# Patient Record
Sex: MALE | Race: WHITE | NOT HISPANIC OR LATINO | Employment: OTHER | URBAN - METROPOLITAN AREA
[De-identification: names, ages, dates, MRNs, and addresses within clinical notes are randomized per-mention and may not be internally consistent; named-entity substitution may affect disease eponyms.]

---

## 2020-09-01 ENCOUNTER — TELEPHONE (OUTPATIENT)
Dept: NEUROLOGY | Facility: HOSPITAL | Age: 80
End: 2020-09-01

## 2020-09-01 NOTE — TELEPHONE ENCOUNTER
----- Message from Dania Avelar sent at 9/1/2020 10:05 AM CDT -----  Contact: [phone]  GI---Pt  Magedio Padua in regards to scheduling an Colonoscopy.  The haven't seen Dr. Borrero since 2016

## 2020-09-02 ENCOUNTER — TELEPHONE (OUTPATIENT)
Dept: NEUROLOGY | Facility: HOSPITAL | Age: 80
End: 2020-09-02

## 2020-09-02 NOTE — TELEPHONE ENCOUNTER
----- Message from Rand Daly sent at 9/2/2020 10:19 AM CDT -----  Contact: [phone]  GI - Patient is calling to schedule a colonoscopy. Please call

## 2021-07-07 ENCOUNTER — HOSPITAL ENCOUNTER (OUTPATIENT)
Dept: RADIOLOGY | Facility: HOSPITAL | Age: 81
Discharge: HOME | End: 2021-07-07
Attending: INTERNAL MEDICINE
Payer: MEDICARE

## 2021-07-07 DIAGNOSIS — M25.531 RIGHT WRIST PAIN: Primary | ICD-10-CM

## 2021-07-07 DIAGNOSIS — M25.531 RIGHT WRIST PAIN: ICD-10-CM

## 2021-07-07 PROCEDURE — 73100 XR WRIST 2 VIEW RIGHT: ICD-10-PCS | Mod: 26,RT,, | Performed by: RADIOLOGY

## 2021-07-07 PROCEDURE — 73100 X-RAY EXAM OF WRIST: CPT | Mod: TC,FY,RT

## 2021-07-07 PROCEDURE — 73100 X-RAY EXAM OF WRIST: CPT | Mod: 26,RT,, | Performed by: RADIOLOGY

## 2021-07-19 ENCOUNTER — TELEPHONE (OUTPATIENT)
Dept: UROLOGY | Facility: CLINIC | Age: 81
End: 2021-07-19

## 2021-07-26 ENCOUNTER — TELEPHONE (OUTPATIENT)
Dept: UROLOGY | Facility: CLINIC | Age: 81
End: 2021-07-26

## 2021-08-03 ENCOUNTER — OFFICE VISIT (OUTPATIENT)
Dept: UROLOGY | Facility: CLINIC | Age: 81
End: 2021-08-03
Payer: MEDICARE

## 2021-08-03 VITALS
WEIGHT: 158.19 LBS | HEART RATE: 74 BPM | HEIGHT: 70 IN | DIASTOLIC BLOOD PRESSURE: 72 MMHG | SYSTOLIC BLOOD PRESSURE: 128 MMHG | BODY MASS INDEX: 22.65 KG/M2

## 2021-08-03 DIAGNOSIS — N40.1 BPH WITH OBSTRUCTION/LOWER URINARY TRACT SYMPTOMS: ICD-10-CM

## 2021-08-03 DIAGNOSIS — N13.8 BPH WITH OBSTRUCTION/LOWER URINARY TRACT SYMPTOMS: ICD-10-CM

## 2021-08-03 DIAGNOSIS — R33.9 URINARY RETENTION: Primary | ICD-10-CM

## 2021-08-03 PROCEDURE — 1101F PT FALLS ASSESS-DOCD LE1/YR: CPT | Mod: CPTII,S$GLB,, | Performed by: NURSE PRACTITIONER

## 2021-08-03 PROCEDURE — 1160F RVW MEDS BY RX/DR IN RCRD: CPT | Mod: CPTII,S$GLB,, | Performed by: NURSE PRACTITIONER

## 2021-08-03 PROCEDURE — 1101F PR PT FALLS ASSESS DOC 0-1 FALLS W/OUT INJ PAST YR: ICD-10-PCS | Mod: CPTII,S$GLB,, | Performed by: NURSE PRACTITIONER

## 2021-08-03 PROCEDURE — 99999 PR PBB SHADOW E&M-EST. PATIENT-LVL III: CPT | Mod: PBBFAC,,, | Performed by: NURSE PRACTITIONER

## 2021-08-03 PROCEDURE — 99999 PR PBB SHADOW E&M-EST. PATIENT-LVL III: ICD-10-PCS | Mod: PBBFAC,,, | Performed by: NURSE PRACTITIONER

## 2021-08-03 PROCEDURE — 1126F AMNT PAIN NOTED NONE PRSNT: CPT | Mod: CPTII,S$GLB,, | Performed by: NURSE PRACTITIONER

## 2021-08-03 PROCEDURE — 99203 OFFICE O/P NEW LOW 30 MIN: CPT | Mod: S$GLB,,, | Performed by: NURSE PRACTITIONER

## 2021-08-03 PROCEDURE — 1126F PR PAIN SEVERITY QUANTIFIED, NO PAIN PRESENT: ICD-10-PCS | Mod: CPTII,S$GLB,, | Performed by: NURSE PRACTITIONER

## 2021-08-03 PROCEDURE — 3074F PR MOST RECENT SYSTOLIC BLOOD PRESSURE < 130 MM HG: ICD-10-PCS | Mod: CPTII,S$GLB,, | Performed by: NURSE PRACTITIONER

## 2021-08-03 PROCEDURE — 99203 PR OFFICE/OUTPT VISIT, NEW, LEVL III, 30-44 MIN: ICD-10-PCS | Mod: S$GLB,,, | Performed by: NURSE PRACTITIONER

## 2021-08-03 PROCEDURE — 1159F PR MEDICATION LIST DOCUMENTED IN MEDICAL RECORD: ICD-10-PCS | Mod: CPTII,S$GLB,, | Performed by: NURSE PRACTITIONER

## 2021-08-03 PROCEDURE — 3078F DIAST BP <80 MM HG: CPT | Mod: CPTII,S$GLB,, | Performed by: NURSE PRACTITIONER

## 2021-08-03 PROCEDURE — 1159F MED LIST DOCD IN RCRD: CPT | Mod: CPTII,S$GLB,, | Performed by: NURSE PRACTITIONER

## 2021-08-03 PROCEDURE — 3288F FALL RISK ASSESSMENT DOCD: CPT | Mod: CPTII,S$GLB,, | Performed by: NURSE PRACTITIONER

## 2021-08-03 PROCEDURE — 3074F SYST BP LT 130 MM HG: CPT | Mod: CPTII,S$GLB,, | Performed by: NURSE PRACTITIONER

## 2021-08-03 PROCEDURE — 3288F PR FALLS RISK ASSESSMENT DOCUMENTED: ICD-10-PCS | Mod: CPTII,S$GLB,, | Performed by: NURSE PRACTITIONER

## 2021-08-03 PROCEDURE — 1160F PR REVIEW ALL MEDS BY PRESCRIBER/CLIN PHARMACIST DOCUMENTED: ICD-10-PCS | Mod: CPTII,S$GLB,, | Performed by: NURSE PRACTITIONER

## 2021-08-03 PROCEDURE — 3078F PR MOST RECENT DIASTOLIC BLOOD PRESSURE < 80 MM HG: ICD-10-PCS | Mod: CPTII,S$GLB,, | Performed by: NURSE PRACTITIONER

## 2021-08-03 RX ORDER — TAMSULOSIN HYDROCHLORIDE 0.4 MG/1
1 CAPSULE ORAL DAILY
COMMUNITY
Start: 2021-07-14

## 2021-08-03 RX ORDER — MUPIROCIN 20 MG/G
OINTMENT TOPICAL
COMMUNITY
Start: 2021-07-29

## 2021-08-03 RX ORDER — BRIMONIDINE TARTRATE, TIMOLOL MALEATE 2; 5 MG/ML; MG/ML
SOLUTION/ DROPS OPHTHALMIC
COMMUNITY
Start: 2021-04-05

## 2021-08-03 RX ORDER — SULFAMETHOXAZOLE AND TRIMETHOPRIM 800; 160 MG/1; MG/1
1 TABLET ORAL 2 TIMES DAILY
COMMUNITY
Start: 2021-07-29 | End: 2021-08-26 | Stop reason: ALTCHOICE

## 2021-08-03 RX ORDER — FINASTERIDE 5 MG/1
1 TABLET, FILM COATED ORAL DAILY
COMMUNITY
Start: 2021-07-14

## 2021-08-26 ENCOUNTER — PROCEDURE VISIT (OUTPATIENT)
Dept: UROLOGY | Facility: CLINIC | Age: 81
End: 2021-08-26
Payer: MEDICARE

## 2021-08-26 DIAGNOSIS — R33.9 URINARY RETENTION: ICD-10-CM

## 2021-08-26 DIAGNOSIS — N40.1 BPH WITH OBSTRUCTION/LOWER URINARY TRACT SYMPTOMS: ICD-10-CM

## 2021-08-26 DIAGNOSIS — N13.8 BPH WITH OBSTRUCTION/LOWER URINARY TRACT SYMPTOMS: ICD-10-CM

## 2021-08-26 PROCEDURE — 52000 CYSTOURETHROSCOPY: CPT | Mod: S$GLB,,, | Performed by: STUDENT IN AN ORGANIZED HEALTH CARE EDUCATION/TRAINING PROGRAM

## 2021-08-26 PROCEDURE — 52000 PR CYSTOURETHROSCOPY: ICD-10-PCS | Mod: S$GLB,,, | Performed by: STUDENT IN AN ORGANIZED HEALTH CARE EDUCATION/TRAINING PROGRAM

## 2021-09-13 ENCOUNTER — TELEPHONE (OUTPATIENT)
Dept: UROLOGY | Facility: CLINIC | Age: 81
End: 2021-09-13

## 2021-09-24 ENCOUNTER — TELEPHONE (OUTPATIENT)
Dept: UROLOGY | Facility: CLINIC | Age: 81
End: 2021-09-24

## 2021-09-24 ENCOUNTER — OFFICE VISIT (OUTPATIENT)
Dept: UROLOGY | Facility: CLINIC | Age: 81
End: 2021-09-24
Payer: MEDICARE

## 2021-09-24 VITALS
BODY MASS INDEX: 22.88 KG/M2 | HEART RATE: 56 BPM | HEIGHT: 70 IN | DIASTOLIC BLOOD PRESSURE: 76 MMHG | SYSTOLIC BLOOD PRESSURE: 177 MMHG | WEIGHT: 159.81 LBS

## 2021-09-24 DIAGNOSIS — N40.1 BPH WITH OBSTRUCTION/LOWER URINARY TRACT SYMPTOMS: ICD-10-CM

## 2021-09-24 DIAGNOSIS — N13.8 BPH WITH OBSTRUCTION/LOWER URINARY TRACT SYMPTOMS: ICD-10-CM

## 2021-09-24 DIAGNOSIS — R33.9 URINARY RETENTION: Primary | ICD-10-CM

## 2021-09-24 PROCEDURE — 1160F PR REVIEW ALL MEDS BY PRESCRIBER/CLIN PHARMACIST DOCUMENTED: ICD-10-PCS | Mod: CPTII,S$GLB,, | Performed by: STUDENT IN AN ORGANIZED HEALTH CARE EDUCATION/TRAINING PROGRAM

## 2021-09-24 PROCEDURE — 99999 PR PBB SHADOW E&M-EST. PATIENT-LVL III: CPT | Mod: PBBFAC,,, | Performed by: STUDENT IN AN ORGANIZED HEALTH CARE EDUCATION/TRAINING PROGRAM

## 2021-09-24 PROCEDURE — 3077F PR MOST RECENT SYSTOLIC BLOOD PRESSURE >= 140 MM HG: ICD-10-PCS | Mod: CPTII,S$GLB,, | Performed by: STUDENT IN AN ORGANIZED HEALTH CARE EDUCATION/TRAINING PROGRAM

## 2021-09-24 PROCEDURE — 99214 PR OFFICE/OUTPT VISIT, EST, LEVL IV, 30-39 MIN: ICD-10-PCS | Mod: 25,S$GLB,, | Performed by: STUDENT IN AN ORGANIZED HEALTH CARE EDUCATION/TRAINING PROGRAM

## 2021-09-24 PROCEDURE — 3077F SYST BP >= 140 MM HG: CPT | Mod: CPTII,S$GLB,, | Performed by: STUDENT IN AN ORGANIZED HEALTH CARE EDUCATION/TRAINING PROGRAM

## 2021-09-24 PROCEDURE — 1160F RVW MEDS BY RX/DR IN RCRD: CPT | Mod: CPTII,S$GLB,, | Performed by: STUDENT IN AN ORGANIZED HEALTH CARE EDUCATION/TRAINING PROGRAM

## 2021-09-24 PROCEDURE — 1159F PR MEDICATION LIST DOCUMENTED IN MEDICAL RECORD: ICD-10-PCS | Mod: CPTII,S$GLB,, | Performed by: STUDENT IN AN ORGANIZED HEALTH CARE EDUCATION/TRAINING PROGRAM

## 2021-09-24 PROCEDURE — 1101F PT FALLS ASSESS-DOCD LE1/YR: CPT | Mod: CPTII,S$GLB,, | Performed by: STUDENT IN AN ORGANIZED HEALTH CARE EDUCATION/TRAINING PROGRAM

## 2021-09-24 PROCEDURE — 3078F PR MOST RECENT DIASTOLIC BLOOD PRESSURE < 80 MM HG: ICD-10-PCS | Mod: CPTII,S$GLB,, | Performed by: STUDENT IN AN ORGANIZED HEALTH CARE EDUCATION/TRAINING PROGRAM

## 2021-09-24 PROCEDURE — 99999 PR PBB SHADOW E&M-EST. PATIENT-LVL III: ICD-10-PCS | Mod: PBBFAC,,, | Performed by: STUDENT IN AN ORGANIZED HEALTH CARE EDUCATION/TRAINING PROGRAM

## 2021-09-24 PROCEDURE — 1159F MED LIST DOCD IN RCRD: CPT | Mod: CPTII,S$GLB,, | Performed by: STUDENT IN AN ORGANIZED HEALTH CARE EDUCATION/TRAINING PROGRAM

## 2021-09-24 PROCEDURE — 51702 PR INSERTION OF TEMPORARY INDWELLING BLADDER CATHETER, SIMPLE: ICD-10-PCS | Mod: S$GLB,,, | Performed by: STUDENT IN AN ORGANIZED HEALTH CARE EDUCATION/TRAINING PROGRAM

## 2021-09-24 PROCEDURE — 99214 OFFICE O/P EST MOD 30 MIN: CPT | Mod: 25,S$GLB,, | Performed by: STUDENT IN AN ORGANIZED HEALTH CARE EDUCATION/TRAINING PROGRAM

## 2021-09-24 PROCEDURE — 3078F DIAST BP <80 MM HG: CPT | Mod: CPTII,S$GLB,, | Performed by: STUDENT IN AN ORGANIZED HEALTH CARE EDUCATION/TRAINING PROGRAM

## 2021-09-24 PROCEDURE — 1126F AMNT PAIN NOTED NONE PRSNT: CPT | Mod: CPTII,S$GLB,, | Performed by: STUDENT IN AN ORGANIZED HEALTH CARE EDUCATION/TRAINING PROGRAM

## 2021-09-24 PROCEDURE — 1101F PR PT FALLS ASSESS DOC 0-1 FALLS W/OUT INJ PAST YR: ICD-10-PCS | Mod: CPTII,S$GLB,, | Performed by: STUDENT IN AN ORGANIZED HEALTH CARE EDUCATION/TRAINING PROGRAM

## 2021-09-24 PROCEDURE — 3288F PR FALLS RISK ASSESSMENT DOCUMENTED: ICD-10-PCS | Mod: CPTII,S$GLB,, | Performed by: STUDENT IN AN ORGANIZED HEALTH CARE EDUCATION/TRAINING PROGRAM

## 2021-09-24 PROCEDURE — 1126F PR PAIN SEVERITY QUANTIFIED, NO PAIN PRESENT: ICD-10-PCS | Mod: CPTII,S$GLB,, | Performed by: STUDENT IN AN ORGANIZED HEALTH CARE EDUCATION/TRAINING PROGRAM

## 2021-09-24 PROCEDURE — 3288F FALL RISK ASSESSMENT DOCD: CPT | Mod: CPTII,S$GLB,, | Performed by: STUDENT IN AN ORGANIZED HEALTH CARE EDUCATION/TRAINING PROGRAM

## 2021-09-24 PROCEDURE — 51702 INSERT TEMP BLADDER CATH: CPT | Mod: S$GLB,,, | Performed by: STUDENT IN AN ORGANIZED HEALTH CARE EDUCATION/TRAINING PROGRAM

## 2021-09-30 ENCOUNTER — TELEPHONE (OUTPATIENT)
Dept: UROLOGY | Facility: CLINIC | Age: 81
End: 2021-09-30

## 2021-09-30 ENCOUNTER — OFFICE VISIT (OUTPATIENT)
Dept: UROLOGY | Facility: CLINIC | Age: 81
End: 2021-09-30
Payer: MEDICARE

## 2021-09-30 VITALS — HEART RATE: 88 BPM | DIASTOLIC BLOOD PRESSURE: 77 MMHG | SYSTOLIC BLOOD PRESSURE: 149 MMHG

## 2021-09-30 DIAGNOSIS — T83.011A MALFUNCTION OF FOLEY CATHETER, INITIAL ENCOUNTER: ICD-10-CM

## 2021-09-30 DIAGNOSIS — R30.0 DYSURIA: Primary | ICD-10-CM

## 2021-09-30 PROCEDURE — 1160F PR REVIEW ALL MEDS BY PRESCRIBER/CLIN PHARMACIST DOCUMENTED: ICD-10-PCS | Mod: CPTII,S$GLB,, | Performed by: STUDENT IN AN ORGANIZED HEALTH CARE EDUCATION/TRAINING PROGRAM

## 2021-09-30 PROCEDURE — 3078F PR MOST RECENT DIASTOLIC BLOOD PRESSURE < 80 MM HG: ICD-10-PCS | Mod: CPTII,S$GLB,, | Performed by: STUDENT IN AN ORGANIZED HEALTH CARE EDUCATION/TRAINING PROGRAM

## 2021-09-30 PROCEDURE — 99214 PR OFFICE/OUTPT VISIT, EST, LEVL IV, 30-39 MIN: ICD-10-PCS | Mod: S$GLB,,, | Performed by: STUDENT IN AN ORGANIZED HEALTH CARE EDUCATION/TRAINING PROGRAM

## 2021-09-30 PROCEDURE — 3077F SYST BP >= 140 MM HG: CPT | Mod: CPTII,S$GLB,, | Performed by: STUDENT IN AN ORGANIZED HEALTH CARE EDUCATION/TRAINING PROGRAM

## 2021-09-30 PROCEDURE — 99999 PR PBB SHADOW E&M-EST. PATIENT-LVL III: CPT | Mod: PBBFAC,,, | Performed by: STUDENT IN AN ORGANIZED HEALTH CARE EDUCATION/TRAINING PROGRAM

## 2021-09-30 PROCEDURE — 1125F PR PAIN SEVERITY QUANTIFIED, PAIN PRESENT: ICD-10-PCS | Mod: CPTII,S$GLB,, | Performed by: STUDENT IN AN ORGANIZED HEALTH CARE EDUCATION/TRAINING PROGRAM

## 2021-09-30 PROCEDURE — 1125F AMNT PAIN NOTED PAIN PRSNT: CPT | Mod: CPTII,S$GLB,, | Performed by: STUDENT IN AN ORGANIZED HEALTH CARE EDUCATION/TRAINING PROGRAM

## 2021-09-30 PROCEDURE — 1159F PR MEDICATION LIST DOCUMENTED IN MEDICAL RECORD: ICD-10-PCS | Mod: CPTII,S$GLB,, | Performed by: STUDENT IN AN ORGANIZED HEALTH CARE EDUCATION/TRAINING PROGRAM

## 2021-09-30 PROCEDURE — 1159F MED LIST DOCD IN RCRD: CPT | Mod: CPTII,S$GLB,, | Performed by: STUDENT IN AN ORGANIZED HEALTH CARE EDUCATION/TRAINING PROGRAM

## 2021-09-30 PROCEDURE — 99214 OFFICE O/P EST MOD 30 MIN: CPT | Mod: S$GLB,,, | Performed by: STUDENT IN AN ORGANIZED HEALTH CARE EDUCATION/TRAINING PROGRAM

## 2021-09-30 PROCEDURE — 3078F DIAST BP <80 MM HG: CPT | Mod: CPTII,S$GLB,, | Performed by: STUDENT IN AN ORGANIZED HEALTH CARE EDUCATION/TRAINING PROGRAM

## 2021-09-30 PROCEDURE — 99999 PR PBB SHADOW E&M-EST. PATIENT-LVL III: ICD-10-PCS | Mod: PBBFAC,,, | Performed by: STUDENT IN AN ORGANIZED HEALTH CARE EDUCATION/TRAINING PROGRAM

## 2021-09-30 PROCEDURE — 1160F RVW MEDS BY RX/DR IN RCRD: CPT | Mod: CPTII,S$GLB,, | Performed by: STUDENT IN AN ORGANIZED HEALTH CARE EDUCATION/TRAINING PROGRAM

## 2021-09-30 PROCEDURE — 3077F PR MOST RECENT SYSTOLIC BLOOD PRESSURE >= 140 MM HG: ICD-10-PCS | Mod: CPTII,S$GLB,, | Performed by: STUDENT IN AN ORGANIZED HEALTH CARE EDUCATION/TRAINING PROGRAM

## 2021-09-30 RX ORDER — SULFAMETHOXAZOLE AND TRIMETHOPRIM 800; 160 MG/1; MG/1
1 TABLET ORAL 2 TIMES DAILY
Qty: 20 TABLET | Refills: 0 | Status: SHIPPED | OUTPATIENT
Start: 2021-09-30 | End: 2021-10-10

## 2021-10-15 ENCOUNTER — TELEPHONE (OUTPATIENT)
Dept: UROLOGY | Facility: CLINIC | Age: 81
End: 2021-10-15

## 2021-10-18 ENCOUNTER — TELEPHONE (OUTPATIENT)
Dept: UROLOGY | Facility: CLINIC | Age: 81
End: 2021-10-18

## 2021-10-19 ENCOUNTER — TELEPHONE (OUTPATIENT)
Dept: UROLOGY | Facility: CLINIC | Age: 81
End: 2021-10-19

## 2021-10-19 DIAGNOSIS — R33.9 URINARY RETENTION: Primary | ICD-10-CM

## 2021-10-20 ENCOUNTER — TELEPHONE (OUTPATIENT)
Dept: UROLOGY | Facility: CLINIC | Age: 81
End: 2021-10-20

## 2021-10-29 ENCOUNTER — TELEPHONE (OUTPATIENT)
Dept: UROLOGY | Facility: CLINIC | Age: 81
End: 2021-10-29
Payer: MEDICARE

## 2021-11-02 ENCOUNTER — TELEPHONE (OUTPATIENT)
Dept: UROLOGY | Facility: CLINIC | Age: 81
End: 2021-11-02
Payer: MEDICARE

## 2021-11-02 ENCOUNTER — CLINICAL SUPPORT (OUTPATIENT)
Dept: UROLOGY | Facility: CLINIC | Age: 81
End: 2021-11-02
Payer: MEDICARE

## 2021-11-02 DIAGNOSIS — R33.9 URINARY RETENTION: Primary | ICD-10-CM

## 2021-11-17 ENCOUNTER — TELEPHONE (OUTPATIENT)
Dept: UROLOGY | Facility: CLINIC | Age: 81
End: 2021-11-17
Payer: MEDICARE

## 2021-11-18 ENCOUNTER — HOSPITAL ENCOUNTER (OUTPATIENT)
Facility: HOSPITAL | Age: 81
Discharge: HOME | End: 2021-11-18
Attending: UROLOGY | Admitting: UROLOGY
Payer: MEDICARE

## 2021-11-18 VITALS
OXYGEN SATURATION: 98 % | HEIGHT: 70 IN | SYSTOLIC BLOOD PRESSURE: 132 MMHG | TEMPERATURE: 98 F | HEART RATE: 92 BPM | DIASTOLIC BLOOD PRESSURE: 76 MMHG | BODY MASS INDEX: 22.9 KG/M2 | RESPIRATION RATE: 16 BRPM | WEIGHT: 160 LBS

## 2021-11-18 DIAGNOSIS — R33.9 URINARY RETENTION: ICD-10-CM

## 2021-11-18 DIAGNOSIS — N32.9 BLADDER DISORDER: ICD-10-CM

## 2021-11-18 PROCEDURE — 51600 PR INJECTION FOR BLADDER X-RAY: ICD-10-PCS | Mod: ,,, | Performed by: UROLOGY

## 2021-11-18 PROCEDURE — 51600 INJECTION FOR BLADDER X-RAY: CPT | Mod: ,,, | Performed by: UROLOGY

## 2021-11-18 PROCEDURE — 51784 ANAL/URINARY MUSCLE STUDY: CPT | Mod: 26,51,, | Performed by: UROLOGY

## 2021-11-18 PROCEDURE — 27200973 HC CYSTO SUPPLY IV (URODYNAMICS): Performed by: UROLOGY

## 2021-11-18 PROCEDURE — 51797 INTRAABDOMINAL PRESSURE TEST: CPT | Mod: 26,,, | Performed by: UROLOGY

## 2021-11-18 PROCEDURE — 51784 PR ANAL/URINARY MUSCLE STUDY: ICD-10-PCS | Mod: 26,51,, | Performed by: UROLOGY

## 2021-11-18 PROCEDURE — 74455 X-RAY URETHRA/BLADDER: CPT | Mod: 26,,, | Performed by: UROLOGY

## 2021-11-18 PROCEDURE — 51741 PR UROFLOWMETRY, COMPLEX: ICD-10-PCS | Mod: 26,51,, | Performed by: UROLOGY

## 2021-11-18 PROCEDURE — 51797 PR VOIDING PRESS STUDY INTRA-ABDOMINAL VOID: ICD-10-PCS | Mod: 26,,, | Performed by: UROLOGY

## 2021-11-18 PROCEDURE — 36000707: Performed by: UROLOGY

## 2021-11-18 PROCEDURE — 51741 ELECTRO-UROFLOWMETRY FIRST: CPT | Mod: 26,51,, | Performed by: UROLOGY

## 2021-11-18 PROCEDURE — 36000706: Performed by: UROLOGY

## 2021-11-18 PROCEDURE — 51728 PR COMPLEX CYSTOMETROGRAM VOIDING PRESSURE STUDIES: ICD-10-PCS | Mod: 26,,, | Performed by: UROLOGY

## 2021-11-18 PROCEDURE — 74455 PR X-RAY URETHROCYSTOGRAM+VOIDING: ICD-10-PCS | Mod: 26,,, | Performed by: UROLOGY

## 2021-11-18 PROCEDURE — 51728 CYSTOMETROGRAM W/VP: CPT | Mod: 26,,, | Performed by: UROLOGY

## 2021-11-29 ENCOUNTER — TELEPHONE (OUTPATIENT)
Dept: UROLOGY | Facility: CLINIC | Age: 81
End: 2021-11-29
Payer: MEDICARE

## 2021-11-30 ENCOUNTER — OFFICE VISIT (OUTPATIENT)
Dept: UROLOGY | Facility: CLINIC | Age: 81
End: 2021-11-30
Payer: MEDICARE

## 2021-11-30 ENCOUNTER — HOSPITAL ENCOUNTER (OUTPATIENT)
Dept: RADIOLOGY | Facility: HOSPITAL | Age: 81
Discharge: HOME | End: 2021-11-30
Attending: STUDENT IN AN ORGANIZED HEALTH CARE EDUCATION/TRAINING PROGRAM
Payer: MEDICARE

## 2021-11-30 ENCOUNTER — CLINICAL SUPPORT (OUTPATIENT)
Dept: LAB | Facility: HOSPITAL | Age: 81
End: 2021-11-30
Attending: STUDENT IN AN ORGANIZED HEALTH CARE EDUCATION/TRAINING PROGRAM
Payer: MEDICARE

## 2021-11-30 VITALS
HEIGHT: 70 IN | BODY MASS INDEX: 23.06 KG/M2 | WEIGHT: 161.06 LBS | HEART RATE: 76 BPM | SYSTOLIC BLOOD PRESSURE: 150 MMHG | DIASTOLIC BLOOD PRESSURE: 69 MMHG

## 2021-11-30 DIAGNOSIS — N13.8 BPH WITH OBSTRUCTION/LOWER URINARY TRACT SYMPTOMS: ICD-10-CM

## 2021-11-30 DIAGNOSIS — R33.9 URINARY RETENTION: Primary | ICD-10-CM

## 2021-11-30 DIAGNOSIS — N40.1 BPH WITH OBSTRUCTION/LOWER URINARY TRACT SYMPTOMS: ICD-10-CM

## 2021-11-30 DIAGNOSIS — R33.9 URINARY RETENTION: ICD-10-CM

## 2021-11-30 DIAGNOSIS — N32.3 BLADDER DIVERTICULUM: ICD-10-CM

## 2021-11-30 PROCEDURE — 99999 PR PBB SHADOW E&M-EST. PATIENT-LVL V: ICD-10-PCS | Mod: PBBFAC,,, | Performed by: STUDENT IN AN ORGANIZED HEALTH CARE EDUCATION/TRAINING PROGRAM

## 2021-11-30 PROCEDURE — 99999 PR PBB SHADOW E&M-EST. PATIENT-LVL V: CPT | Mod: PBBFAC,,, | Performed by: STUDENT IN AN ORGANIZED HEALTH CARE EDUCATION/TRAINING PROGRAM

## 2021-11-30 PROCEDURE — 99214 OFFICE O/P EST MOD 30 MIN: CPT | Mod: S$GLB,,, | Performed by: STUDENT IN AN ORGANIZED HEALTH CARE EDUCATION/TRAINING PROGRAM

## 2021-11-30 PROCEDURE — 99214 PR OFFICE/OUTPT VISIT, EST, LEVL IV, 30-39 MIN: ICD-10-PCS | Mod: S$GLB,,, | Performed by: STUDENT IN AN ORGANIZED HEALTH CARE EDUCATION/TRAINING PROGRAM

## 2021-11-30 PROCEDURE — 71046 X-RAY EXAM CHEST 2 VIEWS: CPT | Mod: TC,FY

## 2021-11-30 PROCEDURE — 71046 X-RAY EXAM CHEST 2 VIEWS: CPT | Mod: 26,,, | Performed by: RADIOLOGY

## 2021-11-30 PROCEDURE — 87086 URINE CULTURE/COLONY COUNT: CPT | Performed by: STUDENT IN AN ORGANIZED HEALTH CARE EDUCATION/TRAINING PROGRAM

## 2021-11-30 PROCEDURE — 71046 XR CHEST PA AND LATERAL PRE-OP: ICD-10-PCS | Mod: 26,,, | Performed by: RADIOLOGY

## 2021-11-30 PROCEDURE — 93005 ELECTROCARDIOGRAM TRACING: CPT

## 2021-11-30 PROCEDURE — 93010 ELECTROCARDIOGRAM REPORT: CPT | Mod: ,,, | Performed by: INTERNAL MEDICINE

## 2021-11-30 PROCEDURE — 93010 EKG 12-LEAD: ICD-10-PCS | Mod: ,,, | Performed by: INTERNAL MEDICINE

## 2021-11-30 RX ORDER — SODIUM CHLORIDE 9 MG/ML
INJECTION, SOLUTION INTRAVENOUS CONTINUOUS
Status: CANCELLED | OUTPATIENT
Start: 2021-11-30

## 2021-12-01 ENCOUNTER — TELEPHONE (OUTPATIENT)
Dept: UROLOGY | Facility: CLINIC | Age: 81
End: 2021-12-01
Payer: MEDICARE

## 2021-12-01 LAB
BACTERIA UR CULT: NORMAL
BACTERIA UR CULT: NORMAL

## 2021-12-06 ENCOUNTER — OFFICE VISIT (OUTPATIENT)
Dept: CARDIOLOGY | Facility: CLINIC | Age: 81
End: 2021-12-06
Payer: MEDICARE

## 2021-12-06 VITALS
BODY MASS INDEX: 23.45 KG/M2 | DIASTOLIC BLOOD PRESSURE: 69 MMHG | SYSTOLIC BLOOD PRESSURE: 143 MMHG | WEIGHT: 163.81 LBS | HEIGHT: 70 IN | HEART RATE: 79 BPM

## 2021-12-06 DIAGNOSIS — R33.9 URINARY RETENTION: ICD-10-CM

## 2021-12-06 DIAGNOSIS — Z01.818 PREOPERATIVE CLEARANCE: Primary | ICD-10-CM

## 2021-12-06 DIAGNOSIS — I35.8 AORTIC VALVE SCLEROSIS: ICD-10-CM

## 2021-12-06 PROCEDURE — 99999 PR PBB SHADOW E&M-EST. PATIENT-LVL IV: ICD-10-PCS | Mod: PBBFAC,,, | Performed by: INTERNAL MEDICINE

## 2021-12-06 PROCEDURE — 99204 PR OFFICE/OUTPT VISIT, NEW, LEVL IV, 45-59 MIN: ICD-10-PCS | Mod: S$GLB,,, | Performed by: INTERNAL MEDICINE

## 2021-12-06 PROCEDURE — 99204 OFFICE O/P NEW MOD 45 MIN: CPT | Mod: S$GLB,,, | Performed by: INTERNAL MEDICINE

## 2021-12-06 PROCEDURE — 99999 PR PBB SHADOW E&M-EST. PATIENT-LVL IV: CPT | Mod: PBBFAC,,, | Performed by: INTERNAL MEDICINE

## 2021-12-07 ENCOUNTER — TELEPHONE (OUTPATIENT)
Dept: UROLOGY | Facility: CLINIC | Age: 81
End: 2021-12-07
Payer: MEDICARE

## 2021-12-08 ENCOUNTER — HOSPITAL ENCOUNTER (OUTPATIENT)
Facility: HOSPITAL | Age: 81
Discharge: HOME | End: 2021-12-09
Attending: STUDENT IN AN ORGANIZED HEALTH CARE EDUCATION/TRAINING PROGRAM | Admitting: STUDENT IN AN ORGANIZED HEALTH CARE EDUCATION/TRAINING PROGRAM
Payer: MEDICARE

## 2021-12-08 ENCOUNTER — ANESTHESIA EVENT (OUTPATIENT)
Dept: SURGERY | Facility: HOSPITAL | Age: 81
End: 2021-12-08
Payer: MEDICARE

## 2021-12-08 ENCOUNTER — ANESTHESIA (OUTPATIENT)
Dept: SURGERY | Facility: HOSPITAL | Age: 81
End: 2021-12-08
Payer: MEDICARE

## 2021-12-08 DIAGNOSIS — N40.1 BPH WITH OBSTRUCTION/LOWER URINARY TRACT SYMPTOMS: ICD-10-CM

## 2021-12-08 DIAGNOSIS — N13.8 BPH WITH OBSTRUCTION/LOWER URINARY TRACT SYMPTOMS: ICD-10-CM

## 2021-12-08 DIAGNOSIS — N32.3 BLADDER DIVERTICULUM: ICD-10-CM

## 2021-12-08 DIAGNOSIS — R33.9 URINARY RETENTION: Primary | ICD-10-CM

## 2021-12-08 PROCEDURE — 88305 TISSUE EXAM BY PATHOLOGIST: CPT | Performed by: STUDENT IN AN ORGANIZED HEALTH CARE EDUCATION/TRAINING PROGRAM

## 2021-12-08 PROCEDURE — C2617 STENT, NON-COR, TEM W/O DEL: HCPCS | Performed by: STUDENT IN AN ORGANIZED HEALTH CARE EDUCATION/TRAINING PROGRAM

## 2021-12-08 PROCEDURE — 52601 PROSTATECTOMY (TURP): CPT | Mod: ,,, | Performed by: STUDENT IN AN ORGANIZED HEALTH CARE EDUCATION/TRAINING PROGRAM

## 2021-12-08 PROCEDURE — 52317 PR REMOVE BLADDER STONE,<2.5 CM: ICD-10-PCS | Mod: 51,,, | Performed by: STUDENT IN AN ORGANIZED HEALTH CARE EDUCATION/TRAINING PROGRAM

## 2021-12-08 PROCEDURE — 37000008 HC ANESTHESIA 1ST 15 MINUTES: Performed by: STUDENT IN AN ORGANIZED HEALTH CARE EDUCATION/TRAINING PROGRAM

## 2021-12-08 PROCEDURE — 63600175 PHARM REV CODE 636 W HCPCS: Performed by: NURSE ANESTHETIST, CERTIFIED REGISTERED

## 2021-12-08 PROCEDURE — 63600175 PHARM REV CODE 636 W HCPCS: Performed by: STUDENT IN AN ORGANIZED HEALTH CARE EDUCATION/TRAINING PROGRAM

## 2021-12-08 PROCEDURE — 88341 IMHCHEM/IMCYTCHM EA ADD ANTB: CPT | Mod: 26,,, | Performed by: STUDENT IN AN ORGANIZED HEALTH CARE EDUCATION/TRAINING PROGRAM

## 2021-12-08 PROCEDURE — 88342 IMHCHEM/IMCYTCHM 1ST ANTB: CPT | Mod: 26,,, | Performed by: STUDENT IN AN ORGANIZED HEALTH CARE EDUCATION/TRAINING PROGRAM

## 2021-12-08 PROCEDURE — G0378 HOSPITAL OBSERVATION PER HR: HCPCS

## 2021-12-08 PROCEDURE — 88342 IMHCHEM/IMCYTCHM 1ST ANTB: CPT | Performed by: STUDENT IN AN ORGANIZED HEALTH CARE EDUCATION/TRAINING PROGRAM

## 2021-12-08 PROCEDURE — 88341 IMHCHEM/IMCYTCHM EA ADD ANTB: CPT | Performed by: STUDENT IN AN ORGANIZED HEALTH CARE EDUCATION/TRAINING PROGRAM

## 2021-12-08 PROCEDURE — 71000039 HC RECOVERY, EACH ADD'L HOUR: Performed by: STUDENT IN AN ORGANIZED HEALTH CARE EDUCATION/TRAINING PROGRAM

## 2021-12-08 PROCEDURE — 71000033 HC RECOVERY, INTIAL HOUR: Performed by: STUDENT IN AN ORGANIZED HEALTH CARE EDUCATION/TRAINING PROGRAM

## 2021-12-08 PROCEDURE — 94799 UNLISTED PULMONARY SVC/PX: CPT

## 2021-12-08 PROCEDURE — 88305 TISSUE EXAM BY PATHOLOGIST: CPT | Mod: 26,,, | Performed by: STUDENT IN AN ORGANIZED HEALTH CARE EDUCATION/TRAINING PROGRAM

## 2021-12-08 PROCEDURE — 25000003 PHARM REV CODE 250: Performed by: STUDENT IN AN ORGANIZED HEALTH CARE EDUCATION/TRAINING PROGRAM

## 2021-12-08 PROCEDURE — 36000706: Performed by: STUDENT IN AN ORGANIZED HEALTH CARE EDUCATION/TRAINING PROGRAM

## 2021-12-08 PROCEDURE — 25000003 PHARM REV CODE 250: Performed by: NURSE ANESTHETIST, CERTIFIED REGISTERED

## 2021-12-08 PROCEDURE — 27201423 OPTIME MED/SURG SUP & DEVICES STERILE SUPPLY: Performed by: STUDENT IN AN ORGANIZED HEALTH CARE EDUCATION/TRAINING PROGRAM

## 2021-12-08 PROCEDURE — 88342 CHG IMMUNOCYTOCHEMISTRY: ICD-10-PCS | Mod: 26,,, | Performed by: STUDENT IN AN ORGANIZED HEALTH CARE EDUCATION/TRAINING PROGRAM

## 2021-12-08 PROCEDURE — 36000707: Performed by: STUDENT IN AN ORGANIZED HEALTH CARE EDUCATION/TRAINING PROGRAM

## 2021-12-08 PROCEDURE — 37000009 HC ANESTHESIA EA ADD 15 MINS: Performed by: STUDENT IN AN ORGANIZED HEALTH CARE EDUCATION/TRAINING PROGRAM

## 2021-12-08 PROCEDURE — 52317 REMOVE BLADDER STONE: CPT | Mod: 51,,, | Performed by: STUDENT IN AN ORGANIZED HEALTH CARE EDUCATION/TRAINING PROGRAM

## 2021-12-08 PROCEDURE — 88341 PR IHC OR ICC EACH ADD'L SINGLE ANTIBODY  STAINPR: ICD-10-PCS | Mod: 26,,, | Performed by: STUDENT IN AN ORGANIZED HEALTH CARE EDUCATION/TRAINING PROGRAM

## 2021-12-08 PROCEDURE — 52601 PR TRANSURETHRAL ELEC-SURG PROSTATECTOM: ICD-10-PCS | Mod: ,,, | Performed by: STUDENT IN AN ORGANIZED HEALTH CARE EDUCATION/TRAINING PROGRAM

## 2021-12-08 PROCEDURE — 88305 TISSUE EXAM BY PATHOLOGIST: ICD-10-PCS | Mod: 26,,, | Performed by: STUDENT IN AN ORGANIZED HEALTH CARE EDUCATION/TRAINING PROGRAM

## 2021-12-08 RX ORDER — LIDOCAINE HYDROCHLORIDE 20 MG/ML
INJECTION INTRAVENOUS
Status: DISCONTINUED | OUTPATIENT
Start: 2021-12-08 | End: 2021-12-08

## 2021-12-08 RX ORDER — DIPHENHYDRAMINE HCL 25 MG
25 CAPSULE ORAL EVERY 12 HOURS PRN
Status: DISCONTINUED | OUTPATIENT
Start: 2021-12-08 | End: 2021-12-09 | Stop reason: HOSPADM

## 2021-12-08 RX ORDER — TAMSULOSIN HYDROCHLORIDE 0.4 MG/1
1 CAPSULE ORAL DAILY
Status: DISCONTINUED | OUTPATIENT
Start: 2021-12-09 | End: 2021-12-08

## 2021-12-08 RX ORDER — PHENYLEPHRINE HYDROCHLORIDE 10 MG/ML
INJECTION INTRAVENOUS
Status: DISCONTINUED | OUTPATIENT
Start: 2021-12-08 | End: 2021-12-08

## 2021-12-08 RX ORDER — HYDRALAZINE HYDROCHLORIDE 20 MG/ML
10 INJECTION INTRAMUSCULAR; INTRAVENOUS EVERY 4 HOURS PRN
Status: DISCONTINUED | OUTPATIENT
Start: 2021-12-08 | End: 2021-12-09 | Stop reason: HOSPADM

## 2021-12-08 RX ORDER — SODIUM CHLORIDE 9 MG/ML
INJECTION, SOLUTION INTRAVENOUS CONTINUOUS
Status: DISCONTINUED | OUTPATIENT
Start: 2021-12-08 | End: 2021-12-09 | Stop reason: HOSPADM

## 2021-12-08 RX ORDER — MORPHINE SULFATE 2 MG/ML
1 INJECTION, SOLUTION INTRAMUSCULAR; INTRAVENOUS EVERY 4 HOURS PRN
Status: DISCONTINUED | OUTPATIENT
Start: 2021-12-08 | End: 2021-12-09 | Stop reason: HOSPADM

## 2021-12-08 RX ORDER — FINASTERIDE 5 MG/1
5 TABLET, FILM COATED ORAL DAILY
Status: DISCONTINUED | OUTPATIENT
Start: 2021-12-08 | End: 2021-12-09 | Stop reason: HOSPADM

## 2021-12-08 RX ORDER — ONDANSETRON 2 MG/ML
8 INJECTION INTRAMUSCULAR; INTRAVENOUS EVERY 6 HOURS PRN
Status: DISCONTINUED | OUTPATIENT
Start: 2021-12-08 | End: 2021-12-09 | Stop reason: HOSPADM

## 2021-12-08 RX ORDER — FENTANYL CITRATE 50 UG/ML
INJECTION, SOLUTION INTRAMUSCULAR; INTRAVENOUS
Status: DISCONTINUED | OUTPATIENT
Start: 2021-12-08 | End: 2021-12-08

## 2021-12-08 RX ORDER — OXYCODONE AND ACETAMINOPHEN 10; 325 MG/1; MG/1
1 TABLET ORAL EVERY 4 HOURS PRN
Status: DISCONTINUED | OUTPATIENT
Start: 2021-12-08 | End: 2021-12-09 | Stop reason: HOSPADM

## 2021-12-08 RX ORDER — ONDANSETRON 2 MG/ML
INJECTION INTRAMUSCULAR; INTRAVENOUS
Status: DISCONTINUED | OUTPATIENT
Start: 2021-12-08 | End: 2021-12-08

## 2021-12-08 RX ORDER — SODIUM CHLORIDE 9 MG/ML
INJECTION, SOLUTION INTRAVENOUS CONTINUOUS
Status: DISCONTINUED | OUTPATIENT
Start: 2021-12-08 | End: 2021-12-08

## 2021-12-08 RX ORDER — VASOPRESSIN 20 [USP'U]/ML
INJECTION, SOLUTION INTRAMUSCULAR; SUBCUTANEOUS
Status: DISCONTINUED | OUTPATIENT
Start: 2021-12-08 | End: 2021-12-08

## 2021-12-08 RX ORDER — SUCCINYLCHOLINE CHLORIDE 20 MG/ML
INJECTION INTRAMUSCULAR; INTRAVENOUS
Status: DISCONTINUED | OUTPATIENT
Start: 2021-12-08 | End: 2021-12-08

## 2021-12-08 RX ORDER — SODIUM CHLORIDE 0.9 % (FLUSH) 0.9 %
10 SYRINGE (ML) INJECTION
Status: DISCONTINUED | OUTPATIENT
Start: 2021-12-08 | End: 2021-12-09 | Stop reason: HOSPADM

## 2021-12-08 RX ORDER — PROPOFOL 10 MG/ML
VIAL (ML) INTRAVENOUS
Status: DISCONTINUED | OUTPATIENT
Start: 2021-12-08 | End: 2021-12-08

## 2021-12-08 RX ORDER — CEFAZOLIN SODIUM 2 G/50ML
2 SOLUTION INTRAVENOUS
Status: COMPLETED | OUTPATIENT
Start: 2021-12-08 | End: 2021-12-08

## 2021-12-08 RX ORDER — DEXAMETHASONE SODIUM PHOSPHATE 4 MG/ML
INJECTION, SOLUTION INTRA-ARTICULAR; INTRALESIONAL; INTRAMUSCULAR; INTRAVENOUS; SOFT TISSUE
Status: DISCONTINUED | OUTPATIENT
Start: 2021-12-08 | End: 2021-12-08

## 2021-12-08 RX ORDER — FINASTERIDE 5 MG/1
5 TABLET, FILM COATED ORAL DAILY
Status: DISCONTINUED | OUTPATIENT
Start: 2021-12-09 | End: 2021-12-08

## 2021-12-08 RX ORDER — OXYCODONE AND ACETAMINOPHEN 5; 325 MG/1; MG/1
1 TABLET ORAL EVERY 4 HOURS PRN
Status: DISCONTINUED | OUTPATIENT
Start: 2021-12-08 | End: 2021-12-09 | Stop reason: HOSPADM

## 2021-12-08 RX ORDER — ACETAMINOPHEN 10 MG/ML
INJECTION, SOLUTION INTRAVENOUS
Status: DISCONTINUED | OUTPATIENT
Start: 2021-12-08 | End: 2021-12-08

## 2021-12-08 RX ORDER — TAMSULOSIN HYDROCHLORIDE 0.4 MG/1
1 CAPSULE ORAL DAILY
Status: DISCONTINUED | OUTPATIENT
Start: 2021-12-08 | End: 2021-12-09 | Stop reason: HOSPADM

## 2021-12-08 RX ADMIN — OXYCODONE AND ACETAMINOPHEN 1 TABLET: 10; 325 TABLET ORAL at 08:12

## 2021-12-08 RX ADMIN — VASOPRESSIN 2 UNITS: 20 INJECTION, SOLUTION INTRAMUSCULAR; SUBCUTANEOUS at 11:12

## 2021-12-08 RX ADMIN — LIDOCAINE HYDROCHLORIDE 80 MG: 20 INJECTION, SOLUTION INTRAVENOUS at 10:12

## 2021-12-08 RX ADMIN — CEFAZOLIN SODIUM 2 G: 2 SOLUTION INTRAVENOUS at 11:12

## 2021-12-08 RX ADMIN — FINASTERIDE 5 MG: 5 TABLET, FILM COATED ORAL at 06:12

## 2021-12-08 RX ADMIN — GLYCOPYRROLATE 0.2 MG: 0.2 INJECTION, SOLUTION INTRAMUSCULAR; INTRAVITREAL at 11:12

## 2021-12-08 RX ADMIN — DEXAMETHASONE SODIUM PHOSPHATE 8 MG: 4 INJECTION, SOLUTION INTRA-ARTICULAR; INTRALESIONAL; INTRAMUSCULAR; INTRAVENOUS; SOFT TISSUE at 11:12

## 2021-12-08 RX ADMIN — VASOPRESSIN 1 UNITS: 20 INJECTION, SOLUTION INTRAMUSCULAR; SUBCUTANEOUS at 11:12

## 2021-12-08 RX ADMIN — ACETAMINOPHEN 1000 MG: 10 INJECTION, SOLUTION INTRAVENOUS at 11:12

## 2021-12-08 RX ADMIN — PROPOFOL 150 MG: 10 INJECTION, EMULSION INTRAVENOUS at 10:12

## 2021-12-08 RX ADMIN — TAMSULOSIN HYDROCHLORIDE 0.4 MG: 0.4 CAPSULE ORAL at 06:12

## 2021-12-08 RX ADMIN — SODIUM CHLORIDE: 0.9 INJECTION, SOLUTION INTRAVENOUS at 04:12

## 2021-12-08 RX ADMIN — SODIUM CHLORIDE: 0.9 INJECTION, SOLUTION INTRAVENOUS at 09:12

## 2021-12-08 RX ADMIN — PHENYLEPHRINE HYDROCHLORIDE 100 MCG: 10 INJECTION INTRAVENOUS at 11:12

## 2021-12-08 RX ADMIN — SUCCINYLCHOLINE CHLORIDE 120 MG: 20 INJECTION, SOLUTION INTRAMUSCULAR; INTRAVENOUS at 10:12

## 2021-12-08 RX ADMIN — ONDANSETRON 8 MG: 2 INJECTION, SOLUTION INTRAMUSCULAR; INTRAVENOUS at 12:12

## 2021-12-08 RX ADMIN — FENTANYL CITRATE 50 MCG: 50 INJECTION, SOLUTION INTRAMUSCULAR; INTRAVENOUS at 11:12

## 2021-12-08 RX ADMIN — FENTANYL CITRATE 25 MCG: 50 INJECTION, SOLUTION INTRAMUSCULAR; INTRAVENOUS at 10:12

## 2021-12-09 VITALS
HEIGHT: 70 IN | BODY MASS INDEX: 23.35 KG/M2 | SYSTOLIC BLOOD PRESSURE: 148 MMHG | TEMPERATURE: 99 F | OXYGEN SATURATION: 97 % | WEIGHT: 163.13 LBS | DIASTOLIC BLOOD PRESSURE: 65 MMHG | HEART RATE: 63 BPM | RESPIRATION RATE: 18 BRPM

## 2021-12-09 LAB
BASOPHILS # BLD AUTO: 0.01 K/UL (ref 0–0.2)
BASOPHILS NFR BLD: 0.1 % (ref 0–1.9)
DIFFERENTIAL METHOD: ABNORMAL
EOSINOPHIL # BLD AUTO: 0 K/UL (ref 0–0.5)
EOSINOPHIL NFR BLD: 0 % (ref 0–8)
ERYTHROCYTE [DISTWIDTH] IN BLOOD BY AUTOMATED COUNT: 12.4 % (ref 11.5–14.5)
HCT VFR BLD AUTO: 36.9 % (ref 40–54)
HGB BLD-MCNC: 12.1 G/DL (ref 14–18)
IMM GRANULOCYTES # BLD AUTO: 0.05 K/UL (ref 0–0.04)
IMM GRANULOCYTES NFR BLD AUTO: 0.5 % (ref 0–0.5)
LYMPHOCYTES # BLD AUTO: 1.1 K/UL (ref 1–4.8)
LYMPHOCYTES NFR BLD: 11.8 % (ref 18–48)
MCH RBC QN AUTO: 30.8 PG (ref 27–31)
MCHC RBC AUTO-ENTMCNC: 32.8 G/DL (ref 32–36)
MCV RBC AUTO: 94 FL (ref 82–98)
MONOCYTES # BLD AUTO: 0.7 K/UL (ref 0.3–1)
MONOCYTES NFR BLD: 7 % (ref 4–15)
NEUTROPHILS # BLD AUTO: 7.7 K/UL (ref 1.8–7.7)
NEUTROPHILS NFR BLD: 80.6 % (ref 38–73)
NRBC BLD-RTO: 0 /100 WBC
PLATELET # BLD AUTO: 140 K/UL (ref 150–450)
PMV BLD AUTO: 10.4 FL (ref 9.2–12.9)
RBC # BLD AUTO: 3.93 M/UL (ref 4.6–6.2)
WBC # BLD AUTO: 9.51 K/UL (ref 3.9–12.7)

## 2021-12-09 PROCEDURE — 85025 COMPLETE CBC W/AUTO DIFF WBC: CPT | Performed by: STUDENT IN AN ORGANIZED HEALTH CARE EDUCATION/TRAINING PROGRAM

## 2021-12-09 PROCEDURE — 25000003 PHARM REV CODE 250: Performed by: STUDENT IN AN ORGANIZED HEALTH CARE EDUCATION/TRAINING PROGRAM

## 2021-12-09 PROCEDURE — 36415 COLL VENOUS BLD VENIPUNCTURE: CPT | Performed by: STUDENT IN AN ORGANIZED HEALTH CARE EDUCATION/TRAINING PROGRAM

## 2021-12-09 RX ORDER — OXYCODONE AND ACETAMINOPHEN 5; 325 MG/1; MG/1
1 TABLET ORAL EVERY 6 HOURS PRN
Qty: 15 TABLET | Refills: 0 | Status: SHIPPED | OUTPATIENT
Start: 2021-12-09

## 2021-12-09 RX ORDER — SULFAMETHOXAZOLE AND TRIMETHOPRIM 800; 160 MG/1; MG/1
1 TABLET ORAL 2 TIMES DAILY
Qty: 14 TABLET | Refills: 0 | Status: SHIPPED | OUTPATIENT
Start: 2021-12-09 | End: 2021-12-16

## 2021-12-09 RX ADMIN — SODIUM CHLORIDE: 0.9 INJECTION, SOLUTION INTRAVENOUS at 09:12

## 2021-12-09 RX ADMIN — TAMSULOSIN HYDROCHLORIDE 0.4 MG: 0.4 CAPSULE ORAL at 09:12

## 2021-12-09 RX ADMIN — FINASTERIDE 5 MG: 5 TABLET, FILM COATED ORAL at 09:12

## 2021-12-09 RX ADMIN — SODIUM CHLORIDE: 0.9 INJECTION, SOLUTION INTRAVENOUS at 02:12

## 2021-12-10 ENCOUNTER — NURSE TRIAGE (OUTPATIENT)
Dept: ADMINISTRATIVE | Facility: CLINIC | Age: 81
End: 2021-12-10
Payer: MEDICARE

## 2021-12-13 ENCOUNTER — TELEPHONE (OUTPATIENT)
Dept: UROLOGY | Facility: CLINIC | Age: 81
End: 2021-12-13
Payer: MEDICARE

## 2021-12-15 ENCOUNTER — CLINICAL SUPPORT (OUTPATIENT)
Dept: UROLOGY | Facility: CLINIC | Age: 81
End: 2021-12-15
Payer: MEDICARE

## 2021-12-15 ENCOUNTER — TELEPHONE (OUTPATIENT)
Dept: UROLOGY | Facility: CLINIC | Age: 81
End: 2021-12-15
Payer: MEDICARE

## 2021-12-15 VITALS — HEART RATE: 103 BPM | SYSTOLIC BLOOD PRESSURE: 117 MMHG | DIASTOLIC BLOOD PRESSURE: 72 MMHG

## 2021-12-15 PROCEDURE — 99999 PR PBB SHADOW E&M-EST. PATIENT-LVL II: ICD-10-PCS | Mod: PBBFAC,,,

## 2021-12-15 PROCEDURE — 99999 PR PBB SHADOW E&M-EST. PATIENT-LVL II: CPT | Mod: PBBFAC,,,

## 2021-12-23 ENCOUNTER — OFFICE VISIT (OUTPATIENT)
Dept: UROLOGY | Facility: CLINIC | Age: 81
End: 2021-12-23
Payer: MEDICARE

## 2021-12-23 VITALS
BODY MASS INDEX: 23.15 KG/M2 | RESPIRATION RATE: 18 BRPM | SYSTOLIC BLOOD PRESSURE: 142 MMHG | DIASTOLIC BLOOD PRESSURE: 75 MMHG | WEIGHT: 161.69 LBS | HEART RATE: 85 BPM | HEIGHT: 70 IN

## 2021-12-23 DIAGNOSIS — R33.9 INCOMPLETE EMPTYING OF BLADDER: ICD-10-CM

## 2021-12-23 DIAGNOSIS — N32.3 DIVERTICULA, BLADDER: ICD-10-CM

## 2021-12-23 DIAGNOSIS — R33.9 URINARY RETENTION: Primary | ICD-10-CM

## 2021-12-23 LAB
COMMENT: NORMAL
FINAL PATHOLOGIC DIAGNOSIS: NORMAL
GROSS: NORMAL
Lab: NORMAL
MICROSCOPIC EXAM: NORMAL
POC RESIDUAL URINE VOLUME: 299 ML (ref 0–100)

## 2021-12-23 PROCEDURE — 99024 POSTOP FOLLOW-UP VISIT: CPT | Mod: S$GLB,,, | Performed by: STUDENT IN AN ORGANIZED HEALTH CARE EDUCATION/TRAINING PROGRAM

## 2021-12-23 PROCEDURE — 1160F RVW MEDS BY RX/DR IN RCRD: CPT | Mod: CPTII,S$GLB,, | Performed by: STUDENT IN AN ORGANIZED HEALTH CARE EDUCATION/TRAINING PROGRAM

## 2021-12-23 PROCEDURE — 1101F PT FALLS ASSESS-DOCD LE1/YR: CPT | Mod: CPTII,S$GLB,, | Performed by: STUDENT IN AN ORGANIZED HEALTH CARE EDUCATION/TRAINING PROGRAM

## 2021-12-23 PROCEDURE — 3288F PR FALLS RISK ASSESSMENT DOCUMENTED: ICD-10-PCS | Mod: CPTII,S$GLB,, | Performed by: STUDENT IN AN ORGANIZED HEALTH CARE EDUCATION/TRAINING PROGRAM

## 2021-12-23 PROCEDURE — 1160F PR REVIEW ALL MEDS BY PRESCRIBER/CLIN PHARMACIST DOCUMENTED: ICD-10-PCS | Mod: CPTII,S$GLB,, | Performed by: STUDENT IN AN ORGANIZED HEALTH CARE EDUCATION/TRAINING PROGRAM

## 2021-12-23 PROCEDURE — 3077F PR MOST RECENT SYSTOLIC BLOOD PRESSURE >= 140 MM HG: ICD-10-PCS | Mod: CPTII,S$GLB,, | Performed by: STUDENT IN AN ORGANIZED HEALTH CARE EDUCATION/TRAINING PROGRAM

## 2021-12-23 PROCEDURE — 3077F SYST BP >= 140 MM HG: CPT | Mod: CPTII,S$GLB,, | Performed by: STUDENT IN AN ORGANIZED HEALTH CARE EDUCATION/TRAINING PROGRAM

## 2021-12-23 PROCEDURE — 1126F AMNT PAIN NOTED NONE PRSNT: CPT | Mod: CPTII,S$GLB,, | Performed by: STUDENT IN AN ORGANIZED HEALTH CARE EDUCATION/TRAINING PROGRAM

## 2021-12-23 PROCEDURE — 1159F PR MEDICATION LIST DOCUMENTED IN MEDICAL RECORD: ICD-10-PCS | Mod: CPTII,S$GLB,, | Performed by: STUDENT IN AN ORGANIZED HEALTH CARE EDUCATION/TRAINING PROGRAM

## 2021-12-23 PROCEDURE — 3078F DIAST BP <80 MM HG: CPT | Mod: CPTII,S$GLB,, | Performed by: STUDENT IN AN ORGANIZED HEALTH CARE EDUCATION/TRAINING PROGRAM

## 2021-12-23 PROCEDURE — 3078F PR MOST RECENT DIASTOLIC BLOOD PRESSURE < 80 MM HG: ICD-10-PCS | Mod: CPTII,S$GLB,, | Performed by: STUDENT IN AN ORGANIZED HEALTH CARE EDUCATION/TRAINING PROGRAM

## 2021-12-23 PROCEDURE — 1126F PR PAIN SEVERITY QUANTIFIED, NO PAIN PRESENT: ICD-10-PCS | Mod: CPTII,S$GLB,, | Performed by: STUDENT IN AN ORGANIZED HEALTH CARE EDUCATION/TRAINING PROGRAM

## 2021-12-23 PROCEDURE — 99999 PR PBB SHADOW E&M-EST. PATIENT-LVL III: ICD-10-PCS | Mod: PBBFAC,,, | Performed by: STUDENT IN AN ORGANIZED HEALTH CARE EDUCATION/TRAINING PROGRAM

## 2021-12-23 PROCEDURE — 3288F FALL RISK ASSESSMENT DOCD: CPT | Mod: CPTII,S$GLB,, | Performed by: STUDENT IN AN ORGANIZED HEALTH CARE EDUCATION/TRAINING PROGRAM

## 2021-12-23 PROCEDURE — 99024 PR POST-OP FOLLOW-UP VISIT: ICD-10-PCS | Mod: S$GLB,,, | Performed by: STUDENT IN AN ORGANIZED HEALTH CARE EDUCATION/TRAINING PROGRAM

## 2021-12-23 PROCEDURE — 1101F PR PT FALLS ASSESS DOC 0-1 FALLS W/OUT INJ PAST YR: ICD-10-PCS | Mod: CPTII,S$GLB,, | Performed by: STUDENT IN AN ORGANIZED HEALTH CARE EDUCATION/TRAINING PROGRAM

## 2021-12-23 PROCEDURE — 99999 PR PBB SHADOW E&M-EST. PATIENT-LVL III: CPT | Mod: PBBFAC,,, | Performed by: STUDENT IN AN ORGANIZED HEALTH CARE EDUCATION/TRAINING PROGRAM

## 2021-12-23 PROCEDURE — 1159F MED LIST DOCD IN RCRD: CPT | Mod: CPTII,S$GLB,, | Performed by: STUDENT IN AN ORGANIZED HEALTH CARE EDUCATION/TRAINING PROGRAM

## 2022-01-03 ENCOUNTER — TELEPHONE (OUTPATIENT)
Dept: UROLOGY | Facility: CLINIC | Age: 82
End: 2022-01-03
Payer: MEDICARE

## 2022-01-03 DIAGNOSIS — C61 PROSTATE CANCER: Primary | ICD-10-CM

## 2022-01-03 NOTE — TELEPHONE ENCOUNTER
----- Message from Jeanna Cardona MD sent at 1/3/2022  9:02 AM CST -----  Please let patient know that the prostate specimens from his prostate scraping procedure did demonstrate a small volume of prostate cancer - in his age group this could be an expected normal finding - I will put in an order for a PSA so we can continue to monitor. Please schedule his PSA for a few days before his upcoming visit with me.  If he wants a visit sooner to discuss that's ok too - schedule a visit this week (no psa check yet if he wants to see me this week)

## 2022-02-17 ENCOUNTER — OFFICE VISIT (OUTPATIENT)
Dept: UROLOGY | Facility: CLINIC | Age: 82
End: 2022-02-17
Payer: MEDICARE

## 2022-02-17 ENCOUNTER — LAB VISIT (OUTPATIENT)
Dept: LAB | Facility: HOSPITAL | Age: 82
End: 2022-02-17
Attending: STUDENT IN AN ORGANIZED HEALTH CARE EDUCATION/TRAINING PROGRAM
Payer: MEDICARE

## 2022-02-17 DIAGNOSIS — Z12.5 ENCOUNTER FOR SCREENING FOR MALIGNANT NEOPLASM OF PROSTATE: ICD-10-CM

## 2022-02-17 DIAGNOSIS — N32.3 BLADDER DIVERTICULUM: ICD-10-CM

## 2022-02-17 DIAGNOSIS — Z87.898 HISTORY OF URINARY RETENTION: ICD-10-CM

## 2022-02-17 DIAGNOSIS — C61 PROSTATE CANCER: ICD-10-CM

## 2022-02-17 DIAGNOSIS — N40.1 BPH WITH OBSTRUCTION/LOWER URINARY TRACT SYMPTOMS: Primary | ICD-10-CM

## 2022-02-17 DIAGNOSIS — N13.8 BPH WITH OBSTRUCTION/LOWER URINARY TRACT SYMPTOMS: Primary | ICD-10-CM

## 2022-02-17 LAB — COMPLEXED PSA SERPL-MCNC: 1.2 NG/ML (ref 0–4)

## 2022-02-17 PROCEDURE — 36415 COLL VENOUS BLD VENIPUNCTURE: CPT | Performed by: STUDENT IN AN ORGANIZED HEALTH CARE EDUCATION/TRAINING PROGRAM

## 2022-02-17 PROCEDURE — 1160F RVW MEDS BY RX/DR IN RCRD: CPT | Mod: CPTII,S$GLB,, | Performed by: STUDENT IN AN ORGANIZED HEALTH CARE EDUCATION/TRAINING PROGRAM

## 2022-02-17 PROCEDURE — 1160F PR REVIEW ALL MEDS BY PRESCRIBER/CLIN PHARMACIST DOCUMENTED: ICD-10-PCS | Mod: CPTII,S$GLB,, | Performed by: STUDENT IN AN ORGANIZED HEALTH CARE EDUCATION/TRAINING PROGRAM

## 2022-02-17 PROCEDURE — 99999 PR PBB SHADOW E&M-EST. PATIENT-LVL II: ICD-10-PCS | Mod: PBBFAC,,, | Performed by: STUDENT IN AN ORGANIZED HEALTH CARE EDUCATION/TRAINING PROGRAM

## 2022-02-17 PROCEDURE — 99999 PR PBB SHADOW E&M-EST. PATIENT-LVL II: CPT | Mod: PBBFAC,,, | Performed by: STUDENT IN AN ORGANIZED HEALTH CARE EDUCATION/TRAINING PROGRAM

## 2022-02-17 PROCEDURE — 84153 ASSAY OF PSA TOTAL: CPT | Performed by: STUDENT IN AN ORGANIZED HEALTH CARE EDUCATION/TRAINING PROGRAM

## 2022-02-17 PROCEDURE — 99024 POSTOP FOLLOW-UP VISIT: CPT | Mod: S$GLB,,, | Performed by: STUDENT IN AN ORGANIZED HEALTH CARE EDUCATION/TRAINING PROGRAM

## 2022-02-17 PROCEDURE — 51798 PR MEAS,POST-VOID RES,US,NON-IMAGING: ICD-10-PCS | Mod: S$GLB,,, | Performed by: STUDENT IN AN ORGANIZED HEALTH CARE EDUCATION/TRAINING PROGRAM

## 2022-02-17 PROCEDURE — 99024 PR POST-OP FOLLOW-UP VISIT: ICD-10-PCS | Mod: S$GLB,,, | Performed by: STUDENT IN AN ORGANIZED HEALTH CARE EDUCATION/TRAINING PROGRAM

## 2022-02-17 PROCEDURE — 1159F PR MEDICATION LIST DOCUMENTED IN MEDICAL RECORD: ICD-10-PCS | Mod: CPTII,S$GLB,, | Performed by: STUDENT IN AN ORGANIZED HEALTH CARE EDUCATION/TRAINING PROGRAM

## 2022-02-17 PROCEDURE — 51798 US URINE CAPACITY MEASURE: CPT | Mod: S$GLB,,, | Performed by: STUDENT IN AN ORGANIZED HEALTH CARE EDUCATION/TRAINING PROGRAM

## 2022-02-17 PROCEDURE — 1159F MED LIST DOCD IN RCRD: CPT | Mod: CPTII,S$GLB,, | Performed by: STUDENT IN AN ORGANIZED HEALTH CARE EDUCATION/TRAINING PROGRAM

## 2022-02-17 NOTE — PROGRESS NOTES
"Subjective:       Patient ID: Magedio Padua is a 82 y.o. male.    Chief Complaint:  F/u pvr check  This is a 82 y.o.  male patient that is an established patient of mine.   He has had the majority of his care for YEARS at [facility]. And decided to switch providers this year. He has a history of noncompliance as best as I can tell as he refused much of Dr. Ca's recommendations and often refused lisa reinsertion.    Chart review - 30 minutes - 60 pages from [facility] including 4 years of workup 2017-7/2021.   Pt has undergone numerous voiding trials, refused catheters in the past and refused TURP according to Dr. Wilber ca's notes. Pt was started on flomax and finasteride also. There are years of documentation that pts has failed multiple voiding trials and refused surgery. Pt also refused CIC and indwelling urethral lisa at times. Sometimes showing up for lisa exchanges and refusing lisa reinsertion at visit.   CT cystogram 10/6/20 report only - bilateral pelvic sidewall large fluid collections which fill with contrast - bilateral bladder diverticulum most likely.   TRUS vol performed by Dr. Ca 57g 3/16/21  Renal US 8/30/17 report only- normal kidneys, mild prostate enlargement, lisa present  Cr 1.1-1.2 2020.   Cysto note 3/9/21   Large prostate - enlarged lateral lobes  Does not seem like he ever underwent UDS as Dr. ca recommended.    My cystoscopy findings in summary 8/26/21:  Lateral lobe hypertrophy of the prostate - "kissing lobes."   Diffuse trabeculations  Cloudy urine  Large left bladder diverticulum noted, did not find entrance to right diverticulum likely due to poor visualization     Has been undergoing 18 Fr coude exchanges while awaiting UDS. He finally underwent UDS on 11/18/21 with Dr. Mendoza, findings:    Cystometrogram:     Position: Sitting             Filling Rate: 50 mL/sec   Catheter: 7F        Fluid:Conray         Cath PVR prior: N/A (catheter) Voiding Diary Capacity: Not " Applicable   First Sensation: 186 mL First Desire: 186 mL   Strong Desire: 186 mL Cystometric Capacity: 186 mL         Pdet at alf: 0 cm H2O Compliance: Normal         Detrusor Overactivity (DO): Absent  Volume first DO: No DO   Max DO Pressure: No DO Urgency Incontinence: Absent          Leak Point Pressure Testing:           Volume Tested: 150 mL       Abdominal Leak Point Pressure: No Leak   Stress Induced DO: Absent             Voiding Study:           Voided Volume: 28 mL PVR: 158 mL   Maximum Flow: 4 mL/sec Average Flow 2 mL/sec   Max Pdet: 92 cmH2O             Pdet at Max Flow: 92 cmH2O   EMG Storage: Normal Recruitment             EMG Voiding: Normal quiescence         Fluroscopic Imaging:           Bladder Contour: Diverticula Vesicoureteral Reflux:No VUR   Bladder Neck at Rest: closed Bladder Neck Voiding:Narrowed - Fixed         Impression:           Sensation:Delayed     Capacity: Small     Compliance:Normal     Detrusor Overactivity:Absent     Continence: No Incontinence     Contractility: Bladder Outlet Obstruction     Emptying:Unsatisfactory - Bladder Outlet Obstruction     Coordination:Coordinated Voiding              Summary:  This study was performed in accordance with the current AUA/SUFU Guideline on Adult Urodynamics. On filling phase he demonstrates delayed first and strong desire with a small bladder capacity. There is no detrusor overactivity (DO) demonstrated on this exam (though absence of DO on urodynamic evaluation does not exclude it as causative agent of urgency symptoms). Bladder compliance at capacity is reduced. On fluoroscopy, the bladder contour demonstrates two large bladder diverticula. There is no VUR demonstrated on this exam.      On stress testing, with adequate cough and valsalva effort, there is no DERRICK demonstrated and patient reports no clinical history of DERRICK.     On voiding phase, he haley a voluntary detrusor contraction that results in poor flow. This appears to be  the result of bladder outlet obstruction. His urethral catheter was displaced from the bladder mid-way through voiding but voiding pressures are at least 92 cm/H2O. At a maximum flow rate of 4 ml/sec, BOOI is at least 84, well above the limit associate with BAINS. On VCUG, the level of obstruction appears to be at the bladder neck where a large prostate protrudes into the bladder base. The diverticula empty poorly.      The catheter was replaced. Patient has good contractility but poor emptying secondary to BAINS. Would address the outlet to see how well diverticula empty as they may empty well once outlet resistance is reduced.     12/23/21  He is s/p TURP with me on 12/8/21. He underwent a successful voiding trial on 12/15/21. He is here today for a f/u. His initial bladder scan demonstrated 450cc, but after he voided it was closer to 300cc. Pt reports not feeling distended. He is urinating frequently. Denies dysuria. Some intermittent hematuria which is improving.     2/17/22  PVR around 300cc, stable. Pt notes he does urinate frequently q30 min but has not gone into retention again postop. Satisfied without catheter.   Gl 3+4 prostate cancer in 7% of chips.       Lab Results   Component Value Date    CREATININE 1.0 11/30/2021       ---  Past Medical History:   Diagnosis Date    Abdominal pain     High cholesterol        Past Surgical History:   Procedure Laterality Date    COLONOSCOPY N/A 8/4/2016    Procedure: History of melena/Colonoscopy;  Surgeon: Alejandro Borrero MD;  Location[facility];  Service: Endoscopy;  Laterality: N/A;    CYSTOSCOPY WITH URODYNAMIC TESTING N/A 11/18/2021    Procedure: CYSTOSCOPY, WITH URODYNAMIC TESTING FLOUROSCOPIC;  Surgeon: Palmer Mendoza MD;  [address];  Service: Urology;  Laterality: N/A;  1HR    TRANSURETHRAL RESECTION OF PROSTATE N/A 12/8/2021    Procedure: TURP (TRANSURETHRAL RESECTION OF PROSTATE);  Surgeon: Jeanna Cardona MD;  Location: [facility];   Service: Urology;  Laterality: N/A;       No family history on file.    Social History     Tobacco Use    Smoking status: Never Smoker    Smokeless tobacco: Never Used   Substance Use Topics    Alcohol use: Yes     Comment: occasional beer    Drug use: Never       Current Outpatient Medications on File Prior to Visit   Medication Sig Dispense Refill    COMBIGAN 0.2-0.5 % Drop       finasteride (PROSCAR) 5 mg tablet Take 1 tablet by mouth once daily.      mupirocin (BACTROBAN) 2 % ointment       oxyCODONE-acetaminophen (PERCOCET) 5-325 mg per tablet Take 1 tablet by mouth every 6 (six) hours as needed for Pain. 15 tablet 0    tamsulosin (FLOMAX) 0.4 mg Cap Take 1 capsule by mouth once daily.       No current facility-administered medications on file prior to visit.       Review of patient's allergies indicates:  No Known Allergies    Review of Systems   Constitutional: Negative for chills.   HENT: Negative for congestion.    Eyes: Negative for visual disturbance.   Respiratory: Negative for shortness of breath.    Cardiovascular: Negative for chest pain.   Gastrointestinal: Negative for abdominal distention.   Musculoskeletal: Negative for gait problem.   Skin: Negative for color change.   Neurological: Negative for dizziness.   Psychiatric/Behavioral: Negative for agitation.       Objective:      Physical Exam  Constitutional:       Appearance: He is well-developed and well-nourished.   HENT:      Head: Normocephalic.   Eyes:      Pupils: Pupils are equal, round, and reactive to light.   Cardiovascular:      Pulses: Intact distal pulses.   Pulmonary:      Effort: Pulmonary effort is normal.   Abdominal:      Palpations: Abdomen is soft.   Musculoskeletal:         General: Normal range of motion.      Cervical back: Normal range of motion.   Skin:     General: Skin is warm and dry.   Neurological:      Mental Status: He is alert.   Psychiatric:         Mood and Affect: Mood and affect normal.          Assessment:       1. BPH with obstruction/lower urinary tract symptoms    2. History of urinary retention    3. Bladder diverticulum    4. Prostate cancer    5. Encounter for screening for malignant neoplasm of prostate         Plan:         Plan:  1. Matt 3+4 prostate cancer in 7% of chips cT1b.  Prostate adenocarcinoma, [place] grade 3 + 4 = 7 (5% pattern 4), involving 7% total of 19/194 chips.   2. PSA today. Based off of that, most likely would offer watchful waiting based off of his age group.   3. Recall for 6 months. If PSA tomorrow not concerning, then will check next PSA in 6 months.         BPH with obstruction/lower urinary tract symptoms  -     POCT Bladder Scan    History of urinary retention    Bladder diverticulum    Prostate cancer  -     PSA, Screening; Future; Expected date: 02/17/2022    Encounter for screening for malignant neoplasm of prostate   -     PSA, Screening; Future; Expected date: 02/17/2022

## 2022-02-18 ENCOUNTER — TELEPHONE (OUTPATIENT)
Dept: UROLOGY | Facility: CLINIC | Age: 82
End: 2022-02-18
Payer: MEDICARE

## 2022-02-18 DIAGNOSIS — C61 PROSTATE CANCER: Primary | ICD-10-CM

## 2022-02-18 NOTE — TELEPHONE ENCOUNTER
----- Message from Jeanna Cardona MD sent at 2/18/2022  3:23 PM CST -----  Please let patient know his PSA resulted in a good level, we will check another one in 6 months after he has come back from his overseas travel. Please schedule PSA and f/u with me in office after.

## 2022-02-21 ENCOUNTER — HOSPITAL ENCOUNTER (OUTPATIENT)
Dept: RADIOLOGY | Facility: HOSPITAL | Age: 82
Discharge: HOME | End: 2022-02-21
Attending: FAMILY MEDICINE
Payer: MEDICARE

## 2022-02-21 ENCOUNTER — TELEPHONE (OUTPATIENT)
Dept: UROLOGY | Facility: CLINIC | Age: 82
End: 2022-02-21
Payer: MEDICARE

## 2022-02-21 DIAGNOSIS — M54.50 LUMBAR PAIN: Primary | ICD-10-CM

## 2022-02-21 DIAGNOSIS — M54.50 LUMBAR PAIN: ICD-10-CM

## 2022-02-21 DIAGNOSIS — M54.2 CERVICAL PAIN: ICD-10-CM

## 2022-02-21 DIAGNOSIS — M54.2 CERVICAL PAIN: Primary | ICD-10-CM

## 2022-02-21 PROCEDURE — 72100 XR LUMBAR SPINE 2 OR 3 VIEWS: ICD-10-PCS | Mod: 26,,, | Performed by: STUDENT IN AN ORGANIZED HEALTH CARE EDUCATION/TRAINING PROGRAM

## 2022-02-21 PROCEDURE — 72040 X-RAY EXAM NECK SPINE 2-3 VW: CPT | Mod: 26,,, | Performed by: STUDENT IN AN ORGANIZED HEALTH CARE EDUCATION/TRAINING PROGRAM

## 2022-02-21 PROCEDURE — 72040 XR CERVICAL SPINE 2 OR 3 VIEWS: ICD-10-PCS | Mod: 26,,, | Performed by: STUDENT IN AN ORGANIZED HEALTH CARE EDUCATION/TRAINING PROGRAM

## 2022-02-21 PROCEDURE — 72100 X-RAY EXAM L-S SPINE 2/3 VWS: CPT | Mod: 26,,, | Performed by: STUDENT IN AN ORGANIZED HEALTH CARE EDUCATION/TRAINING PROGRAM

## 2022-02-21 PROCEDURE — 72100 X-RAY EXAM L-S SPINE 2/3 VWS: CPT | Mod: TC,FY

## 2022-02-21 PROCEDURE — 72040 X-RAY EXAM NECK SPINE 2-3 VW: CPT | Mod: TC,FY

## 2022-02-21 NOTE — TELEPHONE ENCOUNTER
Spoke with patient gave test results and inform patient about his recall for 6 months for an PSA and follow up with the doctor. Patient voice his understanding

## 2022-06-30 ENCOUNTER — OFFICE VISIT (OUTPATIENT)
Dept: UROLOGY | Facility: CLINIC | Age: 82
End: 2022-06-30
Payer: MEDICARE

## 2022-06-30 VITALS — HEART RATE: 76 BPM | DIASTOLIC BLOOD PRESSURE: 79 MMHG | SYSTOLIC BLOOD PRESSURE: 158 MMHG

## 2022-06-30 DIAGNOSIS — R33.9 INCOMPLETE EMPTYING OF BLADDER: ICD-10-CM

## 2022-06-30 DIAGNOSIS — C61 PROSTATE CANCER: Primary | ICD-10-CM

## 2022-06-30 DIAGNOSIS — K40.90 UNILATERAL INGUINAL HERNIA WITHOUT OBSTRUCTION OR GANGRENE, RECURRENCE NOT SPECIFIED: ICD-10-CM

## 2022-06-30 DIAGNOSIS — Z87.898 HISTORY OF URINARY RETENTION: ICD-10-CM

## 2022-06-30 PROCEDURE — 99999 PR PBB SHADOW E&M-EST. PATIENT-LVL III: CPT | Mod: PBBFAC,,, | Performed by: STUDENT IN AN ORGANIZED HEALTH CARE EDUCATION/TRAINING PROGRAM

## 2022-06-30 PROCEDURE — 99999 PR PBB SHADOW E&M-EST. PATIENT-LVL III: ICD-10-PCS | Mod: PBBFAC,,, | Performed by: STUDENT IN AN ORGANIZED HEALTH CARE EDUCATION/TRAINING PROGRAM

## 2022-06-30 PROCEDURE — 1160F RVW MEDS BY RX/DR IN RCRD: CPT | Mod: CPTII,S$GLB,, | Performed by: STUDENT IN AN ORGANIZED HEALTH CARE EDUCATION/TRAINING PROGRAM

## 2022-06-30 PROCEDURE — 1126F AMNT PAIN NOTED NONE PRSNT: CPT | Mod: CPTII,S$GLB,, | Performed by: STUDENT IN AN ORGANIZED HEALTH CARE EDUCATION/TRAINING PROGRAM

## 2022-06-30 PROCEDURE — 3078F DIAST BP <80 MM HG: CPT | Mod: CPTII,S$GLB,, | Performed by: STUDENT IN AN ORGANIZED HEALTH CARE EDUCATION/TRAINING PROGRAM

## 2022-06-30 PROCEDURE — 99214 PR OFFICE/OUTPT VISIT, EST, LEVL IV, 30-39 MIN: ICD-10-PCS | Mod: S$GLB,,, | Performed by: STUDENT IN AN ORGANIZED HEALTH CARE EDUCATION/TRAINING PROGRAM

## 2022-06-30 PROCEDURE — 1159F PR MEDICATION LIST DOCUMENTED IN MEDICAL RECORD: ICD-10-PCS | Mod: CPTII,S$GLB,, | Performed by: STUDENT IN AN ORGANIZED HEALTH CARE EDUCATION/TRAINING PROGRAM

## 2022-06-30 PROCEDURE — 3077F SYST BP >= 140 MM HG: CPT | Mod: CPTII,S$GLB,, | Performed by: STUDENT IN AN ORGANIZED HEALTH CARE EDUCATION/TRAINING PROGRAM

## 2022-06-30 PROCEDURE — 3077F PR MOST RECENT SYSTOLIC BLOOD PRESSURE >= 140 MM HG: ICD-10-PCS | Mod: CPTII,S$GLB,, | Performed by: STUDENT IN AN ORGANIZED HEALTH CARE EDUCATION/TRAINING PROGRAM

## 2022-06-30 PROCEDURE — 99214 OFFICE O/P EST MOD 30 MIN: CPT | Mod: S$GLB,,, | Performed by: STUDENT IN AN ORGANIZED HEALTH CARE EDUCATION/TRAINING PROGRAM

## 2022-06-30 PROCEDURE — 3078F PR MOST RECENT DIASTOLIC BLOOD PRESSURE < 80 MM HG: ICD-10-PCS | Mod: CPTII,S$GLB,, | Performed by: STUDENT IN AN ORGANIZED HEALTH CARE EDUCATION/TRAINING PROGRAM

## 2022-06-30 PROCEDURE — 1160F PR REVIEW ALL MEDS BY PRESCRIBER/CLIN PHARMACIST DOCUMENTED: ICD-10-PCS | Mod: CPTII,S$GLB,, | Performed by: STUDENT IN AN ORGANIZED HEALTH CARE EDUCATION/TRAINING PROGRAM

## 2022-06-30 PROCEDURE — 1126F PR PAIN SEVERITY QUANTIFIED, NO PAIN PRESENT: ICD-10-PCS | Mod: CPTII,S$GLB,, | Performed by: STUDENT IN AN ORGANIZED HEALTH CARE EDUCATION/TRAINING PROGRAM

## 2022-06-30 PROCEDURE — 1159F MED LIST DOCD IN RCRD: CPT | Mod: CPTII,S$GLB,, | Performed by: STUDENT IN AN ORGANIZED HEALTH CARE EDUCATION/TRAINING PROGRAM

## 2022-06-30 NOTE — PROGRESS NOTES
"Subjective:       Patient ID: Magedio Padua is a 82 y.o. male.    Chief Complaint:  f/u  This is a 82 y.o.  male patient that is an established patient of mine.  He has had the majority of his care for years at [facility]. And decided to switch providers in 2021. He has a history of noncompliance as best as I can tell as he refused much of Dr. Ca's recommendations and often refused lisa reinsertion.    Chart review - 30 minutes - 60 pages from [facility] including 4 years of workup 2017-7/2021.   Pt has undergone numerous voiding trials, refused catheters in the past and refused TURP according to Dr. Wilber ca's notes. Pt was started on flomax and finasteride also. There are years of documentation that pts has failed multiple voiding trials and refused surgery. Pt also refused CIC and indwelling urethral lisa at times. Sometimes showing up for lisa exchanges and refusing lisa reinsertion at visit.   CT cystogram 10/6/20 report only - bilateral pelvic sidewall large fluid collections which fill with contrast - bilateral bladder diverticulum most likely.   TRUS vol performed by Dr. Ca 57g 3/16/21  Renal US 8/30/17 report only- normal kidneys, mild prostate enlargement, lisa present  Cr 1.1-1.2 2020.   Cysto note 3/9/21   Large prostate - enlarged lateral lobes  Does not seem like he ever underwent UDS as Dr. ca recommended.    My cystoscopy findings in summary 8/26/21:  Lateral lobe hypertrophy of the prostate - "kissing lobes."   Diffuse trabeculations  Cloudy urine  Large left bladder diverticulum noted, did not find entrance to right diverticulum likely due to poor visualization     Has been undergoing 18 Fr coude exchanges while awaiting UDS. He finally underwent UDS on 11/18/21 with Dr. Mendoza, findings:    Cystometrogram:     Position: Sitting             Filling Rate: 50 mL/sec   Catheter: 7F        Fluid:Conray         Cath PVR prior: N/A (catheter) Voiding Diary Capacity: Not Applicable   First " Sensation: 186 mL First Desire: 186 mL   Strong Desire: 186 mL Cystometric Capacity: 186 mL         Pdet at skilled nursing: 0 cm H2O Compliance: Normal         Detrusor Overactivity (DO): Absent  Volume first DO: No DO   Max DO Pressure: No DO Urgency Incontinence: Absent          Leak Point Pressure Testing:           Volume Tested: 150 mL       Abdominal Leak Point Pressure: No Leak   Stress Induced DO: Absent             Voiding Study:           Voided Volume: 28 mL PVR: 158 mL   Maximum Flow: 4 mL/sec Average Flow 2 mL/sec   Max Pdet: 92 cmH2O             Pdet at Max Flow: 92 cmH2O   EMG Storage: Normal Recruitment             EMG Voiding: Normal quiescence         Fluroscopic Imaging:           Bladder Contour: Diverticula Vesicoureteral Reflux:No VUR   Bladder Neck at Rest: closed Bladder Neck Voiding:Narrowed - Fixed         Impression:           Sensation:Delayed     Capacity: Small     Compliance:Normal     Detrusor Overactivity:Absent     Continence: No Incontinence     Contractility: Bladder Outlet Obstruction     Emptying:Unsatisfactory - Bladder Outlet Obstruction     Coordination:Coordinated Voiding              Summary:  This study was performed in accordance with the current AUA/SUFU Guideline on Adult Urodynamics. On filling phase he demonstrates delayed first and strong desire with a small bladder capacity. There is no detrusor overactivity (DO) demonstrated on this exam (though absence of DO on urodynamic evaluation does not exclude it as causative agent of urgency symptoms). Bladder compliance at capacity is reduced. On fluoroscopy, the bladder contour demonstrates two large bladder diverticula. There is no VUR demonstrated on this exam.      On stress testing, with adequate cough and valsalva effort, there is no DERRICK demonstrated and patient reports no clinical history of DERRICK.     On voiding phase, he haley a voluntary detrusor contraction that results in poor flow. This appears to be the result of bladder  outlet obstruction. His urethral catheter was displaced from the bladder mid-way through voiding but voiding pressures are at least 92 cm/H2O. At a maximum flow rate of 4 ml/sec, BOOI is at least 84, well above the limit associate with BAINS. On VCUG, the level of obstruction appears to be at the bladder neck where a large prostate protrudes into the bladder base. The diverticula empty poorly.      The catheter was replaced. Patient has good contractility but poor emptying secondary to BAINS. Would address the outlet to see how well diverticula empty as they may empty well once outlet resistance is reduced.     He is s/p TURP with me on 12/8/21. He underwent a successful voiding trial on 12/15/21. PVRs have been around 300cc postop. Gl 3+4 prostate cancer in 7% of chips.     6/30/22  Pt returns back as he was in the [facility] and had images done for right groin pain.   pvr on US from [facility] 209.87cc  Scrotal US performed for right scrotal pain - noted right hernia   He notes swelling has greatly improved, not bothersome.       LAST PSA  Lab Results   Component Value Date    PSA 1.2 02/17/2022       Lab Results   Component Value Date    CREATININE 1.0 11/30/2021       ---  Past Medical History:   Diagnosis Date    Abdominal pain     High cholesterol        Past Surgical History:   Procedure Laterality Date    COLONOSCOPY N/A 8/4/2016    Procedure: History of melena/Colonoscopy;  Surgeon: Alejandro Borrero MD;  Location: [facility];  Service: Endoscopy;  Laterality: N/A;    CYSTOSCOPY WITH URODYNAMIC TESTING N/A 11/18/2021    Procedure: CYSTOSCOPY, WITH URODYNAMIC TESTING FLOUROSCOPIC;  Surgeon: Palmer Mendoza MD;  [address];  Service: Urology;  Laterality: N/A;  1HR    TRANSURETHRAL RESECTION OF PROSTATE N/A 12/8/2021    Procedure: TURP (TRANSURETHRAL RESECTION OF PROSTATE);  Surgeon: Jeanna Cardona MD;  Location: [facility];  Service: Urology;  Laterality: N/A;       History reviewed. No  pertinent family history.    Social History     Tobacco Use    Smoking status: Never Smoker    Smokeless tobacco: Never Used   Substance Use Topics    Alcohol use: Yes     Comment: occasional beer    Drug use: Never       Current Outpatient Medications on File Prior to Visit   Medication Sig Dispense Refill    COMBIGAN 0.2-0.5 % Drop       finasteride (PROSCAR) 5 mg tablet Take 1 tablet by mouth once daily.      tamsulosin (FLOMAX) 0.4 mg Cap Take 1 capsule by mouth once daily.      mupirocin (BACTROBAN) 2 % ointment       oxyCODONE-acetaminophen (PERCOCET) 5-325 mg per tablet Take 1 tablet by mouth every 6 (six) hours as needed for Pain. 15 tablet 0     No current facility-administered medications on file prior to visit.       Review of patient's allergies indicates:  No Known Allergies    Review of Systems   Constitutional: Negative for chills.   HENT: Negative for congestion.    Eyes: Negative for visual disturbance.   Respiratory: Negative for shortness of breath.    Cardiovascular: Negative for chest pain.   Gastrointestinal: Negative for abdominal distention.   Musculoskeletal: Negative for gait problem.   Skin: Negative for color change.   Neurological: Negative for dizziness.   Psychiatric/Behavioral: Negative for agitation.       Objective:      Physical Exam  Constitutional:       Appearance: He is well-developed.   HENT:      Head: Normocephalic.   Eyes:      Pupils: Pupils are equal, round, and reactive to light.   Pulmonary:      Effort: Pulmonary effort is normal.   Abdominal:      Palpations: Abdomen is soft.   Musculoskeletal:         General: Normal range of motion.      Cervical back: Normal range of motion.   Skin:     General: Skin is warm and dry.   Neurological:      Mental Status: He is alert.         Assessment:       1. Prostate cancer    2. Incomplete emptying of bladder    3. History of urinary retention    4. Unilateral inguinal hernia without obstruction or gangrene, recurrence  not specified        Plan:         Plan:  1. Matt 3+4 prostate cancer in 7% of chips cT1b.  Prostate adenocarcinoma, [place] grade 3 + 4 = 7 (5% pattern 4), involving 7% total of 19/194 chips.   2. Will continue to check PSAs q6 months. Continue watchful waiting based off of his age group. Next PSA in 2 months 8/2022.  3. US reassuring from PVR check. Right hernia, since not bothersome currently I did not rec general surgery consult. If it does become bothersome he will let me know.          Prostate cancer  -     Prostate Specific Antigen, Diagnostic; Future; Expected date: 06/30/2022    Incomplete emptying of bladder    History of urinary retention    Unilateral inguinal hernia without obstruction or gangrene, recurrence not specified

## 2022-08-26 ENCOUNTER — LAB VISIT (OUTPATIENT)
Dept: LAB | Facility: HOSPITAL | Age: 82
End: 2022-08-26
Attending: STUDENT IN AN ORGANIZED HEALTH CARE EDUCATION/TRAINING PROGRAM
Payer: MEDICARE

## 2022-08-26 DIAGNOSIS — C61 PROSTATE CANCER: ICD-10-CM

## 2022-08-26 LAB — COMPLEXED PSA SERPL-MCNC: 0.93 NG/ML (ref 0–4)

## 2022-08-26 PROCEDURE — 84153 ASSAY OF PSA TOTAL: CPT | Performed by: STUDENT IN AN ORGANIZED HEALTH CARE EDUCATION/TRAINING PROGRAM

## 2022-08-26 PROCEDURE — 36415 COLL VENOUS BLD VENIPUNCTURE: CPT | Performed by: STUDENT IN AN ORGANIZED HEALTH CARE EDUCATION/TRAINING PROGRAM

## 2022-08-30 ENCOUNTER — OFFICE VISIT (OUTPATIENT)
Dept: UROLOGY | Facility: CLINIC | Age: 82
End: 2022-08-30
Payer: MEDICARE

## 2022-08-30 VITALS
DIASTOLIC BLOOD PRESSURE: 68 MMHG | WEIGHT: 167.44 LBS | HEART RATE: 84 BPM | SYSTOLIC BLOOD PRESSURE: 121 MMHG | HEIGHT: 70 IN | BODY MASS INDEX: 23.97 KG/M2

## 2022-08-30 DIAGNOSIS — N52.9 ERECTILE DYSFUNCTION, UNSPECIFIED ERECTILE DYSFUNCTION TYPE: ICD-10-CM

## 2022-08-30 DIAGNOSIS — K40.90 NON-RECURRENT UNILATERAL INGUINAL HERNIA WITHOUT OBSTRUCTION OR GANGRENE: ICD-10-CM

## 2022-08-30 DIAGNOSIS — Z87.898 HISTORY OF URINARY RETENTION: ICD-10-CM

## 2022-08-30 DIAGNOSIS — N40.1 BPH WITH OBSTRUCTION/LOWER URINARY TRACT SYMPTOMS: ICD-10-CM

## 2022-08-30 DIAGNOSIS — N13.8 BPH WITH OBSTRUCTION/LOWER URINARY TRACT SYMPTOMS: ICD-10-CM

## 2022-08-30 DIAGNOSIS — C61 PROSTATE CANCER: Primary | ICD-10-CM

## 2022-08-30 LAB — POC RESIDUAL URINE VOLUME: 285 ML (ref 0–100)

## 2022-08-30 PROCEDURE — 99214 OFFICE O/P EST MOD 30 MIN: CPT | Mod: S$GLB,,, | Performed by: STUDENT IN AN ORGANIZED HEALTH CARE EDUCATION/TRAINING PROGRAM

## 2022-08-30 PROCEDURE — 99214 PR OFFICE/OUTPT VISIT, EST, LEVL IV, 30-39 MIN: ICD-10-PCS | Mod: S$GLB,,, | Performed by: STUDENT IN AN ORGANIZED HEALTH CARE EDUCATION/TRAINING PROGRAM

## 2022-08-30 PROCEDURE — 3078F DIAST BP <80 MM HG: CPT | Mod: CPTII,S$GLB,, | Performed by: STUDENT IN AN ORGANIZED HEALTH CARE EDUCATION/TRAINING PROGRAM

## 2022-08-30 PROCEDURE — 1159F PR MEDICATION LIST DOCUMENTED IN MEDICAL RECORD: ICD-10-PCS | Mod: CPTII,S$GLB,, | Performed by: STUDENT IN AN ORGANIZED HEALTH CARE EDUCATION/TRAINING PROGRAM

## 2022-08-30 PROCEDURE — 51798 US URINE CAPACITY MEASURE: CPT | Mod: S$GLB,,, | Performed by: STUDENT IN AN ORGANIZED HEALTH CARE EDUCATION/TRAINING PROGRAM

## 2022-08-30 PROCEDURE — 99999 PR PBB SHADOW E&M-EST. PATIENT-LVL III: CPT | Mod: PBBFAC,,, | Performed by: STUDENT IN AN ORGANIZED HEALTH CARE EDUCATION/TRAINING PROGRAM

## 2022-08-30 PROCEDURE — 3074F SYST BP LT 130 MM HG: CPT | Mod: CPTII,S$GLB,, | Performed by: STUDENT IN AN ORGANIZED HEALTH CARE EDUCATION/TRAINING PROGRAM

## 2022-08-30 PROCEDURE — 1159F MED LIST DOCD IN RCRD: CPT | Mod: CPTII,S$GLB,, | Performed by: STUDENT IN AN ORGANIZED HEALTH CARE EDUCATION/TRAINING PROGRAM

## 2022-08-30 PROCEDURE — 3074F PR MOST RECENT SYSTOLIC BLOOD PRESSURE < 130 MM HG: ICD-10-PCS | Mod: CPTII,S$GLB,, | Performed by: STUDENT IN AN ORGANIZED HEALTH CARE EDUCATION/TRAINING PROGRAM

## 2022-08-30 PROCEDURE — 1160F RVW MEDS BY RX/DR IN RCRD: CPT | Mod: CPTII,S$GLB,, | Performed by: STUDENT IN AN ORGANIZED HEALTH CARE EDUCATION/TRAINING PROGRAM

## 2022-08-30 PROCEDURE — 51798 PR MEAS,POST-VOID RES,US,NON-IMAGING: ICD-10-PCS | Mod: S$GLB,,, | Performed by: STUDENT IN AN ORGANIZED HEALTH CARE EDUCATION/TRAINING PROGRAM

## 2022-08-30 PROCEDURE — 3078F PR MOST RECENT DIASTOLIC BLOOD PRESSURE < 80 MM HG: ICD-10-PCS | Mod: CPTII,S$GLB,, | Performed by: STUDENT IN AN ORGANIZED HEALTH CARE EDUCATION/TRAINING PROGRAM

## 2022-08-30 PROCEDURE — 1160F PR REVIEW ALL MEDS BY PRESCRIBER/CLIN PHARMACIST DOCUMENTED: ICD-10-PCS | Mod: CPTII,S$GLB,, | Performed by: STUDENT IN AN ORGANIZED HEALTH CARE EDUCATION/TRAINING PROGRAM

## 2022-08-30 PROCEDURE — 99999 PR PBB SHADOW E&M-EST. PATIENT-LVL III: ICD-10-PCS | Mod: PBBFAC,,, | Performed by: STUDENT IN AN ORGANIZED HEALTH CARE EDUCATION/TRAINING PROGRAM

## 2022-08-30 RX ORDER — VARDENAFIL HYDROCHLORIDE 20 MG/1
20 TABLET ORAL
Qty: 6 TABLET | Refills: 11 | Status: SHIPPED | OUTPATIENT
Start: 2022-08-30 | End: 2023-08-30

## 2022-08-30 RX ORDER — LISINOPRIL 10 MG/1
10 TABLET ORAL DAILY
COMMUNITY
Start: 2022-03-26

## 2022-08-30 NOTE — PROGRESS NOTES
"Subjective:       Patient ID: Magedio Padua is a 82 y.o. male.    Chief Complaint:  PSA and PVR check  This is a 82 y.o.  male patient that is an established patient of mine.     He has had the majority of his care for years at [facility]. And decided to switch providers in 2021. He has a history of noncompliance as best as I can tell as he refused much of Dr. Ca's recommendations and often refused lisa reinsertion.    Chart review - 30 minutes - 60 pages from [facility] including 4 years of workup 2017-7/2021.   Pt has undergone numerous voiding trials, refused catheters in the past and refused TURP according to Dr. Wilber ca's notes. Pt was started on flomax and finasteride also. There are years of documentation that pts has failed multiple voiding trials and refused surgery. Pt also refused CIC and indwelling urethral lisa at times. Sometimes showing up for lisa exchanges and refusing lisa reinsertion at visit.   CT cystogram 10/6/20 report only - bilateral pelvic sidewall large fluid collections which fill with contrast - bilateral bladder diverticulum most likely.   TRUS vol performed by Dr. Ca 57g 3/16/21  Renal US 8/30/17 report only- normal kidneys, mild prostate enlargement, lisa present  Cr 1.1-1.2 2020.   Cysto note 3/9/21   Large prostate - enlarged lateral lobes  Does not seem like he ever underwent UDS as Dr. ca recommended.    My cystoscopy findings in summary 8/26/21:  Lateral lobe hypertrophy of the prostate - "kissing lobes."   Diffuse trabeculations  Cloudy urine  Large left bladder diverticulum noted, did not find entrance to right diverticulum likely due to poor visualization     Has been undergoing 18 Fr coude exchanges while awaiting UDS. He finally underwent UDS on 11/18/21 with Dr. Mendoza, findings:    Cystometrogram:      Position: Sitting             Filling Rate: 50 mL/sec   Catheter: 7F        Fluid:Conray         Cath PVR prior: N/A (catheter) Voiding Diary Capacity: Not " Applicable   First Sensation: 186 mL First Desire: 186 mL   Strong Desire: 186 mL Cystometric Capacity: 186 mL         Pdet at FDC: 0 cm H2O Compliance: Normal         Detrusor Overactivity (DO): Absent  Volume first DO: No DO   Max DO Pressure: No DO Urgency Incontinence: Absent          Leak Point Pressure Testing:           Volume Tested: 150 mL       Abdominal Leak Point Pressure: No Leak   Stress Induced DO: Absent             Voiding Study:           Voided Volume: 28 mL PVR: 158 mL   Maximum Flow: 4 mL/sec Average Flow 2 mL/sec   Max Pdet: 92 cmH2O             Pdet at Max Flow: 92 cmH2O   EMG Storage: Normal Recruitment             EMG Voiding: Normal quiescence         Fluroscopic Imaging:           Bladder Contour: Diverticula Vesicoureteral Reflux:No VUR   Bladder Neck at Rest: closed Bladder Neck Voiding:Narrowed - Fixed         Impression:           Sensation:Delayed     Capacity: Small     Compliance:Normal     Detrusor Overactivity:Absent     Continence: No Incontinence     Contractility: Bladder Outlet Obstruction     Emptying:Unsatisfactory - Bladder Outlet Obstruction     Coordination:Coordinated Voiding              Summary:  This study was performed in accordance with the current AUA/SUFU Guideline on Adult Urodynamics. On filling phase he demonstrates delayed first and strong desire with a small bladder capacity. There is no detrusor overactivity (DO) demonstrated on this exam (though absence of DO on urodynamic evaluation does not exclude it as causative agent of urgency symptoms). Bladder compliance at capacity is reduced. On fluoroscopy, the bladder contour demonstrates two large bladder diverticula. There is no VUR demonstrated on this exam.      On stress testing, with adequate cough and valsalva effort, there is no DERRICK demonstrated and patient reports no clinical history of DERRICK.     On voiding phase, he haley a voluntary detrusor contraction that results in poor flow. This appears to be  the result of bladder outlet obstruction. His urethral catheter was displaced from the bladder mid-way through voiding but voiding pressures are at least 92 cm/H2O. At a maximum flow rate of 4 ml/sec, BOOI is at least 84, well above the limit associate with BAINS. On VCUG, the level of obstruction appears to be at the bladder neck where a large prostate protrudes into the bladder base. The diverticula empty poorly.      The catheter was replaced. Patient has good contractility but poor emptying secondary to BAINS. Would address the outlet to see how well diverticula empty as they may empty well once outlet resistance is reduced.     He is s/p TURP with me on 12/8/21. He underwent a successful voiding trial on 12/15/21. PVRs have been around 300cc postop. Gl 3+4 prostate cancer in 7% of chips.     6/30/22  Pt returns back as he was in the [facility] and had images done for right groin pain.   pvr on US from [facility] 209.87cc  Scrotal US performed for right scrotal pain - noted right hernia   He notes swelling has greatly improved, not bothersome.     8/30/22  F/u PSA obtained 8/26/22 for monitoring of cT1b prostate cancer noted on TURP specimens. PSA stable 0.93. no issues with urination. Now interested in general surgery referral as hernia is bothersome when he strains or lifts.   PVR - 285cc        LAST PSA  Lab Results   Component Value Date    PSA 1.2 02/17/2022    PSADIAG 0.93 08/26/2022       Lab Results   Component Value Date    CREATININE 1.0 11/30/2021       ---  Past Medical History:   Diagnosis Date    Abdominal pain     High cholesterol        Past Surgical History:   Procedure Laterality Date    COLONOSCOPY N/A 8/4/2016    Procedure: History of melena/Colonoscopy;  Surgeon: Alejandro Borrero MD;  Location: [facility];  Service: Endoscopy;  Laterality: N/A;    CYSTOSCOPY WITH URODYNAMIC TESTING N/A 11/18/2021    Procedure: CYSTOSCOPY, WITH URODYNAMIC TESTING FLOUROSCOPIC;  Surgeon: Palmer Mendoza  MD;  [facility];  Service: Urology;  Laterality: N/A;  1HR    TRANSURETHRAL RESECTION OF PROSTATE N/A 12/8/2021    Procedure: TURP (TRANSURETHRAL RESECTION OF PROSTATE);  Surgeon: Jeanna Cardona MD;  Location: [facility] OR;  Service: Urology;  Laterality: N/A;       History reviewed. No pertinent family history.    Social History     Tobacco Use    Smoking status: Never    Smokeless tobacco: Never   Substance Use Topics    Alcohol use: Yes     Comment: occasional beer    Drug use: Never       Current Outpatient Medications on File Prior to Visit   Medication Sig Dispense Refill    COMBIGAN 0.2-0.5 % Drop       finasteride (PROSCAR) 5 mg tablet Take 1 tablet by mouth once daily.      lisinopriL 10 MG tablet Take 10 mg by mouth once daily.      mupirocin (BACTROBAN) 2 % ointment       oxyCODONE-acetaminophen (PERCOCET) 5-325 mg per tablet Take 1 tablet by mouth every 6 (six) hours as needed for Pain. 15 tablet 0    tamsulosin (FLOMAX) 0.4 mg Cap Take 1 capsule by mouth once daily.       No current facility-administered medications on file prior to visit.       Review of patient's allergies indicates:  No Known Allergies    Review of Systems   Constitutional:  Negative for chills.   HENT:  Negative for congestion.    Eyes:  Negative for visual disturbance.   Respiratory:  Negative for shortness of breath.    Cardiovascular:  Negative for chest pain.   Gastrointestinal:  Negative for abdominal distention.   Musculoskeletal:  Negative for gait problem.   Skin:  Negative for color change.   Neurological:  Negative for dizziness.   Psychiatric/Behavioral:  Negative for agitation.      Objective:      Physical Exam  Constitutional:       Appearance: He is well-developed.   HENT:      Head: Normocephalic.   Eyes:      Pupils: Pupils are equal, round, and reactive to light.   Pulmonary:      Effort: Pulmonary effort is normal.   Abdominal:      Palpations: Abdomen is soft.   Musculoskeletal:         General: Normal  range of motion.      Cervical back: Normal range of motion.   Skin:     General: Skin is warm and dry.   Neurological:      Mental Status: He is alert.       Assessment:       1. Prostate cancer    2. Non-recurrent unilateral inguinal hernia without obstruction or gangrene    3. Erectile dysfunction, unspecified erectile dysfunction type    4. BPH with obstruction/lower urinary tract symptoms    5. History of urinary retention        Plan:           Plan:  1. [place] 3+4 prostate cancer in 7% of chips cT1b.  Prostate adenocarcinoma, [place] grade 3 + 4 = 7 (5% pattern 4), involving 7% total of 19/194 chips. Will continue to check PSAs q6 months. Continue watchful waiting based off of his age group. Next PSA in 6 months 2/2023.   2. PVR stable.   3. Pt now would like referral to general surgery for right hernia that was discovered in the [facility] with a scrotal US.  4. Requesting levitra for ED.          Prostate cancer  -     Prostate Specific Antigen, Diagnostic; Future; Expected date: 02/28/2023  -     POCT Bladder Scan    Non-recurrent unilateral inguinal hernia without obstruction or gangrene  -     Ambulatory referral/consult to General Surgery; Future; Expected date: 09/06/2022    Erectile dysfunction, unspecified erectile dysfunction type  -     vardenafiL (LEVITRA) 20 MG tablet; Take 1 tablet (20 mg total) by mouth as needed for Erectile Dysfunction.  Dispense: 6 tablet; Refill: 11    BPH with obstruction/lower urinary tract symptoms    History of urinary retention

## 2022-09-27 ENCOUNTER — TELEPHONE (OUTPATIENT)
Dept: ADMINISTRATIVE | Facility: OTHER | Age: 82
End: 2022-09-27
Payer: MEDICARE

## 2022-10-10 ENCOUNTER — TELEPHONE (OUTPATIENT)
Dept: UROLOGY | Facility: CLINIC | Age: 82
End: 2022-10-10
Payer: MEDICARE

## 2022-10-10 NOTE — TELEPHONE ENCOUNTER
Spoke with patient,he just wanted to inform office that he has an appointment with Dr. Ramachandran.

## 2022-10-11 ENCOUNTER — TELEPHONE (OUTPATIENT)
Dept: SURGERY | Facility: CLINIC | Age: 82
End: 2022-10-11
Payer: MEDICARE

## 2022-10-11 NOTE — TELEPHONE ENCOUNTER
10/11/2022                      0830  Attempted to contact patient, via the language line,  regarding his appointment today. No answer. Left voicemail.

## 2022-10-14 ENCOUNTER — OFFICE VISIT (OUTPATIENT)
Dept: SURGERY | Facility: CLINIC | Age: 82
End: 2022-10-14
Payer: MEDICARE

## 2022-10-14 VITALS
HEIGHT: 70 IN | BODY MASS INDEX: 24.2 KG/M2 | DIASTOLIC BLOOD PRESSURE: 69 MMHG | SYSTOLIC BLOOD PRESSURE: 127 MMHG | WEIGHT: 169 LBS | HEART RATE: 62 BPM

## 2022-10-14 DIAGNOSIS — R22.41 LOCALIZED SWELLING, MASS AND LUMP, RIGHT LOWER LIMB: ICD-10-CM

## 2022-10-14 DIAGNOSIS — R10.31 RIGHT GROIN PAIN: Primary | ICD-10-CM

## 2022-10-14 PROCEDURE — 1159F MED LIST DOCD IN RCRD: CPT | Mod: CPTII,S$GLB,, | Performed by: SURGERY

## 2022-10-14 PROCEDURE — 1101F PT FALLS ASSESS-DOCD LE1/YR: CPT | Mod: CPTII,S$GLB,, | Performed by: SURGERY

## 2022-10-14 PROCEDURE — 1126F PR PAIN SEVERITY QUANTIFIED, NO PAIN PRESENT: ICD-10-PCS | Mod: CPTII,S$GLB,, | Performed by: SURGERY

## 2022-10-14 PROCEDURE — 3078F PR MOST RECENT DIASTOLIC BLOOD PRESSURE < 80 MM HG: ICD-10-PCS | Mod: CPTII,S$GLB,, | Performed by: SURGERY

## 2022-10-14 PROCEDURE — 99204 PR OFFICE/OUTPT VISIT, NEW, LEVL IV, 45-59 MIN: ICD-10-PCS | Mod: S$GLB,,, | Performed by: SURGERY

## 2022-10-14 PROCEDURE — 1126F AMNT PAIN NOTED NONE PRSNT: CPT | Mod: CPTII,S$GLB,, | Performed by: SURGERY

## 2022-10-14 PROCEDURE — 99204 OFFICE O/P NEW MOD 45 MIN: CPT | Mod: S$GLB,,, | Performed by: SURGERY

## 2022-10-14 PROCEDURE — 3288F FALL RISK ASSESSMENT DOCD: CPT | Mod: CPTII,S$GLB,, | Performed by: SURGERY

## 2022-10-14 PROCEDURE — 3078F DIAST BP <80 MM HG: CPT | Mod: CPTII,S$GLB,, | Performed by: SURGERY

## 2022-10-14 PROCEDURE — 3288F PR FALLS RISK ASSESSMENT DOCUMENTED: ICD-10-PCS | Mod: CPTII,S$GLB,, | Performed by: SURGERY

## 2022-10-14 PROCEDURE — 1160F PR REVIEW ALL MEDS BY PRESCRIBER/CLIN PHARMACIST DOCUMENTED: ICD-10-PCS | Mod: CPTII,S$GLB,, | Performed by: SURGERY

## 2022-10-14 PROCEDURE — 1101F PR PT FALLS ASSESS DOC 0-1 FALLS W/OUT INJ PAST YR: ICD-10-PCS | Mod: CPTII,S$GLB,, | Performed by: SURGERY

## 2022-10-14 PROCEDURE — 1159F PR MEDICATION LIST DOCUMENTED IN MEDICAL RECORD: ICD-10-PCS | Mod: CPTII,S$GLB,, | Performed by: SURGERY

## 2022-10-14 PROCEDURE — 3074F SYST BP LT 130 MM HG: CPT | Mod: CPTII,S$GLB,, | Performed by: SURGERY

## 2022-10-14 PROCEDURE — 99999 PR PBB SHADOW E&M-EST. PATIENT-LVL III: CPT | Mod: PBBFAC,,, | Performed by: SURGERY

## 2022-10-14 PROCEDURE — 1160F RVW MEDS BY RX/DR IN RCRD: CPT | Mod: CPTII,S$GLB,, | Performed by: SURGERY

## 2022-10-14 PROCEDURE — 99999 PR PBB SHADOW E&M-EST. PATIENT-LVL III: ICD-10-PCS | Mod: PBBFAC,,, | Performed by: SURGERY

## 2022-10-14 PROCEDURE — 3074F PR MOST RECENT SYSTOLIC BLOOD PRESSURE < 130 MM HG: ICD-10-PCS | Mod: CPTII,S$GLB,, | Performed by: SURGERY

## 2022-10-14 NOTE — PROGRESS NOTES
[facility]  OUTPATIENT H&P    REASON FOR VISIT/CC:  Possible right inguinal hernia    HPI: Magedio Padua is a 82 y.o. male with some vague right groin discomfort.  He notes occasionally his right scrotum enlarged.  He underwent an ultrasound the [facility] earlier this year that showed a possible right inguinal hernia as well as hydroceles.  He is otherwise at his baseline state of health.  He denies changes in bowel habits, fevers/chills, or nausea/vomiting.    I have reviewed the patient's chart including prior progress notes, procedures and testing.     ROS:   Review of Systems   All other systems reviewed and are negative.    PROBLEM LIST:  Patient Active Problem List   Diagnosis    Dark stools    Melena    Gastritis    Screening for colon cancer         HISTORY  Past Medical History:   Diagnosis Date    Abdominal pain     High cholesterol        Past Surgical History:   Procedure Laterality Date    COLONOSCOPY N/A 8/4/2016    Procedure: History of melena/Colonoscopy;  Surgeon: Alejandro Borrero MD;  Location: [facility];  Service: Endoscopy;  Laterality: N/A;    CYSTOSCOPY WITH URODYNAMIC TESTING N/A 11/18/2021    Procedure: CYSTOSCOPY, WITH URODYNAMIC TESTING FLOUROSCOPIC;  Surgeon: Palmer Mendoza MD;  Location: [address];  Service: Urology;  Laterality: N/A;  1HR    TRANSURETHRAL RESECTION OF PROSTATE N/A 12/8/2021    Procedure: TURP (TRANSURETHRAL RESECTION OF PROSTATE);  Surgeon: Jeanna Cardona MD;  Location: [facility];  Service: Urology;  Laterality: N/A;       Social History     Tobacco Use    Smoking status: Never    Smokeless tobacco: Never   Substance Use Topics    Alcohol use: Yes     Comment: occasional beer    Drug use: Never       No family history on file.      MEDS:  Current Outpatient Medications on File Prior to Visit   Medication Sig Dispense Refill    COMBIGAN 0.2-0.5 % Drop       finasteride (PROSCAR) 5 mg tablet Take 1 tablet by mouth once daily.      tamsulosin  (FLOMAX) 0.4 mg Cap Take 1 capsule by mouth once daily.      lisinopriL 10 MG tablet Take 10 mg by mouth once daily.      mupirocin (BACTROBAN) 2 % ointment       oxyCODONE-acetaminophen (PERCOCET) 5-325 mg per tablet Take 1 tablet by mouth every 6 (six) hours as needed for Pain. (Patient not taking: Reported on 10/14/2022) 15 tablet 0    vardenafiL (LEVITRA) 20 MG tablet Take 1 tablet (20 mg total) by mouth as needed for Erectile Dysfunction. (Patient not taking: Reported on 10/14/2022) 6 tablet 11     No current facility-administered medications on file prior to visit.       ALLERGIES:  Review of patient's allergies indicates:  No Known Allergies      VITALS:  Vitals:    10/14/22 0951   BP: 127/69   Pulse: 62         PHYSICAL EXAM:  Physical Exam  Constitutional:       Appearance: Normal appearance.   HENT:      Head: Normocephalic and atraumatic.   Pulmonary:      Effort: Pulmonary effort is normal.   Abdominal:      Palpations: Abdomen is soft.   Genitourinary:     Comments: No palpable hernia on either side  Neurological:      Mental Status: Mental status is at baseline.         LABS:  Lab Results   Component Value Date    WBC 9.51 12/09/2021    RBC 3.93 (L) 12/09/2021    HGB 12.1 (L) 12/09/2021    HCT 36.9 (L) 12/09/2021     (L) 12/09/2021     Lab Results   Component Value Date     11/30/2021     11/30/2021    K 3.9 11/30/2021     11/30/2021    CO2 27 11/30/2021    BUN 12 11/30/2021    CREATININE 1.0 11/30/2021    CALCIUM 8.6 (L) 11/30/2021     No results found for: ALT, AST, GGT, ALKPHOS, BILITOT  No results found for: MG, PHOS    STUDIES:  U/S images and reports were personally reviewed.        ASSESSMENT & PLAN:  82 y.o. male , will need U/S here, no hernia on exam.      Nile Ramachandran M.D., F.A.C.S.  [facility] General Surgery  [facility]

## 2022-10-18 ENCOUNTER — HOSPITAL ENCOUNTER (OUTPATIENT)
Dept: RADIOLOGY | Facility: HOSPITAL | Age: 82
Discharge: HOME | End: 2022-10-18
Attending: SURGERY
Payer: MEDICARE

## 2022-10-18 DIAGNOSIS — R22.41 LOCALIZED SWELLING, MASS AND LUMP, RIGHT LOWER LIMB: ICD-10-CM

## 2022-10-18 DIAGNOSIS — R10.31 RIGHT GROIN PAIN: ICD-10-CM

## 2022-10-18 PROCEDURE — 76705 ECHO EXAM OF ABDOMEN: CPT | Mod: 26,,, | Performed by: RADIOLOGY

## 2022-10-18 PROCEDURE — 76705 US ABDOMEN LIMITED: ICD-10-PCS | Mod: 26,,, | Performed by: RADIOLOGY

## 2022-10-18 PROCEDURE — 76705 ECHO EXAM OF ABDOMEN: CPT | Mod: TC

## 2022-10-25 ENCOUNTER — OFFICE VISIT (OUTPATIENT)
Dept: SURGERY | Facility: CLINIC | Age: 82
End: 2022-10-25
Payer: MEDICARE

## 2022-10-25 ENCOUNTER — TELEPHONE (OUTPATIENT)
Dept: SURGERY | Facility: CLINIC | Age: 82
End: 2022-10-25
Payer: MEDICARE

## 2022-10-25 DIAGNOSIS — R10.31 RIGHT GROIN PAIN: Primary | ICD-10-CM

## 2022-10-25 PROCEDURE — 1160F PR REVIEW ALL MEDS BY PRESCRIBER/CLIN PHARMACIST DOCUMENTED: ICD-10-PCS | Mod: CPTII,95,, | Performed by: SURGERY

## 2022-10-25 PROCEDURE — 1159F MED LIST DOCD IN RCRD: CPT | Mod: CPTII,95,, | Performed by: SURGERY

## 2022-10-25 PROCEDURE — 1159F PR MEDICATION LIST DOCUMENTED IN MEDICAL RECORD: ICD-10-PCS | Mod: CPTII,95,, | Performed by: SURGERY

## 2022-10-25 PROCEDURE — 99441 PR PHYSICIAN TELEPHONE EVALUATION 5-10 MIN: ICD-10-PCS | Mod: 95,,, | Performed by: SURGERY

## 2022-10-25 PROCEDURE — 1160F RVW MEDS BY RX/DR IN RCRD: CPT | Mod: CPTII,95,, | Performed by: SURGERY

## 2022-10-25 PROCEDURE — 99441 PR PHYSICIAN TELEPHONE EVALUATION 5-10 MIN: CPT | Mod: 95,,, | Performed by: SURGERY

## 2022-10-25 NOTE — PROGRESS NOTES
Established Patient - Audio Only Telehealth Visit     The patient location is: Home  The chief complaint leading to consultation is: U/S report  Visit type: Virtual visit with audio only (telephone)  Total time spent with patient: 10 minutes including US review       The reason for the audio only service rather than synchronous audio and video virtual visit was related to technical difficulties or patient preference/necessity.     Each patient to whom I provide medical services by telemedicine is:  (1) informed of the relationship between the physician and patient and the respective role of any other health care provider with respect to management of the patient; and (2) notified that they may decline to receive medical services by telemedicine and may withdraw from such care at any time. Patient verbally consented to receive this service via voice-only telephone call.       HPI: Spoke to pt's niece at his request.  U/S is negative for hernia.  She reports he is feeling well.     Assessment and plan:  F/U as needed                        This service was not originating from a related E/M service provided within the previous 7 days nor will  to an E/M service or procedure within the next 24 hours or my soonest available appointment.  Prevailing standard of care was able to be met in this audio-only visit.

## 2022-10-25 NOTE — TELEPHONE ENCOUNTER
----- Message from Shahnaz Tejeda sent at 10/25/2022 10:39 AM CDT -----  Type:  Patient Returning Call     Who Called: Irene (niece)  Who Left Message for Patient:office  Does the patient know what this is regarding?:yes   Would the patient rather a call back or a response via [facility]? Call   Best Call Back Number: [phone]  Additional Information:    Calling back to get results from ultra sound.

## 2022-10-25 NOTE — TELEPHONE ENCOUNTER
----- Message from Yamila Dolan sent at 10/25/2022 10:08 AM CDT -----  Type:  Patient Returning Call    Who Called:kit bhat   Who Left Message for Patient:  Does the patient know what this is regarding?:yes   Would the patient rather a call back or a response via [facility]? Call   Best Call Back Number: [phone]  Additional Information:    Calling back to get results from ultra sound.

## 2024-08-15 ENCOUNTER — OFFICE VISIT (OUTPATIENT)
Dept: UROLOGY | Facility: CLINIC | Age: 84
End: 2024-08-15
Payer: MEDICARE

## 2024-08-15 VITALS
WEIGHT: 155.88 LBS | BODY MASS INDEX: 22.32 KG/M2 | HEART RATE: 80 BPM | DIASTOLIC BLOOD PRESSURE: 88 MMHG | HEIGHT: 70 IN | SYSTOLIC BLOOD PRESSURE: 153 MMHG

## 2024-08-15 DIAGNOSIS — R10.2 SUPRAPUBIC PAIN, ACUTE: ICD-10-CM

## 2024-08-15 DIAGNOSIS — R30.0 DYSURIA: Primary | ICD-10-CM

## 2024-08-15 LAB
BILIRUB SERPL-MCNC: ABNORMAL MG/DL
BLOOD URINE, POC: ABNORMAL
CLARITY, POC UA: CLEAR
COLOR, POC UA: YELLOW
GLUCOSE UR QL STRIP: ABNORMAL
KETONES UR QL STRIP: ABNORMAL
LEUKOCYTE ESTERASE URINE, POC: ABNORMAL
NITRITE, POC UA: ABNORMAL
PH, POC UA: 5
POC RESIDUAL URINE VOLUME: 16 ML (ref 0–100)
PROTEIN, POC: ABNORMAL
SPECIFIC GRAVITY, POC UA: 1.01
UROBILINOGEN, POC UA: ABNORMAL

## 2024-08-15 PROCEDURE — 87186 SC STD MICRODIL/AGAR DIL: CPT

## 2024-08-15 PROCEDURE — 99999 PR PBB SHADOW E&M-EST. PATIENT-LVL III: CPT | Mod: PBBFAC,,,

## 2024-08-15 PROCEDURE — 87086 URINE CULTURE/COLONY COUNT: CPT

## 2024-08-15 PROCEDURE — 99213 OFFICE O/P EST LOW 20 MIN: CPT | Mod: S$GLB,,,

## 2024-08-15 PROCEDURE — 51798 US URINE CAPACITY MEASURE: CPT | Mod: S$GLB,,,

## 2024-08-15 PROCEDURE — 87088 URINE BACTERIA CULTURE: CPT

## 2024-08-15 PROCEDURE — 1159F MED LIST DOCD IN RCRD: CPT | Mod: CPTII,S$GLB,,

## 2024-08-15 PROCEDURE — 81002 URINALYSIS NONAUTO W/O SCOPE: CPT | Mod: S$GLB,,,

## 2024-08-15 PROCEDURE — 3079F DIAST BP 80-89 MM HG: CPT | Mod: CPTII,S$GLB,,

## 2024-08-15 PROCEDURE — 1126F AMNT PAIN NOTED NONE PRSNT: CPT | Mod: CPTII,S$GLB,,

## 2024-08-15 PROCEDURE — 3077F SYST BP >= 140 MM HG: CPT | Mod: CPTII,S$GLB,,

## 2024-08-15 RX ORDER — SULFAMETHOXAZOLE AND TRIMETHOPRIM 800; 160 MG/1; MG/1
1 TABLET ORAL 2 TIMES DAILY
Qty: 14 TABLET | Refills: 0 | Status: SHIPPED | OUTPATIENT
Start: 2024-08-15 | End: 2024-08-22

## 2024-08-15 RX ORDER — LOSARTAN POTASSIUM 100 MG/1
100 TABLET ORAL
COMMUNITY
Start: 2024-03-16

## 2024-08-15 RX ORDER — CARBAMAZEPINE 200 MG/1
200 TABLET ORAL 3 TIMES DAILY
COMMUNITY
Start: 2024-03-25

## 2024-08-15 RX ORDER — ATORVASTATIN CALCIUM 40 MG/1
40 TABLET, FILM COATED ORAL
COMMUNITY
Start: 2024-03-25

## 2024-08-15 RX ORDER — AMLODIPINE BESYLATE 10 MG/1
10 TABLET ORAL
COMMUNITY
Start: 2024-03-25

## 2024-08-15 RX ORDER — LISINOPRIL AND HYDROCHLOROTHIAZIDE 12.5; 2 MG/1; MG/1
1 TABLET ORAL EVERY MORNING
COMMUNITY
Start: 2024-07-11

## 2024-08-15 NOTE — PROGRESS NOTES
Subjective:       Patient ID: Magedio Padua is a 84 y.o. male.    Chief Complaint: painful urination     This is a 84 y.o.  male patient that is new to me.  This is a past patient of Dr. Cardona who was last seen in 2021.  Urologic history consists of:  2021- TURP with Dr. Cardona due to bladder outlet obstruction after cysto and UDS.  Pathology from TURP showed-- Gl 3+4 prostate cancer in 7% of chips.   PSAs have been stable  PVRs with Dr. Cardona have been anywhere from 200s-300s.  Today patient reports pain with urination that started about two weeks ago. Reports that he has suprapubic pain that is described as aching and only occurs when he is urinating. Denies dysuria, hematuria, fevers, chills, flank pain, urinary frequency or urgency, weak stream.   Reports that he is taking flomax and proscar daily in the morning.  Reports that he drinks plenty of water-- 7-8 16oz bottles of water and coffee every morning.   PVR 16ml immediately after urinating in clinic  Urine dipstick- positive for Leukocytes, negative for RBC, and Nitrites.     LAST PSA  Lab Results   Component Value Date    PSA 1.2 02/17/2022    PSADIAG 0.93 08/26/2022       Lab Results   Component Value Date    CREATININE 1.0 11/30/2021       ---  PMH/PSH/Medications/Allergies/Social history reviewed and as in chart.    Review of Systems   Constitutional:  Negative for activity change, chills and fever.   Respiratory:  Negative for shortness of breath.    Cardiovascular:  Negative for chest pain and palpitations.   Gastrointestinal:  Positive for abdominal pain (suprapubic pain while urinating). Negative for constipation.   Genitourinary:  Negative for difficulty urinating, dysuria, flank pain, frequency, hematuria and urgency.   Neurological:  Negative for dizziness and light-headedness.       Objective:      Physical Exam  HENT:      Head: Normocephalic.   Pulmonary:      Effort: Pulmonary effort is normal.   Abdominal:      General: Abdomen is flat.       Palpations: Abdomen is soft.   Genitourinary:     Prostate: Not enlarged, not tender and no nodules present.      Comments: Prostate- smooth, firm without lesions or nodules palpated.  Musculoskeletal:         General: Normal range of motion.      Cervical back: Normal range of motion.   Skin:     General: Skin is warm and dry.   Neurological:      Mental Status: He is alert and oriented to person, place, and time.         Assessment:     Problem Noted   Suprapubic Pain, Acute 8/15/2024       Plan:     Urine sent for urine culture, rx sent for bactrim BID x7 days, side effects reviewed with the patient and he voiced understanding.  Continue staying adequately hydrated.  Avoid bladder irritants including but not limited to caffeine, alcohol, smoking, spicy foods, acidic foods, tomato-based products, citrus, artificial sweeteners, chocolate, coffee or tea.    Continue taking flomax and finasteride daily.  Follow-up 2 weeks.    STEPHON Vickers    I spent a total of 25 minutes on the day of the visit.This includes face to face time and non-face to face time preparing to see the patient (eg, review of tests), obtaining and/or reviewing separately obtained history, documenting clinical information in the electronic or other health record, independently interpreting results and communicating results to the patient/family/caregiver, or care coordinator.

## 2024-08-15 NOTE — PATIENT INSTRUCTIONS
Continue to take flomax and finasteride daily.  Start taking bactrim two times daily for 7 days  Avoid bladder irritants including but not limited to caffeine, alcohol, smoking, spicy foods, acidic foods, tomato-based products, citrus, artificial sweeteners, chocolate, coffee or tea.     Pt presents to the ED with left upper dental pain and is here for evaluation. Pt stated pain not relieved with OTC tylenol. Denies any fever, chills, or night sweats.

## 2024-08-16 LAB — BACTERIA UR CULT: ABNORMAL

## 2024-08-20 ENCOUNTER — TELEPHONE (OUTPATIENT)
Dept: UROLOGY | Facility: CLINIC | Age: 84
End: 2024-08-20
Payer: MEDICARE

## 2024-08-20 NOTE — TELEPHONE ENCOUNTER
Attempted to call Mr. Padua to discuss urine culture results but phone number in the chart is not a working number. Attempted to call his friend Mr. Garner with no answer, left a voicemail to have Mr. Padua call the clinic back and left the clinic number. Will attempt to call again later.  -Ashwini Johnson NP

## 2024-08-21 ENCOUNTER — TELEPHONE (OUTPATIENT)
Dept: UROLOGY | Facility: CLINIC | Age: 84
End: 2024-08-21
Payer: MEDICARE

## 2024-08-21 NOTE — TELEPHONE ENCOUNTER
Attempted to call patient again but phone number available is not in service and patient is not active on the portal.  -Ashwini Johnson NP

## 2024-09-05 ENCOUNTER — LAB VISIT (OUTPATIENT)
Dept: LAB | Facility: HOSPITAL | Age: 84
End: 2024-09-05
Payer: MEDICARE

## 2024-09-05 ENCOUNTER — OFFICE VISIT (OUTPATIENT)
Dept: UROLOGY | Facility: CLINIC | Age: 84
End: 2024-09-05
Payer: MEDICARE

## 2024-09-05 VITALS
DIASTOLIC BLOOD PRESSURE: 67 MMHG | SYSTOLIC BLOOD PRESSURE: 167 MMHG | HEART RATE: 69 BPM | HEIGHT: 70 IN | BODY MASS INDEX: 22.67 KG/M2 | WEIGHT: 158.38 LBS

## 2024-09-05 DIAGNOSIS — R30.0 DYSURIA: ICD-10-CM

## 2024-09-05 DIAGNOSIS — N32.0 BLADDER-NECK OBSTRUCTION: ICD-10-CM

## 2024-09-05 DIAGNOSIS — R30.0 DYSURIA: Primary | ICD-10-CM

## 2024-09-05 PROBLEM — R10.2 SUPRAPUBIC PAIN, ACUTE: Status: RESOLVED | Noted: 2024-08-15 | Resolved: 2024-09-05

## 2024-09-05 LAB
ANION GAP SERPL CALC-SCNC: 10 MMOL/L (ref 8–16)
BACTERIA #/AREA URNS AUTO: ABNORMAL /HPF
BILIRUB SERPL-MCNC: ABNORMAL MG/DL
BLOOD URINE, POC: ABNORMAL
BUN SERPL-MCNC: 16 MG/DL (ref 8–23)
CALCIUM SERPL-MCNC: 9.3 MG/DL (ref 8.7–10.5)
CHLORIDE SERPL-SCNC: 107 MMOL/L (ref 95–110)
CLARITY, POC UA: CLEAR
CO2 SERPL-SCNC: 24 MMOL/L (ref 23–29)
COLOR, POC UA: YELLOW
CREAT SERPL-MCNC: 1.1 MG/DL (ref 0.5–1.4)
EST. GFR  (NO RACE VARIABLE): >60 ML/MIN/1.73 M^2
GLUCOSE SERPL-MCNC: 108 MG/DL (ref 70–110)
GLUCOSE UR QL STRIP: ABNORMAL
KETONES UR QL STRIP: ABNORMAL
LEUKOCYTE ESTERASE URINE, POC: ABNORMAL
MICROSCOPIC COMMENT: ABNORMAL
NITRITE, POC UA: ABNORMAL
PH, POC UA: 5
POC RESIDUAL URINE VOLUME: 23 ML (ref 0–100)
POTASSIUM SERPL-SCNC: 4.2 MMOL/L (ref 3.5–5.1)
PROTEIN, POC: ABNORMAL
RBC #/AREA URNS AUTO: 3 /HPF (ref 0–4)
SODIUM SERPL-SCNC: 141 MMOL/L (ref 136–145)
SPECIFIC GRAVITY, POC UA: 1.01
UROBILINOGEN, POC UA: ABNORMAL
WBC #/AREA URNS AUTO: 87 /HPF (ref 0–5)
WBC CLUMPS UR QL AUTO: ABNORMAL

## 2024-09-05 PROCEDURE — 81001 URINALYSIS AUTO W/SCOPE: CPT

## 2024-09-05 PROCEDURE — 80048 BASIC METABOLIC PNL TOTAL CA: CPT

## 2024-09-05 PROCEDURE — 1159F MED LIST DOCD IN RCRD: CPT | Mod: CPTII,S$GLB,,

## 2024-09-05 PROCEDURE — 51798 US URINE CAPACITY MEASURE: CPT | Mod: S$GLB,,,

## 2024-09-05 PROCEDURE — 81002 URINALYSIS NONAUTO W/O SCOPE: CPT | Mod: S$GLB,,,

## 2024-09-05 PROCEDURE — 87086 URINE CULTURE/COLONY COUNT: CPT

## 2024-09-05 PROCEDURE — 99213 OFFICE O/P EST LOW 20 MIN: CPT | Mod: S$GLB,,,

## 2024-09-05 PROCEDURE — 1160F RVW MEDS BY RX/DR IN RCRD: CPT | Mod: CPTII,S$GLB,,

## 2024-09-05 PROCEDURE — 1126F AMNT PAIN NOTED NONE PRSNT: CPT | Mod: CPTII,S$GLB,,

## 2024-09-05 PROCEDURE — 87088 URINE BACTERIA CULTURE: CPT

## 2024-09-05 PROCEDURE — 99999 PR PBB SHADOW E&M-EST. PATIENT-LVL IV: CPT | Mod: PBBFAC,,,

## 2024-09-05 PROCEDURE — 3077F SYST BP >= 140 MM HG: CPT | Mod: CPTII,S$GLB,,

## 2024-09-05 PROCEDURE — 3078F DIAST BP <80 MM HG: CPT | Mod: CPTII,S$GLB,,

## 2024-09-05 PROCEDURE — 87186 SC STD MICRODIL/AGAR DIL: CPT

## 2024-09-05 PROCEDURE — 36415 COLL VENOUS BLD VENIPUNCTURE: CPT

## 2024-09-05 RX ORDER — OFLOXACIN 3 MG/ML
SOLUTION AURICULAR (OTIC)
COMMUNITY
Start: 2024-08-15

## 2024-09-05 RX ORDER — SULFAMETHOXAZOLE AND TRIMETHOPRIM 800; 160 MG/1; MG/1
1 TABLET ORAL 2 TIMES DAILY
Qty: 42 TABLET | Refills: 0 | Status: SHIPPED | OUTPATIENT
Start: 2024-09-05 | End: 2024-09-05

## 2024-09-05 RX ORDER — CEPHALEXIN 500 MG/1
500 CAPSULE ORAL 4 TIMES DAILY
Qty: 40 CAPSULE | Refills: 0 | Status: SHIPPED | OUTPATIENT
Start: 2024-09-05 | End: 2024-09-15

## 2024-09-05 NOTE — PROGRESS NOTES
Subjective:       Patient ID: Magedio Padua is a 84 y.o. male.    Chief Complaint: painful urination     This is a 84 y.o.  male patient that is new to me.  This is a past patient of Dr. Cardona who was last seen in 2021.  Urologic history consists of:  2021- TURP with Dr. Cardona due to bladder outlet obstruction after cysto and UDS.  Pathology from TURP showed-- Gl 3+4 prostate cancer in 7% of chips.   PSAs have been stable  PVRs with Dr. Cardona have been anywhere from 200s-300s.  Today patient reports pain with urination that started about two weeks ago. Reports that he has suprapubic pain that is described as aching and only occurs when he is urinating. Denies dysuria, hematuria, fevers, chills, flank pain, urinary frequency or urgency, weak stream.   Reports that he is taking flomax and proscar daily in the morning.  Reports that he drinks plenty of water-- 7-8 16oz bottles of water and coffee every morning.   PVR 16ml immediately after urinating in clinic  Urine culture--  positive for pseudomonias aerginosa >100,000, patient was started on 7 day course of bactrim  9/5/24-- Patient here today for f/u. Reports that he finshed bactrim. Reports that he is still having dysuria. Reports that the suprapubic pain has resolved. Denies fever, pain, gross hematuria.  PVR 23ml immediately after urinating in clinic  Urine dipstick- positive for Leukocytes, RBC, and negative for Nitrites.   Patient was unable to get in touch with to discuss urine culture results. He gave me a new cell number while in clinic today.    LAST PSA  Lab Results   Component Value Date    PSA 1.2 02/17/2022    PSADIAG 0.93 08/26/2022       Lab Results   Component Value Date    CREATININE 1.0 11/30/2021       ---  PMH/PSH/Medications/Allergies/Social history reviewed and as in chart.    Review of Systems   Constitutional:  Negative for activity change, chills and fever.   Respiratory:  Negative for shortness of breath.    Cardiovascular:  Negative for  chest pain and palpitations.   Gastrointestinal:  Negative for abdominal pain (suprapubic pain while urinating) and constipation.   Genitourinary:  Positive for dysuria. Negative for difficulty urinating, flank pain, frequency, hematuria and urgency.   Neurological:  Negative for dizziness and light-headedness.       Objective:      Physical Exam  HENT:      Head: Normocephalic.   Pulmonary:      Effort: Pulmonary effort is normal.   Abdominal:      General: Abdomen is flat.      Palpations: Abdomen is soft.   Genitourinary:     Prostate: Not enlarged, not tender and no nodules present.   Musculoskeletal:         General: Normal range of motion.      Cervical back: Normal range of motion.   Skin:     General: Skin is warm and dry.   Neurological:      Mental Status: He is alert and oriented to person, place, and time.       Assessment:     Problem Noted   Dysuria 9/5/2024   Suprapubic Pain, Acute (Resolved) 8/15/2024       Plan:     Cysto with Dr. Hood and CT scheduled. BMP lab scheduled prior to CT scan.  Rx sent for longer course of bactrim, patient will take a total of bactrim for 4 weeks.  Urine sent for UA and culture  Follow-up pending cysto and CT results.    STEPHON Vickers spent a total of 25 minutes on the day of the visit.This includes face to face time and non-face to face time preparing to see the patient (eg, review of tests), obtaining and/or reviewing separately obtained history, documenting clinical information in the electronic or other health record, independently interpreting results and communicating results to the patient/family/caregiver, or care coordinator.

## 2024-09-07 LAB — BACTERIA UR CULT: ABNORMAL

## 2024-09-13 ENCOUNTER — HOSPITAL ENCOUNTER (OUTPATIENT)
Dept: RADIOLOGY | Facility: HOSPITAL | Age: 84
Discharge: HOME | End: 2024-09-13
Payer: MEDICARE

## 2024-09-13 DIAGNOSIS — N32.0 BLADDER-NECK OBSTRUCTION: ICD-10-CM

## 2024-09-13 PROCEDURE — 74176 CT ABD & PELVIS W/O CONTRAST: CPT | Mod: TC

## 2024-09-13 PROCEDURE — 74176 CT ABD & PELVIS W/O CONTRAST: CPT | Mod: 26,,, | Performed by: RADIOLOGY

## 2024-09-20 ENCOUNTER — OFFICE VISIT (OUTPATIENT)
Dept: UROLOGY | Facility: CLINIC | Age: 84
End: 2024-09-20
Payer: MEDICARE

## 2024-09-20 ENCOUNTER — HOSPITAL ENCOUNTER (INPATIENT)
Facility: HOSPITAL | Age: 84
LOS: 7 days | Discharge: HOME | DRG: 690 | End: 2024-09-27
Attending: STUDENT IN AN ORGANIZED HEALTH CARE EDUCATION/TRAINING PROGRAM | Admitting: INTERNAL MEDICINE
Payer: MEDICARE

## 2024-09-20 VITALS
WEIGHT: 161.25 LBS | HEART RATE: 80 BPM | BODY MASS INDEX: 23.08 KG/M2 | DIASTOLIC BLOOD PRESSURE: 75 MMHG | SYSTOLIC BLOOD PRESSURE: 130 MMHG | HEIGHT: 70 IN

## 2024-09-20 DIAGNOSIS — B96.5 PSEUDOMONAS URINARY TRACT INFECTION: ICD-10-CM

## 2024-09-20 DIAGNOSIS — N39.0 PSEUDOMONAS URINARY TRACT INFECTION: ICD-10-CM

## 2024-09-20 DIAGNOSIS — I10 PRIMARY HYPERTENSION: ICD-10-CM

## 2024-09-20 DIAGNOSIS — R30.0 DYSURIA: Primary | ICD-10-CM

## 2024-09-20 DIAGNOSIS — R30.0 DYSURIA: ICD-10-CM

## 2024-09-20 DIAGNOSIS — N32.0 BLADDER OUTLET OBSTRUCTION: ICD-10-CM

## 2024-09-20 DIAGNOSIS — D64.9 NORMOCYTIC ANEMIA: ICD-10-CM

## 2024-09-20 DIAGNOSIS — N30.00 ACUTE CYSTITIS WITHOUT HEMATURIA: Primary | ICD-10-CM

## 2024-09-20 DIAGNOSIS — N32.3 DIVERTICULA, BLADDER: ICD-10-CM

## 2024-09-20 DIAGNOSIS — N30.01 ACUTE CYSTITIS WITH HEMATURIA: ICD-10-CM

## 2024-09-20 LAB
ALBUMIN SERPL BCP-MCNC: 3.7 G/DL (ref 3.5–5.2)
ALP SERPL-CCNC: 79 U/L (ref 55–135)
ALT SERPL W/O P-5'-P-CCNC: 10 U/L (ref 10–44)
ANION GAP SERPL CALC-SCNC: 6 MMOL/L (ref 8–16)
ANISOCYTOSIS BLD QL SMEAR: SLIGHT
AST SERPL-CCNC: 16 U/L (ref 10–40)
BACTERIA #/AREA URNS HPF: ABNORMAL /HPF
BASOPHILS # BLD AUTO: 0.04 K/UL (ref 0–0.2)
BASOPHILS NFR BLD: 0.7 % (ref 0–1.9)
BILIRUB SERPL-MCNC: 0.6 MG/DL (ref 0.1–1)
BILIRUB UR QL STRIP: NEGATIVE
BUN SERPL-MCNC: 13 MG/DL (ref 8–23)
CALCIUM SERPL-MCNC: 8.9 MG/DL (ref 8.7–10.5)
CHLORIDE SERPL-SCNC: 104 MMOL/L (ref 95–110)
CLARITY UR: CLEAR
CO2 SERPL-SCNC: 25 MMOL/L (ref 23–29)
COLOR UR: COLORLESS
CREAT SERPL-MCNC: 1.2 MG/DL (ref 0.5–1.4)
DIFFERENTIAL METHOD BLD: ABNORMAL
EOSINOPHIL # BLD AUTO: 0.1 K/UL (ref 0–0.5)
EOSINOPHIL NFR BLD: 2.6 % (ref 0–8)
ERYTHROCYTE [DISTWIDTH] IN BLOOD BY AUTOMATED COUNT: 13.6 % (ref 11.5–14.5)
EST. GFR  (NO RACE VARIABLE): 59 ML/MIN/1.73 M^2
GLUCOSE SERPL-MCNC: 114 MG/DL (ref 70–110)
GLUCOSE UR QL STRIP: NEGATIVE
HCT VFR BLD AUTO: 35.6 % (ref 40–54)
HGB BLD-MCNC: 11.7 G/DL (ref 14–18)
HGB UR QL STRIP: ABNORMAL
HYALINE CASTS #/AREA URNS LPF: 1 /LPF
IMM GRANULOCYTES # BLD AUTO: 0.01 K/UL (ref 0–0.04)
IMM GRANULOCYTES NFR BLD AUTO: 0.2 % (ref 0–0.5)
KETONES UR QL STRIP: NEGATIVE
LACTATE SERPL-SCNC: 0.9 MMOL/L (ref 0.5–2.2)
LEUKOCYTE ESTERASE UR QL STRIP: ABNORMAL
LYMPHOCYTES # BLD AUTO: 1.8 K/UL (ref 1–4.8)
LYMPHOCYTES NFR BLD: 32.8 % (ref 18–48)
MCH RBC QN AUTO: 30 PG (ref 27–31)
MCHC RBC AUTO-ENTMCNC: 32.9 G/DL (ref 32–36)
MCV RBC AUTO: 91 FL (ref 82–98)
MICROSCOPIC COMMENT: ABNORMAL
MONOCYTES # BLD AUTO: 0.6 K/UL (ref 0.3–1)
MONOCYTES NFR BLD: 10.7 % (ref 4–15)
NEUTROPHILS # BLD AUTO: 2.9 K/UL (ref 1.8–7.7)
NEUTROPHILS NFR BLD: 53 % (ref 38–73)
NITRITE UR QL STRIP: NEGATIVE
NRBC BLD-RTO: 0 /100 WBC
PH UR STRIP: 6 [PH] (ref 5–8)
PLATELET # BLD AUTO: 124 K/UL (ref 150–450)
PLATELET BLD QL SMEAR: ABNORMAL
PMV BLD AUTO: 10.4 FL (ref 9.2–12.9)
POTASSIUM SERPL-SCNC: 3.9 MMOL/L (ref 3.5–5.1)
PROT SERPL-MCNC: 7.4 G/DL (ref 6–8.4)
PROT UR QL STRIP: NEGATIVE
RBC # BLD AUTO: 3.9 M/UL (ref 4.6–6.2)
RBC #/AREA URNS HPF: 2 /HPF (ref 0–4)
SODIUM SERPL-SCNC: 135 MMOL/L (ref 136–145)
SP GR UR STRIP: <1.005 (ref 1–1.03)
URN SPEC COLLECT METH UR: ABNORMAL
UROBILINOGEN UR STRIP-ACNC: NEGATIVE EU/DL
WBC # BLD AUTO: 5.4 K/UL (ref 3.9–12.7)
WBC #/AREA URNS HPF: 55 /HPF (ref 0–5)

## 2024-09-20 PROCEDURE — 85025 COMPLETE CBC W/AUTO DIFF WBC: CPT

## 2024-09-20 PROCEDURE — 87088 URINE BACTERIA CULTURE: CPT

## 2024-09-20 PROCEDURE — 81000 URINALYSIS NONAUTO W/SCOPE: CPT

## 2024-09-20 PROCEDURE — 63600175 PHARM REV CODE 636 W HCPCS

## 2024-09-20 PROCEDURE — 99999 PR PBB SHADOW E&M-EST. PATIENT-LVL III: CPT | Mod: PBBFAC,,,

## 2024-09-20 PROCEDURE — 80053 COMPREHEN METABOLIC PANEL: CPT

## 2024-09-20 PROCEDURE — 11000001 HC ACUTE MED/SURG PRIVATE ROOM

## 2024-09-20 PROCEDURE — 83605 ASSAY OF LACTIC ACID: CPT | Performed by: PHYSICIAN ASSISTANT

## 2024-09-20 PROCEDURE — 87086 URINE CULTURE/COLONY COUNT: CPT | Mod: 59

## 2024-09-20 PROCEDURE — 99285 EMERGENCY DEPT VISIT HI MDM: CPT

## 2024-09-20 PROCEDURE — 87186 SC STD MICRODIL/AGAR DIL: CPT

## 2024-09-20 PROCEDURE — 87086 URINE CULTURE/COLONY COUNT: CPT

## 2024-09-20 PROCEDURE — 25000003 PHARM REV CODE 250

## 2024-09-20 PROCEDURE — 87040 BLOOD CULTURE FOR BACTERIA: CPT | Mod: 59 | Performed by: PHYSICIAN ASSISTANT

## 2024-09-20 PROCEDURE — 81001 URINALYSIS AUTO W/SCOPE: CPT

## 2024-09-20 RX ORDER — ENOXAPARIN SODIUM 100 MG/ML
40 INJECTION SUBCUTANEOUS EVERY 24 HOURS
Status: DISCONTINUED | OUTPATIENT
Start: 2024-09-21 | End: 2024-09-27 | Stop reason: HOSPADM

## 2024-09-20 RX ORDER — LOSARTAN POTASSIUM 50 MG/1
100 TABLET ORAL DAILY
Status: DISCONTINUED | OUTPATIENT
Start: 2024-09-21 | End: 2024-09-27 | Stop reason: HOSPADM

## 2024-09-20 RX ORDER — TALC
6 POWDER (GRAM) TOPICAL NIGHTLY PRN
Status: DISCONTINUED | OUTPATIENT
Start: 2024-09-20 | End: 2024-09-27 | Stop reason: HOSPADM

## 2024-09-20 RX ORDER — SODIUM CHLORIDE 0.9 % (FLUSH) 0.9 %
10 SYRINGE (ML) INJECTION EVERY 12 HOURS PRN
Status: DISCONTINUED | OUTPATIENT
Start: 2024-09-20 | End: 2024-09-27 | Stop reason: HOSPADM

## 2024-09-20 RX ORDER — TAMSULOSIN HYDROCHLORIDE 0.4 MG/1
0.4 CAPSULE ORAL DAILY
Status: DISCONTINUED | OUTPATIENT
Start: 2024-09-21 | End: 2024-09-27 | Stop reason: HOSPADM

## 2024-09-20 RX ORDER — FINASTERIDE 5 MG/1
5 TABLET, FILM COATED ORAL DAILY
Status: DISCONTINUED | OUTPATIENT
Start: 2024-09-21 | End: 2024-09-27 | Stop reason: HOSPADM

## 2024-09-20 RX ORDER — ACETAMINOPHEN 325 MG/1
650 TABLET ORAL EVERY 4 HOURS PRN
Status: DISCONTINUED | OUTPATIENT
Start: 2024-09-20 | End: 2024-09-27 | Stop reason: HOSPADM

## 2024-09-20 RX ADMIN — CEFEPIME 2 G: 2 INJECTION, POWDER, FOR SOLUTION INTRAVENOUS at 04:09

## 2024-09-20 NOTE — H&P
[facility] Medicine History and Physical  [facility]    Admitting Team: [facility] A Attending: Dr. Lombardo   Resident: Dr. Johnson Intern: Dr. Rodrigeuz     Date of Admit: 9/20/2024    Chief Complaint and Duration     Dysuria for 1 month.    Subjective      History of Present Illness:  Magedio Padua is a 84 y.o. male with PMHx of prostate cancer, bladder outlet obstruction, HTN, HLD, and anemia who presented to [facility] on 9/20/2024 for IV Abx treatment of UTI per urologist.    The patient originally developed dysuria a month ago.  The patient went to see urologist on 8/15 due to symptoms and was prescribed 7 day course of Bactrim.  A urine cx was sent off and grew Pseudomonas.  Urology clinic tried to contact patient regarding results, but was unable to reach him.  Patient returned to clinic on 9/5 and was given Bactrim for one month course.  Repeat urine cx with Pseudomonas resistant to PO Abx.  Return visit to urology on 9/20 and was recommended to go to ED for IV Abx management of UTI.    Patient denies fever, chills, nausea, vomiting, or flank pain.  He states he still has dysuria.  He has a history of prostate cancer s/p TURP causing bladder outlet obstruction.  He is supposed to have gotten a cystoscopy on 9/18, but is unable to have it performed due to active UTI.    Review of Systems:  All other systems reviewed and negative    Past Medical History:  Prostate Cancer s/p TURP  Bladder Outlet Obstruction  HTN  HLD  Normocytic Anemia  Thrombocytopenia     Past Surgical History:  Past Surgical History:   Procedure Laterality Date    COLONOSCOPY N/A 8/4/2016    Procedure: History of melena/Colonoscopy;  Surgeon: Alejandro Borrero MD;  Location: [facility];  Service: Endoscopy;  Laterality: N/A;    CYSTOSCOPY WITH URODYNAMIC TESTING N/A 11/18/2021    Procedure: CYSTOSCOPY, WITH URODYNAMIC TESTING FLOUROSCOPIC;  Surgeon: Palmer Mendoza MD;  Location: [facility] OR [address] [place] [address];  Service:  Urology;  Laterality: N/A;  1HR    TRANSURETHRAL RESECTION OF PROSTATE N/A 2021    Procedure: TURP (TRANSURETHRAL RESECTION OF PROSTATE);  Surgeon: Jeanna Cardona MD;  Location: [facility] OR;  Service: Urology;  Laterality: N/A;       Allergies:  Review of patient's allergies indicates:  No Known Allergies    Home Medications:  Prior to Admission medications    Medication Sig Start Date End Date Taking? Authorizing Provider   COMBIGAN 0.2-0.5 % Drop  21  Yes Provider, Historical   finasteride (PROSCAR) 5 mg tablet Take 5 mg by mouth once daily. 21  Yes Provider, Historical   losartan (COZAAR) 100 MG tablet Take 100 mg by mouth. 3/16/24  Yes Provider, Historical   ofloxacin (FLOXIN) 0.3 % otic solution SMARTSI Drop(s) Right Ear Twice Daily 8/15/24  Yes Provider, Historical   tamsulosin (FLOMAX) 0.4 mg Cap Take 0.4 mg by mouth once daily. 21  Yes Provider, Historical   oxyCODONE-acetaminophen (PERCOCET) 5-325 mg per tablet Take 1 tablet by mouth every 6 (six) hours as needed for Pain.  Patient not taking: Reported on 2024   Jeanna Cardona MD   amLODIPine (NORVASC) 10 MG tablet Take 10 mg by mouth. 3/25/24 9/20/24  Provider, Historical   atorvastatin (LIPITOR) 40 MG tablet Take 40 mg by mouth. 3/25/24 9/20/24  Provider, Historical   carBAMazepine (TEGRETOL) 200 mg tablet Take 200 mg by mouth 3 (three) times daily. 3/25/24 9/20/24  Provider, Historical   lisinopriL-hydrochlorothiazide (PRINZIDE,ZESTORETIC) 20-12.5 mg per tablet Take 1 tablet by mouth every morning. 24  Provider, Historical   mupirocin (BACTROBAN) 2 % ointment  21  Provider, Historical   vardenafiL (LEVITRA) 20 MG tablet Take 1 tablet (20 mg total) by mouth as needed for Erectile Dysfunction. 22  Jeanna Cardona MD       Family History:  No family history on file.    Social History:  Tobacco: denies any smoking history    EtOH: denies alcohol use    Illicits: denies drug  "use    Occupation: none    Lives at home with friends    Health Maintaince :   Primary Care Physician: John Conway MD    Immunizations:   Currently on File within the [facility]: There is no immunization history for the selected administration types on file for this patient.  Colonoscopy 2016 without polyps     Objective     Physical Examination:    Triage: BP: (!) 153/70  Pulse: 69  Temp: 98.5 °F (36.9 °C)  Resp: 16  Height: 5' 10" (177.8 cm)  Weight: 73 kg (161 lb)  BMI (Calculated): 23.1  SpO2: 99 %  Exam: BP (!) 167/76 (BP Location: Right arm, Patient Position: Lying)   Pulse 63   Temp 97.2 °F (36.2 °C) (Oral)   Resp 18   Ht 5' 10" (1.778 m)   Wt 73 kg (161 lb)   SpO2 99%   BMI 23.10 kg/m²   Body mass index is 23.1 kg/m².     General:  resting comfortably in bed in no acute distress, conversational  Cardiovascular:  RRR, no murmurs appreciated  Respiratory:  clear to auscultation bilaterally, nonlabored breathing  Abdomen:  soft, non-tender, non-distended  :  negative CVA tenderness bilaterally  Extremities:  no pitting edema  Skin:  no rashes or ulcers noted  Neurologic:  Aox4, conversational, moving all extremities, ambulates    Laboratory:  Lab Results   Component Value Date    WBC 5.40 09/20/2024    HGB 11.7 (L) 09/20/2024    MCV 91 09/20/2024    MCH 30.0 09/20/2024    MCHC 32.9 09/20/2024    RDW 13.6 09/20/2024     (L) 09/20/2024    MPV 10.4 09/20/2024    PLTEST Decreased (A) 09/20/2024     Lab Results   Component Value Date    CREATININE 1.2 09/20/2024    BUN 13 09/20/2024     (L) 09/20/2024    K 3.9 09/20/2024     09/20/2024    CO2 25 09/20/2024       All laboratory data reviewed    Microbiology Data:  Microbiology Results (last 7 days)       Procedure Component Value Units Date/Time    Blood culture #1 **CANNOT BE ORDERED STAT** [[id number]] Collected: 09/20/24 1618    Order Status: Sent Specimen: Blood from Peripheral, Antecubital, Right Updated: 09/20/24 2121    " Blood culture #2 **CANNOT BE ORDERED STAT** [[id number]] Collected: 09/20/24 1551    Order Status: Sent Specimen: Blood from Peripheral, Forearm, Left Updated: 09/20/24 2121    Urine culture [[id number]] Collected: 09/20/24 1502    Order Status: No result Specimen: Urine Updated: 09/20/24 1519            EKG:  Results for orders placed or performed in visit on 11/30/21   EKG 12-lead    Collection Time: 11/30/21 10:47 AM    Narrative    Test Reason : R33.9,    Vent. Rate : 060 BPM     Atrial Rate : 060 BPM     P-R Int : 214 ms          QRS Dur : 084 ms      QT Int : 404 ms       P-R-T Axes : 053 070 074 degrees     QTc Int : 404 ms    Sinus rhythm with 1st degree A-V block  Septal infarct ,age undetermined  Abnormal ECG  When compared with ECG of 30-NOV-2021 10:47,  Confirmed by Judith TONEY, Pk COPELAND ([id number]) on 11/30/2021 2:55:13 PM    Referred By: DUTCH MORRISON           Confirmed By:Pk Wong MD        I have personally reviewed the above EKG    Radiology:  No orders to display        I have personally reviewed the above imaging    Assessment/Plan     Magedio Padua is a 84 y.o. male with PMHx of prostate cancer, bladder outlet obstruction, HTN, HLD, and anemia who presented to [facility] on 9/20/2024 for IV Abx treatment of UTI per urologist.  Patient is admitted to [facility] for IV treatment of Pseudomonas UTI.  Patient with Pseudomonas resistant to PO Abx.  Will start IV cefepime and plan to discharge him after the weekend with home IV Abx.     Pseudomonas UTI Resistant to PO Abx  - Urine cx from 8/15 with Pseudomonas aeurginosa.  Given Bactrim without improvement in dysuria.  - Urine cx from 9/5 with Pseudomonas aeruginosa resistant to PO Abx  - CT Renal Stone 9/13 without renal or ureter calculi  - Patient sent from urology clinic to ED for IV Abx treatment  - Afebrile, no leukocytosis, lactic acid WNL  - UA suggestive of active UTI  - No CVA tenderness bilaterally  - Started on IV cefepime  - blood  cultures taken in ED  Plan:  - Continue IV cefepime  - Follow up on repeat urine cx  - Consult CM for IV Abx on discharge    Stage 1 Prostate Cancer  Bladder Outlet Obstruction  - s/p TURP in 2021 due to bladder outlet obstruction  - pathology of TURP with prostate cancer  - not on any treatment, follows with urologist   - PSAs have been stable  - Continue home finasteride 5mg and tamsulosin 0.4mg    Bladder Diverticula with Calculi  - CT Renal Stone on 9/13 with large lateral bladder diverticula containing 2 small calculi  - No renal or ureter calculi  - Originally scheduled for cystoscopy on 9/18, unable to complete until resolve UTI  - Follow up with urologist after discharge    Hypertension  - Hypertensive on arrival   - Patient states he did not take HTN meds today  - Continue home losartan 100mg    Normocytic Anemia  - Hgb of 11.7 on admission  - baseline Hgb of 12.1  - will order anemia studies    Thrombocytopenia  - Plt of 124 on admission  - Last CBC 2 years ago with Plt of 140  - Unclear etiology, CTM    Hyperlipidemia  - Hx of hyperlipidemia with being prescribed a statin  - No lipid profile on record  - Does not take a statin currently      Diet: Regular  Code: Full  DVT ppx:  Lovenox  Dispo: Admit to [facility] for IV Abx treatment of UTI, plan to discharge home after the weekend on IV Abx       Cruzito Dear, DO  [facility] PGY-I

## 2024-09-20 NOTE — ED PROVIDER NOTES
Encounter Date: 9/20/2024       History     Chief Complaint   Patient presents with    MD referral     Patient reports to the ED complaining of dysuria. Patient was seen by MD and told to report to the ED for IV Abx due to UA results. Patient ambulatory to triage, NAD noted.     84-year-old male presents to ED by request of Urology with concern of urinary tract infection requiring IV antibiotics.  He has been treated with course of Bactrim but continues have complaints of dysuria.  Urine cultures grow Pseudomonas aeruginosa that is resistant to p.o. medications.  Seen by Urology today and asked to report to ED for IV antibiotics prior to cystoscopy with Dr. Hood.  No fevers, chills, abdominal or flank pain, nausea or vomiting, changes in urine frequency.  Denying penile or testicular pain.  No other acute complaints at this time    The history is provided by the patient. The history is limited by a language barrier. A  was used ([employer]IS[employer]ience: [id number]).     Review of patient's allergies indicates:  No Known Allergies  Past Medical History:   Diagnosis Date    Abdominal pain     High cholesterol     Suprapubic pain, acute 08/15/2024     Past Surgical History:   Procedure Laterality Date    COLONOSCOPY N/A 8/4/2016    Procedure: History of melena/Colonoscopy;  Surgeon: Alejandro Borrero MD;  Location: [facility];  Service: Endoscopy;  Laterality: N/A;    CYSTOSCOPY WITH URODYNAMIC TESTING N/A 11/18/2021    Procedure: CYSTOSCOPY, WITH URODYNAMIC TESTING FLOUROSCOPIC;  Surgeon: Palmer Mendoza MD;  Location: [address];  Service: Urology;  Laterality: N/A;  1HR    TRANSURETHRAL RESECTION OF PROSTATE N/A 12/8/2021    Procedure: TURP (TRANSURETHRAL RESECTION OF PROSTATE);  Surgeon: Jeanna Cardona MD;  Location: [facility];  Service: Urology;  Laterality: N/A;     No family history on file.  Social History     Tobacco Use    Smoking status: Never     Passive exposure: Never    Smokeless tobacco: Never    Substance Use Topics    Alcohol use: Yes     Comment: occasional beer    Drug use: Never     Review of Systems   Gastrointestinal:  Negative for abdominal pain, nausea and vomiting.   Genitourinary:  Positive for dysuria. Negative for frequency, penile pain and testicular pain.       Physical Exam     Initial Vitals [09/20/24 1430]   BP Pulse Resp Temp SpO2   (!) 153/70 69 16 98.5 °F (36.9 °C) 99 %      MAP       --         Physical Exam    Vitals reviewed.  Constitutional: Vital signs are normal. He appears well-developed and well-nourished. He is cooperative. He does not have a sickly appearance. He does not appear ill. No distress.   HENT:   Head: Normocephalic and atraumatic.   Eyes: EOM are normal.   Neck:   Normal range of motion.  Abdominal: There is no abdominal tenderness.   No right CVA tenderness.  No left CVA tenderness.   Musculoskeletal:      Cervical back: Normal range of motion.     Neurological: He is alert and oriented to person, place, and time. GCS eye subscore is 4. GCS verbal subscore is 5. GCS motor subscore is 6.   Psychiatric: He has a normal mood and affect. His speech is normal and behavior is normal.         ED Course   Procedures  Labs Reviewed   URINALYSIS, REFLEX TO URINE CULTURE - Abnormal       Result Value    Specimen UA Urine, Clean Catch      Color, UA Colorless (*)     Appearance, UA Clear      pH, UA 6.0      Specific Gravity, UA <1.005 (*)     Protein, UA Negative      Glucose, UA Negative      Ketones, UA Negative      Bilirubin (UA) Negative      Occult Blood UA 1+ (*)     Nitrite, UA Negative      Urobilinogen, UA Negative      Leukocytes, UA 3+ (*)     Narrative:     Specimen Source->Urine   CBC W/ AUTO DIFFERENTIAL - Abnormal    WBC 5.40      RBC 3.90 (*)     Hemoglobin 11.7 (*)     Hematocrit 35.6 (*)     MCV 91      MCH 30.0      MCHC 32.9      RDW 13.6      Platelets 124 (*)     MPV 10.4      Immature Granulocytes 0.2      Gran # (ANC) 2.9      Immature Grans (Abs)  0.01      Lymph # 1.8      Mono # 0.6      Eos # 0.1      Baso # 0.04      nRBC 0      Gran % 53.0      Lymph % 32.8      Mono % 10.7      Eosinophil % 2.6      Basophil % 0.7      Platelet Estimate Decreased (*)     Aniso Slight      Differential Method Automated     COMPREHENSIVE METABOLIC PANEL - Abnormal    Sodium 135 (*)     Potassium 3.9      Chloride 104      CO2 25      Glucose 114 (*)     BUN 13      Creatinine 1.2      Calcium 8.9      Total Protein 7.4      Albumin 3.7      Total Bilirubin 0.6      Alkaline Phosphatase 79      AST 16      ALT 10      eGFR 59 (*)     Anion Gap 6 (*)    URINALYSIS MICROSCOPIC - Abnormal    RBC, UA 2      WBC, UA 55 (*)     Bacteria Occasional      Hyaline Casts, UA 1      Microscopic Comment SEE COMMENT      Narrative:     Specimen Source->Urine   CULTURE, URINE   CULTURE, BLOOD   CULTURE, BLOOD   LACTIC ACID, PLASMA    Lactate (Lactic Acid) 0.9            Imaging Results    None          Medications   finasteride tablet 5 mg (has no administration in time range)   losartan tablet 100 mg (has no administration in time range)   tamsulosin 24 hr capsule 0.4 mg (has no administration in time range)   sodium chloride 0.9% flush 10 mL (has no administration in time range)   enoxaparin injection 40 mg (has no administration in time range)   acetaminophen tablet 650 mg (has no administration in time range)   melatonin tablet 6 mg (has no administration in time range)   ceFEPIme (MAXIPIME) 2 g in D5W 100 mL IVPB (MB+) (has no administration in time range)   ceFEPIme (MAXIPIME) 2 g in D5W 100 mL IVPB (MB+) (2 g Intravenous New Bag 9/20/24 1630)     Medical Decision Making  Patient presents by requested urology with concern of urinary tract infection that is resistant to p.o. antibiotics.  Afebrile arrival.  Patient in no distress on exam    DDx:  Including but not limited to UTI, dysuria    Amount and/or Complexity of Data Reviewed  External Data Reviewed: radiology.     Details: CT  renal performed on 09/13 showing large lateral bladder diverticula.  The left bladder diverticulum contains 2 small calculi.  Kidneys ureters bladder and the right bladder diverticulum no calculi seen.  Labs: ordered. Decision-making details documented in ED Course.               ED Course as of 09/20/24 1717   Fri Sep 20, 2024   1614 CBC auto differential(!)  No elevation WBC [KS]   1614 Comprehensive metabolic panel(!)  Grossly unremarkable with creatinine 1.2 [KS]   1614 Urinalysis, Reflex to Urine Culture Urine, Clean Catch(!)  3+ leukocytes with 55 WBC.  Urine sent to culture. [KS]   1615 Urine culture on 09/05 showing > 100,000 CFU Pseudomonas aeruginosa sensitive to cefepime.  Order placed for cefepime 2 g [KS]   1627 Lactic acid, plasma  WNL [KS]   1627 Blood cultures pending x2 [KS]   1627 Patient discussed with U internal medicine team, who has accepted patient for observation. [KS]      ED Course User Index  [KS] Erasmo Dobbins PA-C                             Clinical Impression:  Final diagnoses:  [N30.00] Acute cystitis without hematuria (Primary)  [R30.0] Dysuria          ED Disposition Condition    Observation                 Erasmo Dobbins PA-C  09/20/24 1629       Erasmo Dobbins PA-C  09/20/24 1717

## 2024-09-20 NOTE — Clinical Note
Diagnosis: Acute cystitis without hematuria [794670]   Future Attending Provider: CHARLES MERAZ [[id number]]   Is the patient being sent to ED Observation?: No

## 2024-09-20 NOTE — PHARMACY MED REC
"    [facility]           Pharmacy  Admission Medication History     The home medication history was taken by Karen Platt.      Medication history obtained from Medications listed below were obtained from: Patient/family    Based on information gathered for medication list, you may go to "Admission" then "Reconcile Home Medications" tabs to review and/or act upon those items.     The home medication list has been updated by the Pharmacy department.   Please read ALL comments highlighted in yellow.   Please address this information as you see fit.    Feel free to contact us if you have any questions or require assistance.    The medications listed below were removed from the home medication list.  Please reorder if appropriate:    Patient reports NOT TAKING the following medication(s):  Amlodipine 10 mg  Lipitor 40 mg  Tegretol 200 mg  Lisinopril-hctz 20-12.5 mg  Percocet 5-325 mg    No current facility-administered medications on file prior to encounter.     Current Outpatient Medications on File Prior to Encounter   Medication Sig Dispense Refill    COMBIGAN 0.2-0.5 % Drop       finasteride (PROSCAR) 5 mg tablet Take 5 mg by mouth once daily.      losartan (COZAAR) 100 MG tablet Take 100 mg by mouth.      ofloxacin (FLOXIN) 0.3 % otic solution SMARTSI Drop(s) Right Ear Twice Daily      tamsulosin (FLOMAX) 0.4 mg Cap Take 0.4 mg by mouth once daily.         Please address this information as you see fit.  Feel free to contact us if you have any questions or require assistance.    Karen Platt  [phone]             .          "

## 2024-09-20 NOTE — PROGRESS NOTES
Subjective:       Patient ID: Magedio Padua is a 84 y.o. male.    Chief Complaint: painful urination     This is a 84 y.o.  male patient that is new to me.  This is a past patient of Dr. Cardona who was last seen in 2021.  Urologic history consists of:  2021- TURP with Dr. Cardona due to bladder outlet obstruction after cysto and UDS.  Pathology from TURP showed-- Gl 3+4 prostate cancer in 7% of chips.   PSAs have been stable  PVRs with Dr. Cardona have been anywhere from 200s-300s.  Today patient reports pain with urination that started about two weeks ago. Reports that he has suprapubic pain that is described as aching and only occurs when he is urinating. Denies dysuria, hematuria, fevers, chills, flank pain, urinary frequency or urgency, weak stream.   Reports that he is taking flomax and proscar daily in the morning.  Reports that he drinks plenty of water-- 7-8 16oz bottles of water and coffee every morning.   PVR 16ml immediately after urinating in clinic  Urine culture 8 and 9/2024--  positive for pseudomonias aerginosa >100,000, patient was started on 7 day course of bactrim  9/5/24-- Patient here today for f/u. Reports that he finshed bactrim. Reports that he is still having dysuria. Reports that the suprapubic pain has resolved. Denies fever, pain, gross hematuria.  PVR 23ml immediately after urinating in clinic  Urine dipstick- positive for Leukocytes, RBC, and negative for Nitrites.       NCCT 9/2024: bilateral large bladder diverticulae, small stones on left diverticulum, prostate volume 40 gm.  Diverticulae noted at time of UDS in 2021.    Cystoscopy 9/18/24: not completed, patient needs IV antibiotics to treat infection properly.  9/20/24-- Patient is here today with c/o dysuria. Denies frequency, urgency, weak stream, fevers, chills, pain.     LAST PSA  Lab Results   Component Value Date    PSA 1.2 02/17/2022    PSADIAG 0.93 08/26/2022       Lab Results   Component Value Date    CREATININE 1.1 09/05/2024        ---  PMH/PSH/Medications/Allergies/Social history reviewed and as in chart.    Review of Systems   Constitutional:  Negative for activity change, chills and fever.   Respiratory:  Negative for shortness of breath.    Cardiovascular:  Negative for chest pain and palpitations.   Gastrointestinal:  Negative for abdominal pain and constipation.   Genitourinary:  Positive for dysuria. Negative for difficulty urinating, flank pain, frequency, hematuria and urgency.   Neurological:  Negative for dizziness and light-headedness.       Objective:      Physical Exam  HENT:      Head: Normocephalic.   Pulmonary:      Effort: Pulmonary effort is normal.   Abdominal:      General: Abdomen is flat.      Palpations: Abdomen is soft.   Genitourinary:     Prostate: Not enlarged, not tender and no nodules present.   Musculoskeletal:         General: Normal range of motion.      Cervical back: Normal range of motion.   Skin:     General: Skin is warm and dry.   Neurological:      Mental Status: He is alert and oriented to person, place, and time.       Assessment:     Problem Noted   Dysuria 9/5/2024   Suprapubic Pain, Acute (Resolved) 8/15/2024       Plan:     Reviewed CT results. Discussed with patient that urine culture results are only sensitive to IV antibiotics. Explained to him that this infection in his urine needs to be treated prior to completing cysto with Dr. Hood. Advised patient to go to the ED after leaving clinic due to continued LUTS. Patient voiced understanding.  Urine sent for UA micro and urine culture.  Called [place] ED and gave report to start IV antibiotics based on most recent urine culture results.   Follow-up after discharge from hospital.    STEHPON Vickers    I spent a total of 20 minutes on the day of the visit.This includes face to face time and non-face to face time preparing to see the patient (eg, review of tests), obtaining and/or reviewing separately obtained history, documenting clinical  information in the electronic or other health record, independently interpreting results and communicating results to the patient/family/caregiver, or care coordinator.

## 2024-09-21 PROBLEM — I10 PRIMARY HYPERTENSION: Status: ACTIVE | Noted: 2024-09-21

## 2024-09-21 PROBLEM — N39.0 PSEUDOMONAS URINARY TRACT INFECTION: Status: ACTIVE | Noted: 2024-09-21

## 2024-09-21 PROBLEM — D64.9 NORMOCYTIC ANEMIA: Status: ACTIVE | Noted: 2024-09-21

## 2024-09-21 PROBLEM — B96.5 PSEUDOMONAS URINARY TRACT INFECTION: Status: ACTIVE | Noted: 2024-09-21

## 2024-09-21 PROBLEM — N32.0 BLADDER OUTLET OBSTRUCTION: Status: ACTIVE | Noted: 2024-09-21

## 2024-09-21 LAB
ALBUMIN SERPL BCP-MCNC: 3.4 G/DL (ref 3.5–5.2)
ALP SERPL-CCNC: 77 U/L (ref 55–135)
ALT SERPL W/O P-5'-P-CCNC: 11 U/L (ref 10–44)
ANION GAP SERPL CALC-SCNC: 5 MMOL/L (ref 8–16)
AST SERPL-CCNC: 16 U/L (ref 10–40)
BACTERIA #/AREA URNS AUTO: ABNORMAL /HPF
BACTERIA UR CULT: ABNORMAL
BASOPHILS # BLD AUTO: 0.03 K/UL (ref 0–0.2)
BASOPHILS NFR BLD: 0.7 % (ref 0–1.9)
BILIRUB SERPL-MCNC: 0.5 MG/DL (ref 0.1–1)
BUN SERPL-MCNC: 13 MG/DL (ref 8–23)
CALCIUM SERPL-MCNC: 8.8 MG/DL (ref 8.7–10.5)
CHLORIDE SERPL-SCNC: 108 MMOL/L (ref 95–110)
CO2 SERPL-SCNC: 26 MMOL/L (ref 23–29)
CREAT SERPL-MCNC: 1 MG/DL (ref 0.5–1.4)
DIFFERENTIAL METHOD BLD: ABNORMAL
EOSINOPHIL # BLD AUTO: 0.2 K/UL (ref 0–0.5)
EOSINOPHIL NFR BLD: 3.7 % (ref 0–8)
ERYTHROCYTE [DISTWIDTH] IN BLOOD BY AUTOMATED COUNT: 13.4 % (ref 11.5–14.5)
EST. GFR  (NO RACE VARIABLE): >60 ML/MIN/1.73 M^2
GLUCOSE SERPL-MCNC: 112 MG/DL (ref 70–110)
HCT VFR BLD AUTO: 36.7 % (ref 40–54)
HGB BLD-MCNC: 12.1 G/DL (ref 14–18)
IMM GRANULOCYTES # BLD AUTO: 0.01 K/UL (ref 0–0.04)
IMM GRANULOCYTES NFR BLD AUTO: 0.2 % (ref 0–0.5)
LYMPHOCYTES # BLD AUTO: 1.7 K/UL (ref 1–4.8)
LYMPHOCYTES NFR BLD: 41.6 % (ref 18–48)
MAGNESIUM SERPL-MCNC: 1.9 MG/DL (ref 1.6–2.6)
MCH RBC QN AUTO: 30 PG (ref 27–31)
MCHC RBC AUTO-ENTMCNC: 33 G/DL (ref 32–36)
MCV RBC AUTO: 91 FL (ref 82–98)
MICROSCOPIC COMMENT: ABNORMAL
MONOCYTES # BLD AUTO: 0.6 K/UL (ref 0.3–1)
MONOCYTES NFR BLD: 13.9 % (ref 4–15)
NEUTROPHILS # BLD AUTO: 1.6 K/UL (ref 1.8–7.7)
NEUTROPHILS NFR BLD: 39.9 % (ref 38–73)
NRBC BLD-RTO: 0 /100 WBC
PHOSPHATE SERPL-MCNC: 3.7 MG/DL (ref 2.7–4.5)
PLATELET # BLD AUTO: 132 K/UL (ref 150–450)
PMV BLD AUTO: 10.7 FL (ref 9.2–12.9)
POTASSIUM SERPL-SCNC: 3.9 MMOL/L (ref 3.5–5.1)
PROT SERPL-MCNC: 6.8 G/DL (ref 6–8.4)
RBC # BLD AUTO: 4.03 M/UL (ref 4.6–6.2)
RBC #/AREA URNS AUTO: 5 /HPF (ref 0–4)
SODIUM SERPL-SCNC: 139 MMOL/L (ref 136–145)
WBC # BLD AUTO: 4.09 K/UL (ref 3.9–12.7)
WBC #/AREA URNS AUTO: >100 /HPF (ref 0–5)
WBC CLUMPS UR QL AUTO: ABNORMAL

## 2024-09-21 PROCEDURE — 36415 COLL VENOUS BLD VENIPUNCTURE: CPT | Performed by: STUDENT IN AN ORGANIZED HEALTH CARE EDUCATION/TRAINING PROGRAM

## 2024-09-21 PROCEDURE — 63600175 PHARM REV CODE 636 W HCPCS: Performed by: STUDENT IN AN ORGANIZED HEALTH CARE EDUCATION/TRAINING PROGRAM

## 2024-09-21 PROCEDURE — 11000001 HC ACUTE MED/SURG PRIVATE ROOM

## 2024-09-21 PROCEDURE — 80053 COMPREHEN METABOLIC PANEL: CPT | Performed by: STUDENT IN AN ORGANIZED HEALTH CARE EDUCATION/TRAINING PROGRAM

## 2024-09-21 PROCEDURE — 85025 COMPLETE CBC W/AUTO DIFF WBC: CPT | Performed by: STUDENT IN AN ORGANIZED HEALTH CARE EDUCATION/TRAINING PROGRAM

## 2024-09-21 PROCEDURE — 25000003 PHARM REV CODE 250: Performed by: STUDENT IN AN ORGANIZED HEALTH CARE EDUCATION/TRAINING PROGRAM

## 2024-09-21 PROCEDURE — 84100 ASSAY OF PHOSPHORUS: CPT | Performed by: STUDENT IN AN ORGANIZED HEALTH CARE EDUCATION/TRAINING PROGRAM

## 2024-09-21 PROCEDURE — 83735 ASSAY OF MAGNESIUM: CPT | Performed by: STUDENT IN AN ORGANIZED HEALTH CARE EDUCATION/TRAINING PROGRAM

## 2024-09-21 RX ADMIN — FINASTERIDE 5 MG: 5 TABLET, FILM COATED ORAL at 09:09

## 2024-09-21 RX ADMIN — TAMSULOSIN HYDROCHLORIDE 0.4 MG: 0.4 CAPSULE ORAL at 09:09

## 2024-09-21 RX ADMIN — CEFEPIME 2 G: 2 INJECTION, POWDER, FOR SOLUTION INTRAVENOUS at 05:09

## 2024-09-21 RX ADMIN — CEFEPIME 2 G: 2 INJECTION, POWDER, FOR SOLUTION INTRAVENOUS at 04:09

## 2024-09-21 RX ADMIN — LOSARTAN POTASSIUM 100 MG: 50 TABLET, FILM COATED ORAL at 09:09

## 2024-09-21 RX ADMIN — ENOXAPARIN SODIUM 40 MG: 40 INJECTION SUBCUTANEOUS at 04:09

## 2024-09-21 NOTE — PROGRESS NOTES
"[facility] Hospital Medicine Progress Note    Primary Team: [facility] Hospitalist Team A  Attending Physician: Dot Lombardo MD  Resident: Dr. Johnson  Intern: Dr. Rodriguez    Date of Admit: 2024     Chief Complaint:   Chief Complaint   Patient presents with    MD referral     Patient reports to the ED complaining of dysuria. Patient was seen by MD and told to report to the ED for IV Abx due to UA results. Patient ambulatory to triage, NAD noted.       Subjective/Interval Events:      No acute events overnight. Patient resting comfortably in bed this morning. Patient has no pain or other other complaints.  He understands the plan for IV Abx over the weekend.    Objective    Objective   Last 24 Hour Vital Signs:  BP  Min: 130/75  Max: 176/74  Temp  Av.6 °F (36.4 °C)  Min: 96.4 °F (35.8 °C)  Max: 98.5 °F (36.9 °C)  Pulse  Av.8  Min: 55  Max: 80  Resp  Av.2  Min: 16  Max: 18  SpO2  Av.4 %  Min: 98 %  Max: 99 %  Height  Av' 10" (177.8 cm)  Min: 5' 10" (177.8 cm)  Max: 5' 10" (177.8 cm)  Weight  Av.4 kg (159 lb 10.7 oz)  Min: 71.1 kg (156 lb 12 oz)  Max: 73.1 kg (161 lb 4.3 oz)    Intake/Output Summary (Last 24 hours) at 2024 0651  Last data filed at 2024 0530  Gross per 24 hour   Intake 186.8 ml   Output 525 ml   Net -338.2 ml       Physical Examination:  General:  resting comfortably in bed in no acute distress, conversational  Cardiovascular:  RRR, no murmurs appreciated  Respiratory:  clear to auscultation bilaterally, nonlabored breathing  Abdomen:  soft, non-tender, non-distended  :  negative CVA tenderness bilaterally  Extremities:  no pitting edema  Skin:  no rashes or ulcers noted  Neurologic:  Aox4, conversational, moving all extremities, ambulates    Laboratory:  Recent Labs   Lab 24  1459 24  0608   WBC 5.40 4.09   HGB 11.7* 12.1*   HCT 35.6* 36.7*   * 132*   MCV 91 91   RDW 13.6 13.4   *  --    K 3.9  --      --    CO2 25  --    BUN 13  --  "   CREATININE 1.2  --    *  --    PROT 7.4  --    ALBUMIN 3.7  --    BILITOT 0.6  --    AST 16  --    ALKPHOS 79  --    ALT 10  --        Microbiology:  Microbiology Results (last 7 days)       Procedure Component Value Units Date/Time    Blood culture #1 **CANNOT BE ORDERED STAT** [[id number]] Collected: 09/20/24 1618    Order Status: Sent Specimen: Blood from Peripheral, Antecubital, Right Updated: 09/20/24 2121    Blood culture #2 **CANNOT BE ORDERED STAT** [[id number]] Collected: 09/20/24 1551    Order Status: Sent Specimen: Blood from Peripheral, Forearm, Left Updated: 09/20/24 2121    Urine culture [[id number]] Collected: 09/20/24 1502    Order Status: No result Specimen: Urine Updated: 09/20/24 1519             Imaging:  No orders to display        Current Medications:     Scheduled:   ceFEPime IV (PEDS and ADULTS)  2 g Intravenous Q12H    enoxparin  40 mg Subcutaneous Daily    finasteride  5 mg Oral Daily    losartan  100 mg Oral Daily    tamsulosin  0.4 mg Oral Daily         Infusions:       PRN:    Current Facility-Administered Medications:     acetaminophen, 650 mg, Oral, Q4H PRN    influenza (adjuvanted), 0.5 mL, Intramuscular, Prior to discharge    melatonin, 6 mg, Oral, Nightly PRN    sodium chloride 0.9%, 10 mL, Intravenous, Q12H PRN       Assessment:     Magedio Padua is a 84 y.o. male with PMHx of prostate cancer, bladder outlet obstruction, HTN, HLD, and anemia who presented to [facility] on 9/20/2024 for IV Abx treatment of UTI per urologist.  Patient is admitted to [facility] for IV treatment of Pseudomonas UTI.  Patient with Pseudomonas resistant to PO Abx.  Will start IV cefepime and plan to discharge him after the weekend with IV Abx with home health.     Plan:     Pseudomonas UTI Resistant to PO Abx  - Urine cx from 8/15 with Pseudomonas aeurginosa.  Given Bactrim without improvement in dysuria.  - Urine cx from 9/5 with Pseudomonas aeruginosa resistant to PO Abx  - CT Renal Stone  9/13 without renal or ureter calculi  - Patient sent from urology clinic to ED for IV Abx treatment  - Afebrile, no leukocytosis, lactic acid WNL.  No systemic sx.  - UA suggestive of active UTI  - No CVA tenderness bilaterally  - Started on IV cefepime  - blood cultures NGTD  Plan:  - Continue IV cefepime. Plan for 7 day course.  - Follow up on repeat urine cx  - Consult CM for IV Abx with HH on discharge     Stage 1 Prostate Cancer  Bladder Outlet Obstruction  - s/p TURP in 2021 due to bladder outlet obstruction  - pathology of TURP with prostate cancer  - not on any treatment, follows with urologist   - PSAs have been stable  - Continue home finasteride 5mg and tamsulosin 0.4mg     Bladder Diverticula with Calculi  - CT Renal Stone on 9/13 with large lateral bladder diverticula containing 2 small calculi  - No renal or ureter calculi  - Originally scheduled for cystoscopy on 9/18, unable to complete until resolve UTI  - Follow up with urologist after discharge     Hypertension  - Hypertensive on arrival   - Patient states he did not take HTN meds today  - Continue home losartan 100mg     Normocytic Anemia  - Hgb of 11.7 on admission  - baseline Hgb of 12.1  - ordered anemia studies     Thrombocytopenia  - Plt of 124 on admission  - Last CBC 2 years ago with Plt of 140  - Unclear etiology, CTM     Hyperlipidemia  - Hx of hyperlipidemia with being prescribed a statin  - No lipid profile on record  - Does not take a statin currently        Diet: Regular  Code: Full  DVT ppx:  Lovenox  Dispo: IV Abx treatment of UTI, plan to discharge home after the weekend on IV Abx with PAWEL East Dear, DO  [facility] Internal Medicine PGY-I    [facility] Medicine Hospitalist Pager numbers:   [facility] Hospitalist Medicine Team A (Berto/Grupo): [phone]  [facility] Hospitalist Medicine Team B (Yuridia/Ronni):  [phone]

## 2024-09-21 NOTE — PLAN OF CARE
Problem: Adult Inpatient Plan of Care  Goal: Plan of Care Review  Outcome: Progressing  Goal: Absence of Hospital-Acquired Illness or Injury  Outcome: Progressing     Problem: Fall Injury Risk  Goal: Absence of Fall and Fall-Related Injury  Outcome: Progressing     Problem: UTI (Urinary Tract Infection)  Goal: Improved Infection Symptoms  Outcome: Progressing     Problem: Comorbidity Management  Goal: Blood Pressure in Desired Range  Outcome: Progressing

## 2024-09-21 NOTE — PLAN OF CARE
1405  CM was informed by Dr Cavazos (U B) that the pt will need 7 days IV cefepime following discharge.     1430  Order for midline placement noted. [facility]. Awaiting response.     CM requested  orders for IV abx info including end date. Awaiting response.     1435  CM was informed by nurse Pham that the pt's midline has not been placed yet.     [place] [facility]  Initial Discharge Assessment       Primary Care Provider: John Conway MD    Admission Diagnosis: Dysuria [R30.0]  Acute cystitis without hematuria [N30.00]    Admission Date: 9/20/2024  Expected Discharge Date: 9/22/2024    Consult: urol    Payor: [employer] MEDICARE / Plan: [employer] PPO SPECIAL NEEDS / Product Type: Medicare Advantage /     Extended Emergency Contact Information  Primary Emergency Contact: Mario Garner  Mobile Phone: [phone]  Relation: Friend  Secondary Emergency Contact: DavidchristopherDav  Mobile Phone: [phone]  Relation: Friend    Discharge Plan A: (P) Home  Discharge Plan B: (P) Home Health      [id number] [place] - [address]  [address] [place] [address]  Phone: [phone] Fax: [phone]    [facility] - [place] - [address]  [address]  [place] [address]  Phone: [phone] Fax: [phone]    [facility]  [address] [facility] [address]  [place] [address]  Phone: [phone] Fax: [phone]      Initial Assessment (most recent)       Adult Discharge Assessment - 09/21/24 1445          Discharge Assessment    Assessment Type Discharge Planning Assessment     Confirmed/corrected address, phone number and insurance Yes     Source of Information patient     Communicated ADWOA with patient/caregiver Yes     People in Home alone   pt rents a room in a house    Do you expect to return to your current living situation? Yes     Do you have help at home or someone to help you manage  your care at home? No     Prior to hospitilization cognitive status: Alert/Oriented     Current cognitive status: Alert/Oriented     Equipment Currently Used at Home none     Readmission within 30 days? No     Patient currently being followed by outpatient case management? No     Do you currently have service(s) that help you manage your care at home? No     Do you take prescription medications? Yes     Do you have prescription coverage? Yes     Do you have any problems affording any of your prescribed medications? No     Is the patient taking medications as prescribed? yes     How do you get to doctors appointments? family or friend will provide;car, drives self     Are you on dialysis? No (P)      Do you take coumadin? No (P)      Discharge Plan A Home (P)      Discharge Plan B Home Health (P)      DME Needed Upon Discharge  other (see comments) (P)    IV abx, teaching, & supplies    Discharge Plan discussed with: Patient (P)                    1445  Patient resting quietly in bed when CM rounded via [employer]. No family present. Patient was admitted with pseudomonas UTI & is being followed by urol.    Patient lives alone in a room he rents, is independent of all ADLs, & denied the need for assistance with transportation at time of discharge. Pt very eager to discharge home today. CM informed the pt of midline that needs to be placed, of referral sent to [facility], & of teaching needed prior to dc. Pt stated that he does not have anyone to assist with administering the IV abx & has not received IV abx at home in the past.     Message sent to the schedulers requesting a hospital follow up appointment with Dr John Conway (PCP) & NP Ashwini Johnson (urol). Patient will be notified of appt dates & times. Information added to the pt's discharge paperwork.    1445  CM was informed by Maisha with [facility] that the referral was received, ins is being verified, & rep will be at the bedside tomorrow  for teaching.     1500  HH orders sent to [facility] via [facility]. Awaiting response.     1600  CM discuss with the pt importance of having the midline placed, IV abx, & IV abx administration teaching prior to dc. Pt verbalized understanding & agreement to remain hospitalized.       Will continue to follow.

## 2024-09-21 NOTE — PROGRESS NOTES
09/20/24 2052   Admission   Initial VN Admission Questions Complete   Shift   Virtual Nurse - Patient Verbalized Approval Of Camera Use;VN Rounding   Safety/Activity   Patient Rounds bed in low position;call light in patient/parent reach;clutter free environment maintained;visualized patient;placement of personal items at bedside   Safety Promotion/Fall Prevention assistive device/personal item within reach;Fall Risk reviewed with patient/family;side rails raised x 2   Positioning   Body Position supine   Head of Bed (HOB) Positioning HOB at 30-45 degrees     VN cued in to pt's room with permission. Admission questions completed. Plan of care reviewed with pt. Pt denies any needs at this time. Call bell w/in reach. Instructed to call for needs/assist oob.

## 2024-09-21 NOTE — PLAN OF CARE
Problem: Adult Inpatient Plan of Care  Goal: Absence of Hospital-Acquired Illness or Injury  Outcome: Progressing     Problem: Fall Injury Risk  Goal: Absence of Fall and Fall-Related Injury  Outcome: Progressing       POC reviewed with pt, following- VSS, NADN, pt resting quietly this shift. IV ABX per order. No falls or injuries noted, CB in reach at all times. Instructed to call for needs not met on rounds, v/u.

## 2024-09-21 NOTE — PROGRESS NOTES
[facility]      HOME HEALTH ORDERS  FACE TO FACE ENCOUNTER    Patient Name: Magedio Padua  YOB: 1940    PCP: John Conway MD   PCP Address: [address] [place] [address] / [place] [address]  PCP Phone Number: [phone]  PCP Fax: [phone]    Encounter Date: 9/20/24    Admit to Home Health    Diagnoses:  Active Hospital Problems    Diagnosis  POA    *Pseudomonas urinary tract infection [N39.0, B96.5]  Yes    Bladder outlet obstruction [N32.0]  Yes    Primary hypertension [I10]  Yes    Normocytic anemia [D64.9]  Yes    Acute cystitis [N30.00]  Yes    Chronic anemia [D64.9]  Yes    Diverticula, bladder [N32.3]  Yes      Resolved Hospital Problems   No resolved problems to display.       Follow Up Appointments:  No future appointments.    Allergies:Review of patient's allergies indicates:  No Known Allergies    Medications: Review discharge medications with patient and family and provide education.    Current Facility-Administered Medications   Medication Dose Route Frequency Provider Last Rate Last Admin    acetaminophen tablet 650 mg  650 mg Oral Q4H PRN Keisha Johnson DO        ceFEPIme (MAXIPIME) 2 g in D5W 100 mL IVPB (MB+)  2 g Intravenous Q12H Keisha Johnson  mL/hr at 09/21/24 0530 Rate Verify at 09/21/24 0530    enoxaparin injection 40 mg  40 mg Subcutaneous Daily Keisha Johnson,         finasteride tablet 5 mg  5 mg Oral Daily Keisha Johnson, DO   5 mg at 09/21/24 0903    influenza (adjuvanted) (Fluad) 45 mcg/0.5 mL IM vaccine (> or = 64 yo) 0.5 mL  0.5 mL Intramuscular Prior to discharge Dot Lombardo MD        losartan tablet 100 mg  100 mg Oral Daily Keisha Johnson DO   100 mg at 09/21/24 0903    melatonin tablet 6 mg  6 mg Oral Nightly PRN Keisha Johnson DO        sodium chloride 0.9% flush 10 mL  10 mL Intravenous Q12H PRN Keisha Johnson,         tamsulosin 24 hr capsule 0.4 mg  0.4 mg Oral Daily Keisha Johnson,    0.4 mg at 09/21/24 0903        Medication  List        ASK your doctor about these medications      COMBIGAN 0.2-0.5 % Drop  Generic drug: brimonidine-timoloL     finasteride 5 mg tablet  Commonly known as: PROSCAR  Take 5 mg by mouth once daily.     losartan 100 MG tablet  Commonly known as: COZAAR  Take 100 mg by mouth.     ofloxacin 0.3 % otic solution  Commonly known as: FLOXIN  SMARTSI Drop(s) Right Ear Twice Daily     oxyCODONE-acetaminophen 5-325 mg per tablet  Commonly known as: PERCOCET  Take 1 tablet by mouth every 6 (six) hours as needed for Pain.     tamsulosin 0.4 mg Cap  Commonly known as: FLOMAX  Take 0.4 mg by mouth once daily.                I have seen and examined this patient within the last 30 days. My clinical findings that support the need for the home health skilled services and home bound status are the following:no   Medical restrictions requiring assistance of another human to leave home due to  IV infusion Needs.     Diet:   regular diet    Labs:  Report Lab results to PCP.    Referrals/ Consults  Aide to provide assistance with personal care, ADLs, and vital signs.    Activities:   activity as tolerated    Nursing:   Agency to admit patient within 24 hours of hospital discharge unless specified on physician order or at patient request    SN to complete comprehensive assessment including routine vital signs. Instruct on disease process and s/s of complications to report to MD. Review/verify medication list sent home with the patient at time of discharge  and instruct patient/caregiver as needed. Frequency may be adjusted depending on start of care date.     Skilled nurse to perform up to 3 visits PRN for symptoms related to diagnosis    Notify MD if SBP > 160 or < 90; DBP > 90 or < 50; HR > 120 or < 50; Temp > 101; O2 < 88%    Ok to schedule additional visits based on staff availability and patient request on consecutive days within the home health episode.    When multiple disciplines ordered:    Start of Care occurs on   - Wednesday schedule remaining discipline evaluations as ordered on separate consecutive days following the start of care.    Thursday SOC -schedule subsequent evaluations Friday and Monday the following week.     Friday - Saturday SOC - schedule subsequent discipline evaluations on consecutive days starting Monday of the following week.    For all post-discharge communication and subsequent orders please contact patient's primary care physician.   Miscellaneous   Home Infusion Therapy:   SN to perform Infusion Therapy/Central Line Care.  Review Central Line Care & Central Line Flush with patient.    Administer (drug and dose): Cefepime 2g [id number] for 7 days (end date: 9/28/24)   Last dose given: 0530            Home dose due: 1600    Scrub the Hub: Prior to accessing the line, always perform a 30 second alcohol scrub  Each lumen of the central line is to be flushed at least daily with 10 mL Normal Saline and 3 mL Heparin flush (10 units/mL)  Skilled Nurse (SN) may draw blood from IV access  Blood Draw Procedure:   - Aspirate at least 5 mL of blood   - Discard   - Obtain specimen   - Change injection cap   - Flush with 20 mL Normal Saline followed by a                 3-5 mL Heparin flush (10 units/mL)  Central :   - Sterile dressing changes are done weekly and as needed.   - Use chlor-hexadine scrub to cleanse site, apply Biopatch to insertion site,       apply securement device dressing   - Injection caps are changed weekly and after EVERY lab draw.   - If sterile gauze is under dressing to control oozing,                 dressing change must be performed every 24 hours until gauze is not needed.    Home Health Aide:  Nursing Three times weekly    Wound Care Orders  no    I certify that this patient is confined to his home and needs intermittent skilled nursing care.

## 2024-09-22 LAB
ALBUMIN SERPL BCP-MCNC: 3.5 G/DL (ref 3.5–5.2)
ALP SERPL-CCNC: 75 U/L (ref 55–135)
ALT SERPL W/O P-5'-P-CCNC: 9 U/L (ref 10–44)
ANION GAP SERPL CALC-SCNC: 7 MMOL/L (ref 8–16)
AST SERPL-CCNC: 17 U/L (ref 10–40)
BASOPHILS # BLD AUTO: 0.05 K/UL (ref 0–0.2)
BASOPHILS NFR BLD: 1.1 % (ref 0–1.9)
BILIRUB SERPL-MCNC: 0.5 MG/DL (ref 0.1–1)
BUN SERPL-MCNC: 15 MG/DL (ref 8–23)
CALCIUM SERPL-MCNC: 8.9 MG/DL (ref 8.7–10.5)
CHLORIDE SERPL-SCNC: 109 MMOL/L (ref 95–110)
CO2 SERPL-SCNC: 23 MMOL/L (ref 23–29)
CREAT SERPL-MCNC: 1 MG/DL (ref 0.5–1.4)
DIFFERENTIAL METHOD BLD: ABNORMAL
EOSINOPHIL # BLD AUTO: 0.2 K/UL (ref 0–0.5)
EOSINOPHIL NFR BLD: 3.4 % (ref 0–8)
ERYTHROCYTE [DISTWIDTH] IN BLOOD BY AUTOMATED COUNT: 13.4 % (ref 11.5–14.5)
EST. GFR  (NO RACE VARIABLE): >60 ML/MIN/1.73 M^2
FERRITIN SERPL-MCNC: 45 NG/ML (ref 20–300)
FOLATE SERPL-MCNC: 7.6 NG/ML (ref 4–24)
GLUCOSE SERPL-MCNC: 113 MG/DL (ref 70–110)
HCT VFR BLD AUTO: 38.6 % (ref 40–54)
HGB BLD-MCNC: 12.7 G/DL (ref 14–18)
IMM GRANULOCYTES # BLD AUTO: 0.01 K/UL (ref 0–0.04)
IMM GRANULOCYTES NFR BLD AUTO: 0.2 % (ref 0–0.5)
IRON SERPL-MCNC: 57 UG/DL (ref 45–160)
LYMPHOCYTES # BLD AUTO: 1.9 K/UL (ref 1–4.8)
LYMPHOCYTES NFR BLD: 40 % (ref 18–48)
MAGNESIUM SERPL-MCNC: 2 MG/DL (ref 1.6–2.6)
MCH RBC QN AUTO: 29.9 PG (ref 27–31)
MCHC RBC AUTO-ENTMCNC: 32.9 G/DL (ref 32–36)
MCV RBC AUTO: 91 FL (ref 82–98)
MONOCYTES # BLD AUTO: 0.6 K/UL (ref 0.3–1)
MONOCYTES NFR BLD: 12.9 % (ref 4–15)
NEUTROPHILS # BLD AUTO: 2 K/UL (ref 1.8–7.7)
NEUTROPHILS NFR BLD: 42.4 % (ref 38–73)
NRBC BLD-RTO: 0 /100 WBC
PHOSPHATE SERPL-MCNC: 3.3 MG/DL (ref 2.7–4.5)
PLATELET # BLD AUTO: 121 K/UL (ref 150–450)
PMV BLD AUTO: 11 FL (ref 9.2–12.9)
POTASSIUM SERPL-SCNC: 4.3 MMOL/L (ref 3.5–5.1)
PROT SERPL-MCNC: 7 G/DL (ref 6–8.4)
RBC # BLD AUTO: 4.25 M/UL (ref 4.6–6.2)
SATURATED IRON: 17 % (ref 20–50)
SODIUM SERPL-SCNC: 139 MMOL/L (ref 136–145)
TOTAL IRON BINDING CAPACITY: 330 UG/DL (ref 250–450)
TRANSFERRIN SERPL-MCNC: 223 MG/DL (ref 200–375)
VIT B12 SERPL-MCNC: 302 PG/ML (ref 210–950)
WBC # BLD AUTO: 4.65 K/UL (ref 3.9–12.7)

## 2024-09-22 PROCEDURE — 63600175 PHARM REV CODE 636 W HCPCS: Performed by: STUDENT IN AN ORGANIZED HEALTH CARE EDUCATION/TRAINING PROGRAM

## 2024-09-22 PROCEDURE — 85025 COMPLETE CBC W/AUTO DIFF WBC: CPT | Performed by: STUDENT IN AN ORGANIZED HEALTH CARE EDUCATION/TRAINING PROGRAM

## 2024-09-22 PROCEDURE — 25000003 PHARM REV CODE 250: Performed by: STUDENT IN AN ORGANIZED HEALTH CARE EDUCATION/TRAINING PROGRAM

## 2024-09-22 PROCEDURE — 82728 ASSAY OF FERRITIN: CPT | Performed by: STUDENT IN AN ORGANIZED HEALTH CARE EDUCATION/TRAINING PROGRAM

## 2024-09-22 PROCEDURE — 82746 ASSAY OF FOLIC ACID SERUM: CPT | Performed by: STUDENT IN AN ORGANIZED HEALTH CARE EDUCATION/TRAINING PROGRAM

## 2024-09-22 PROCEDURE — C1751 CATH, INF, PER/CENT/MIDLINE: HCPCS

## 2024-09-22 PROCEDURE — 36415 COLL VENOUS BLD VENIPUNCTURE: CPT | Performed by: STUDENT IN AN ORGANIZED HEALTH CARE EDUCATION/TRAINING PROGRAM

## 2024-09-22 PROCEDURE — 25000003 PHARM REV CODE 250: Performed by: INTERNAL MEDICINE

## 2024-09-22 PROCEDURE — 84100 ASSAY OF PHOSPHORUS: CPT | Performed by: STUDENT IN AN ORGANIZED HEALTH CARE EDUCATION/TRAINING PROGRAM

## 2024-09-22 PROCEDURE — 82607 VITAMIN B-12: CPT | Performed by: STUDENT IN AN ORGANIZED HEALTH CARE EDUCATION/TRAINING PROGRAM

## 2024-09-22 PROCEDURE — 83540 ASSAY OF IRON: CPT | Performed by: STUDENT IN AN ORGANIZED HEALTH CARE EDUCATION/TRAINING PROGRAM

## 2024-09-22 PROCEDURE — 36410 VNPNXR 3YR/> PHY/QHP DX/THER: CPT

## 2024-09-22 PROCEDURE — 80053 COMPREHEN METABOLIC PANEL: CPT | Performed by: STUDENT IN AN ORGANIZED HEALTH CARE EDUCATION/TRAINING PROGRAM

## 2024-09-22 PROCEDURE — 83735 ASSAY OF MAGNESIUM: CPT | Performed by: STUDENT IN AN ORGANIZED HEALTH CARE EDUCATION/TRAINING PROGRAM

## 2024-09-22 PROCEDURE — A4216 STERILE WATER/SALINE, 10 ML: HCPCS | Performed by: INTERNAL MEDICINE

## 2024-09-22 PROCEDURE — 11000001 HC ACUTE MED/SURG PRIVATE ROOM

## 2024-09-22 RX ORDER — SODIUM CHLORIDE 0.9 % (FLUSH) 0.9 %
10 SYRINGE (ML) INJECTION
Status: DISCONTINUED | OUTPATIENT
Start: 2024-09-22 | End: 2024-09-27 | Stop reason: HOSPADM

## 2024-09-22 RX ORDER — SODIUM CHLORIDE 0.9 % (FLUSH) 0.9 %
10 SYRINGE (ML) INJECTION EVERY 6 HOURS
Status: DISCONTINUED | OUTPATIENT
Start: 2024-09-22 | End: 2024-09-27 | Stop reason: HOSPADM

## 2024-09-22 RX ADMIN — LOSARTAN POTASSIUM 100 MG: 50 TABLET, FILM COATED ORAL at 09:09

## 2024-09-22 RX ADMIN — TAMSULOSIN HYDROCHLORIDE 0.4 MG: 0.4 CAPSULE ORAL at 09:09

## 2024-09-22 RX ADMIN — FINASTERIDE 5 MG: 5 TABLET, FILM COATED ORAL at 09:09

## 2024-09-22 RX ADMIN — ENOXAPARIN SODIUM 40 MG: 40 INJECTION SUBCUTANEOUS at 04:09

## 2024-09-22 RX ADMIN — ACETAMINOPHEN 650 MG: 325 TABLET ORAL at 09:09

## 2024-09-22 RX ADMIN — SODIUM CHLORIDE, PRESERVATIVE FREE 10 ML: 5 INJECTION INTRAVENOUS at 12:09

## 2024-09-22 RX ADMIN — SODIUM CHLORIDE, PRESERVATIVE FREE 10 ML: 5 INJECTION INTRAVENOUS at 06:09

## 2024-09-22 RX ADMIN — CEFEPIME 2 G: 2 INJECTION, POWDER, FOR SOLUTION INTRAVENOUS at 04:09

## 2024-09-22 NOTE — PLAN OF CARE
Problem: Fall Injury Risk  Goal: Absence of Fall and Fall-Related Injury  Outcome: Progressing     Problem: UTI (Urinary Tract Infection)  Goal: Improved Infection Symptoms  Outcome: Progressing   Aaox4, midline placement schedule for 7am, pt understands how to use the call bell, No  needed for during shift, call bell within reach

## 2024-09-22 NOTE — PROGRESS NOTES
[facility] Medicine Progress Note    Primary Team: [facility] Hospitalist Team A  Attending Physician: Dot Lombardo MD  Resident: Dr. Johnson  Intern: Dr. Rodriguez    Date of Admit: 2024     Chief Complaint:   Chief Complaint   Patient presents with    MD referral     Patient reports to the ED complaining of dysuria. Patient was seen by MD and told to report to the ED for IV Abx due to UA results. Patient ambulatory to triage, NAD noted.       Subjective/Interval Events:      No acute events overnight. Denies nausea, fever, vomiting, abdominal/pelvic pain.    Objective    Objective   Last 24 Hour Vital Signs:  BP  Min: 149/70  Max: 185/73  Temp  Av.6 °F (36.4 °C)  Min: 97.4 °F (36.3 °C)  Max: 98.2 °F (36.8 °C)  Pulse  Av.7  Min: 59  Max: 83  Resp  Av  Min: 18  Max: 20  SpO2  Av.5 %  Min: 92 %  Max: 99 %  Weight  Av.5 kg (153 lb 3.5 oz)  Min: 69.5 kg (153 lb 3.5 oz)  Max: 69.5 kg (153 lb 3.5 oz)    Intake/Output Summary (Last 24 hours) at 2024 0816  Last data filed at 2024 0809  Gross per 24 hour   Intake 480 ml   Output 1250 ml   Net -770 ml       Physical Examination:  General:  resting comfortably in bed in no acute distress, conversational  Cardiovascular:  RRR, no murmurs appreciated  Respiratory:  clear to auscultation bilaterally, nonlabored breathing  Abdomen:  soft, non-tender, non-distended  :  negative CVA tenderness bilaterally  Extremities:  no pitting edema  Skin:  no rashes or ulcers noted  Neurologic:  Aox4, conversational, moving all extremities, ambulates    Laboratory:  Recent Labs   Lab 24  1459 24  0608 24  0545   WBC 5.40 4.09 4.65   HGB 11.7* 12.1* 12.7*   HCT 35.6* 36.7* 38.6*   * 132* 121*   MCV 91 91 91   RDW 13.6 13.4 13.4   * 139 139   K 3.9 3.9 4.3    108 109   CO2 25 26 23   BUN 13 13 15   CREATININE 1.2 1.0 1.0   * 112* 113*   PROT 7.4 6.8 7.0   ALBUMIN 3.7 3.4* 3.5   BILITOT 0.6 0.5 0.5   AST 16 16 17    ALKPHOS 79 77 75   ALT 10 11 9*       Microbiology:  Microbiology Results (last 7 days)       Procedure Component Value Units Date/Time    Blood culture #1 **CANNOT BE ORDERED STAT** [[id number]] Collected: 09/20/24 1618    Order Status: Completed Specimen: Blood from Peripheral, Antecubital, Right Updated: 09/22/24 0613     Blood Culture, Routine No Growth to date      No Growth to date    Blood culture #2 **CANNOT BE ORDERED STAT** [[id number]] Collected: 09/20/24 1551    Order Status: Completed Specimen: Blood from Peripheral, Forearm, Left Updated: 09/22/24 0613     Blood Culture, Routine No Growth to date      No Growth to date    Urine culture [[id number]]  (Abnormal) Collected: 09/20/24 1502    Order Status: Completed Specimen: Urine Updated: 09/21/24 1643     Urine Culture, Routine GRAM NEGATIVE ERIN  50,000 - 99,999 cfu/ml  Identification and susceptibility pending      Narrative:      Specimen Source->Urine             Imaging:  No orders to display        Current Medications:     Scheduled:   ceFEPime IV (PEDS and ADULTS)  2 g Intravenous Q12H    enoxparin  40 mg Subcutaneous Daily    finasteride  5 mg Oral Daily    losartan  100 mg Oral Daily    sodium chloride 0.9%  10 mL Intravenous Q6H    tamsulosin  0.4 mg Oral Daily         Infusions:       PRN:    Current Facility-Administered Medications:     acetaminophen, 650 mg, Oral, Q4H PRN    influenza (adjuvanted), 0.5 mL, Intramuscular, Prior to discharge    melatonin, 6 mg, Oral, Nightly PRN    sodium chloride 0.9%, 10 mL, Intravenous, Q12H PRN    Flushing PICC/Midline Protocol, , , Until Discontinued **AND** sodium chloride 0.9%, 10 mL, Intravenous, Q6H **AND** sodium chloride 0.9%, 10 mL, Intravenous, PRN       Assessment:     Magedio Padua is a 84 y.o. male with PMHx of prostate cancer, bladder outlet obstruction, HTN, HLD, and anemia who presented to [facility] on 9/20/2024 for IV Abx treatment of UTI per urologist.  Patient is admitted to [facility] for IV treatment of Pseudomonas UTI.  Patient with Pseudomonas resistant to PO Abx.  Will start IV cefepime and plan to discharge him after the weekend with IV Abx with home health.     Plan:     Pseudomonas UTI Resistant to PO Abx  - Urine cx from 8/15 with Pseudomonas aeurginosa.  Given Bactrim without improvement in dysuria.  - Urine cx from 9/5 with Pseudomonas aeruginosa resistant to PO Abx  - CT Renal Stone 9/13 without renal or ureter calculi  - Patient sent from urology clinic to ED for IV Abx treatment  - Afebrile, no leukocytosis, lactic acid WNL.  No systemic sx.  - UA suggestive of active UTI  - No CVA tenderness bilaterally  - Started on IV cefepime  - blood cultures NGTD  Plan:  - Continue IV cefepime. Plan for 7 day course total.  - Follow up on repeat urine cx- can be followed outpatient by urology  - Consulted CM for IV Abx with  on discharge     Stage 1 Prostate Cancer  Bladder Outlet Obstruction  - s/p TURP in 2021 due to bladder outlet obstruction  - pathology of TURP with prostate cancer  - not on any treatment, follows with urologist   - PSAs have been stable  - Continue home finasteride 5mg and tamsulosin 0.4mg     Bladder Diverticula with Calculi  - CT Renal Stone on 9/13 with large lateral bladder diverticula containing 2 small calculi  - No renal or ureter calculi  - Originally scheduled for cystoscopy on 9/18, unable to complete until resolve UTI  - Follow up with urologist after discharge     Hypertension  - Hypertensive on arrival   - Patient states he did not take HTN meds today  - Continue home losartan 100mg     Normocytic Anemia  - Hgb of 11.7 on admission  - baseline Hgb of 12.1  - ordered anemia studies     Thrombocytopenia  - Plt of 124 on admission  - Last CBC 2 years ago with Plt of 140  - Unclear etiology, CTM  - follow up outpatient for repeat CBC on discharge     Hyperlipidemia  - Hx of hyperlipidemia with being prescribed a statin  - No lipid profile on record  -  Does not take a statin currently  - outpatient follow up with PCP on discharge for initiation        Diet: Regular  Code: Full  DVT ppx:  Lovenox  Dispo: IV Abx treatment of UTI, plan to discharge home after the weekend on IV Abx with          Mari Vsaquez MD  [facility] Internal Medicine PGY-2    [facility] Hospitalist Pager numbers:   [facility] Hospital[facility] Medicine Team A (Berto/Grupo): [phone]  [facility] Hospital[facility] Medicine Team B (Yuridia/Ronni):  [phone]

## 2024-09-22 NOTE — PLAN OF CARE
Problem: Adult Inpatient Plan of Care  Goal: Plan of Care Review  Outcome: Progressing  Goal: Patient-Specific Goal (Individualized)  Outcome: Progressing  Goal: Absence of Hospital-Acquired Illness or Injury  Outcome: Progressing  Goal: Optimal Comfort and Wellbeing  Outcome: Progressing     Problem: Fall Injury Risk  Goal: Absence of Fall and Fall-Related Injury  Outcome: Progressing     Problem: UTI (Urinary Tract Infection)  Goal: Improved Infection Symptoms  Outcome: Progressing     Problem: Infection  Goal: Absence of Infection Signs and Symptoms  Outcome: Progressing

## 2024-09-23 LAB
BACTERIA UR CULT: ABNORMAL
BASOPHILS # BLD AUTO: 0.04 K/UL (ref 0–0.2)
BASOPHILS NFR BLD: 0.7 % (ref 0–1.9)
DIFFERENTIAL METHOD BLD: ABNORMAL
EOSINOPHIL # BLD AUTO: 0.2 K/UL (ref 0–0.5)
EOSINOPHIL NFR BLD: 2.8 % (ref 0–8)
ERYTHROCYTE [DISTWIDTH] IN BLOOD BY AUTOMATED COUNT: 13.4 % (ref 11.5–14.5)
HCT VFR BLD AUTO: 43.7 % (ref 40–54)
HGB BLD-MCNC: 13.9 G/DL (ref 14–18)
IMM GRANULOCYTES # BLD AUTO: 0.01 K/UL (ref 0–0.04)
IMM GRANULOCYTES NFR BLD AUTO: 0.2 % (ref 0–0.5)
LYMPHOCYTES # BLD AUTO: 2.8 K/UL (ref 1–4.8)
LYMPHOCYTES NFR BLD: 47.8 % (ref 18–48)
MCH RBC QN AUTO: 29.6 PG (ref 27–31)
MCHC RBC AUTO-ENTMCNC: 31.8 G/DL (ref 32–36)
MCV RBC AUTO: 93 FL (ref 82–98)
MONOCYTES # BLD AUTO: 0.6 K/UL (ref 0.3–1)
MONOCYTES NFR BLD: 10.5 % (ref 4–15)
NEUTROPHILS # BLD AUTO: 2.2 K/UL (ref 1.8–7.7)
NEUTROPHILS NFR BLD: 38 % (ref 38–73)
NRBC BLD-RTO: 0 /100 WBC
PLATELET # BLD AUTO: 156 K/UL (ref 150–450)
PLATELET BLD QL SMEAR: ABNORMAL
PMV BLD AUTO: 11.5 FL (ref 9.2–12.9)
RBC # BLD AUTO: 4.69 M/UL (ref 4.6–6.2)
WBC # BLD AUTO: 5.79 K/UL (ref 3.9–12.7)

## 2024-09-23 PROCEDURE — 25000003 PHARM REV CODE 250: Performed by: STUDENT IN AN ORGANIZED HEALTH CARE EDUCATION/TRAINING PROGRAM

## 2024-09-23 PROCEDURE — 85025 COMPLETE CBC W/AUTO DIFF WBC: CPT

## 2024-09-23 PROCEDURE — 25000003 PHARM REV CODE 250

## 2024-09-23 PROCEDURE — 25000003 PHARM REV CODE 250: Performed by: INTERNAL MEDICINE

## 2024-09-23 PROCEDURE — 11000001 HC ACUTE MED/SURG PRIVATE ROOM

## 2024-09-23 PROCEDURE — 36415 COLL VENOUS BLD VENIPUNCTURE: CPT

## 2024-09-23 PROCEDURE — A4216 STERILE WATER/SALINE, 10 ML: HCPCS | Performed by: INTERNAL MEDICINE

## 2024-09-23 PROCEDURE — 63600175 PHARM REV CODE 636 W HCPCS: Performed by: STUDENT IN AN ORGANIZED HEALTH CARE EDUCATION/TRAINING PROGRAM

## 2024-09-23 RX ORDER — SODIUM CHLORIDE 0.9 % (FLUSH) 0.9 %
10 SYRINGE (ML) INJECTION
Qty: 125 ML | Refills: 0 | Status: SHIPPED | OUTPATIENT
Start: 2024-09-23 | End: 2024-09-23

## 2024-09-23 RX ORDER — SODIUM CHLORIDE 0.9 % (FLUSH) 0.9 %
10 SYRINGE (ML) INJECTION
Start: 2024-09-23 | End: 2024-09-27 | Stop reason: HOSPADM

## 2024-09-23 RX ORDER — LANOLIN ALCOHOL/MO/W.PET/CERES
1000 CREAM (GRAM) TOPICAL DAILY
Status: DISCONTINUED | OUTPATIENT
Start: 2024-09-23 | End: 2024-09-27 | Stop reason: HOSPADM

## 2024-09-23 RX ORDER — SODIUM CHLORIDE 0.9 % (FLUSH) 0.9 %
10 SYRINGE (ML) INJECTION EVERY 6 HOURS
Start: 2024-09-23 | End: 2024-09-27 | Stop reason: HOSPADM

## 2024-09-23 RX ORDER — LANOLIN ALCOHOL/MO/W.PET/CERES
1000 CREAM (GRAM) TOPICAL DAILY
Qty: 90 TABLET | Refills: 0 | Status: SHIPPED | OUTPATIENT
Start: 2024-09-24 | End: 2024-12-23

## 2024-09-23 RX ORDER — SODIUM CHLORIDE 0.9 % (FLUSH) 0.9 %
10 SYRINGE (ML) INJECTION EVERY 6 HOURS
Qty: 125 ML | Refills: 1 | Status: SHIPPED | OUTPATIENT
Start: 2024-09-23 | End: 2024-09-23

## 2024-09-23 RX ADMIN — LOSARTAN POTASSIUM 100 MG: 50 TABLET, FILM COATED ORAL at 09:09

## 2024-09-23 RX ADMIN — SODIUM CHLORIDE, PRESERVATIVE FREE 10 ML: 5 INJECTION INTRAVENOUS at 05:09

## 2024-09-23 RX ADMIN — TAMSULOSIN HYDROCHLORIDE 0.4 MG: 0.4 CAPSULE ORAL at 09:09

## 2024-09-23 RX ADMIN — CEFEPIME 2 G: 2 INJECTION, POWDER, FOR SOLUTION INTRAVENOUS at 04:09

## 2024-09-23 RX ADMIN — ENOXAPARIN SODIUM 40 MG: 40 INJECTION SUBCUTANEOUS at 05:09

## 2024-09-23 RX ADMIN — FINASTERIDE 5 MG: 5 TABLET, FILM COATED ORAL at 09:09

## 2024-09-23 RX ADMIN — SODIUM CHLORIDE, PRESERVATIVE FREE 10 ML: 5 INJECTION INTRAVENOUS at 06:09

## 2024-09-23 RX ADMIN — CYANOCOBALAMIN TAB 1000 MCG 1000 MCG: 1000 TAB at 12:09

## 2024-09-23 RX ADMIN — SODIUM CHLORIDE, PRESERVATIVE FREE 10 ML: 5 INJECTION INTRAVENOUS at 12:09

## 2024-09-23 RX ADMIN — CEFEPIME 2 G: 2 INJECTION, POWDER, FOR SOLUTION INTRAVENOUS at 05:09

## 2024-09-23 NOTE — PLAN OF CARE
Problem: Adult Inpatient Plan of Care  Goal: Patient-Specific Goal (Individualized)  Outcome: Progressing  Goal: Optimal Comfort and Wellbeing  Outcome: Progressing     Problem: Fall Injury Risk  Goal: Absence of Fall and Fall-Related Injury  Outcome: Progressing     Problem: Infection  Goal: Absence of Infection Signs and Symptoms  Outcome: Progressing

## 2024-09-23 NOTE — PLAN OF CARE
BEKAH sent HH referrals via [employer] this AM. BEKAH sent message to Kathy with [facility]. SW will follow.     BEKAH met with pt at bedside to discuss d/c planning. Pt reported that he lives at home and will administer his IV abx at home himself. BEKAH contacted [facility] to inform them. SW will follow.    2:55PM Pt stated that he does not have any family that can help pt with his IV abx. Pt does not want to dc to a NH sw explained that we would have to work on placement as pt is medically ready and can not d/c home. Pt understands, sw will follow.    3:30pm BEKAH sent SNF referrals via [employer]. SW will follow.    KEISHA Galarza  [phone]    No future appointments.     09/23/24 0819   Post-Acute Status   Post-Acute Authorization Home Health   Home Health Status Referrals Sent   Discharge Plan   Discharge Plan A Home Health

## 2024-09-23 NOTE — PLAN OF CARE
AAOx4. Pt rejected . Tolerated meals and voids per urinal. No c/o pain, sob, n/v. Meds administered per MAR. IV and midline CDI. Safety m/t .   Problem: Adult Inpatient Plan of Care  Goal: Absence of Hospital-Acquired Illness or Injury  Outcome: Progressing  Goal: Optimal Comfort and Wellbeing  Outcome: Progressing  Goal: Readiness for Transition of Care  Outcome: Progressing     Problem: Fall Injury Risk  Goal: Absence of Fall and Fall-Related Injury  Outcome: Progressing     Problem: UTI (Urinary Tract Infection)  Goal: Improved Infection Symptoms  Outcome: Progressing     Problem: Comorbidity Management  Goal: Blood Pressure in Desired Range  Outcome: Progressing

## 2024-09-23 NOTE — PROGRESS NOTES
[facility] Medicine Progress Note    Primary Team: [facility] Hospitalist Team A  Attending Physician: Dot Lombardo MD  Resident: Dr. Johnson  Intern: Dr. Rodriguez    Date of Admit: 2024     Chief Complaint:   Chief Complaint   Patient presents with    MD referral     Patient reports to the ED complaining of dysuria. Patient was seen by MD and told to report to the ED for IV Abx due to UA results. Patient ambulatory to triage, NAD noted.       Subjective/Interval Events:      No acute events overnight and without any complaints this morning. States his dysuria is improving since being hospitalized. Denies nausea, fever, vomiting, abdominal/pelvic pain.    Objective    Objective   Last 24 Hour Vital Signs:  BP  Min: 144/67  Max: 163/77  Temp  Av.9 °F (36.6 °C)  Min: 97.5 °F (36.4 °C)  Max: 98.5 °F (36.9 °C)  Pulse  Av.7  Min: 59  Max: 69  Resp  Av.2  Min: 18  Max: 20  SpO2  Av.8 %  Min: 96 %  Max: 99 %    Intake/Output Summary (Last 24 hours) at 2024 0838  Last data filed at 2024 0741  Gross per 24 hour   Intake 820 ml   Output 1475 ml   Net -655 ml       Physical Examination:  General:  resting comfortably in bed in no acute distress, conversational  Cardiovascular:  RRR, no murmurs appreciated  Respiratory:  clear to auscultation bilaterally, nonlabored breathing  Abdomen:  soft, non-tender, non-distended  :  negative CVA tenderness bilaterally  Extremities:  no pitting edema  Skin:  no rashes or ulcers noted  Neurologic:  Aox4, conversational, moving all extremities, ambulates    Laboratory:  Recent Labs   Lab 24  1459 24  0608 24  0545 24  0551   WBC 5.40 4.09 4.65 5.79   HGB 11.7* 12.1* 12.7* 13.9*   HCT 35.6* 36.7* 38.6* 43.7   * 132* 121*  --    MCV 91 91 91 93   RDW 13.6 13.4 13.4 13.4   * 139 139  --    K 3.9 3.9 4.3  --     108 109  --    CO2 25 26 23  --    BUN 13 13 15  --    CREATININE 1.2 1.0 1.0  --    * 112* 113*  --     PROT 7.4 6.8 7.0  --    ALBUMIN 3.7 3.4* 3.5  --    BILITOT 0.6 0.5 0.5  --    AST 16 16 17  --    ALKPHOS 79 77 75  --    ALT 10 11 9*  --        Microbiology:  Microbiology Results (last 7 days)       Procedure Component Value Units Date/Time    Blood culture #1 **CANNOT BE ORDERED STAT** [[id number]] Collected: 09/20/24 1618    Order Status: Completed Specimen: Blood from Peripheral, Antecubital, Right Updated: 09/23/24 0613     Blood Culture, Routine No Growth to date      No Growth to date      No Growth to date    Blood culture #2 **CANNOT BE ORDERED STAT** [[id number]] Collected: 09/20/24 1551    Order Status: Completed Specimen: Blood from Peripheral, Forearm, Left Updated: 09/23/24 0613     Blood Culture, Routine No Growth to date      No Growth to date      No Growth to date    Urine culture [[id number]]  (Abnormal) Collected: 09/20/24 1502    Order Status: Completed Specimen: Urine Updated: 09/21/24 1643     Urine Culture, Routine GRAM NEGATIVE ERIN  50,000 - 99,999 cfu/ml  Identification and susceptibility pending      Narrative:      Specimen Source->Urine             Imaging:  No orders to display        Current Medications:     Scheduled:   ceFEPime IV (PEDS and ADULTS)  2 g Intravenous Q12H    enoxparin  40 mg Subcutaneous Daily    finasteride  5 mg Oral Daily    losartan  100 mg Oral Daily    sodium chloride 0.9%  10 mL Intravenous Q6H    tamsulosin  0.4 mg Oral Daily         Infusions:       PRN:    Current Facility-Administered Medications:     acetaminophen, 650 mg, Oral, Q4H PRN    influenza (adjuvanted), 0.5 mL, Intramuscular, Prior to discharge    melatonin, 6 mg, Oral, Nightly PRN    sodium chloride 0.9%, 10 mL, Intravenous, Q12H PRN    Flushing PICC/Midline Protocol, , , Until Discontinued **AND** sodium chloride 0.9%, 10 mL, Intravenous, Q6H **AND** sodium chloride 0.9%, 10 mL, Intravenous, PRN       Assessment:     Magedio Padua is a 84 y.o. male with PMHx of prostate cancer, bladder  outlet obstruction, HTN, HLD, and anemia who presented to [facility] on 9/20/2024 for IV Abx treatment of UTI per urologist.  Patient is admitted to [facility] for IV treatment of Pseudomonas UTI.  Patient with Pseudomonas resistant to PO Abx.  Will start IV cefepime and plan to discharge with IV Abx with home health.     Plan:     Pseudomonas UTI Resistant to PO Abx  - Urine cx from 8/15 with Pseudomonas aeurginosa.  Given Bactrim without improvement in dysuria.  - Urine cx from 9/5 with Pseudomonas aeruginosa resistant to PO Abx  - CT Renal Stone 9/13 without renal or ureter calculi  - Patient sent from urology clinic to ED for IV Abx treatment  - Afebrile, no leukocytosis, lactic acid WNL.  No systemic sx.  - UA suggestive of active UTI  - No CVA tenderness bilaterally  - Started on IV cefepime  - blood cultures NGTD  Plan:  - Continue IV cefepime. Plan for 7 day course total with end date 09/28/24  - Follow up on repeat urine cx- can be followed outpatient by urology  - Consulted CM for IV Abx with  on discharge     Stage 1 Prostate Cancer  Bladder Outlet Obstruction  - s/p TURP in 2021 due to bladder outlet obstruction  - pathology of TURP with prostate cancer  - not on any treatment, follows with urologist   - PSAs have been stable  - Continue home finasteride 5mg and tamsulosin 0.4mg     Bladder Diverticula with Calculi  - CT Renal Stone on 9/13 with large lateral bladder diverticula containing 2 small calculi  - No renal or ureter calculi  - Originally scheduled for cystoscopy on 9/18, unable to complete until resolve UTI  - Follow up with urologist after discharge     Hypertension  - Hypertensive on arrival   - Patient states he did not take HTN meds today  - Continue home losartan 100mg     Normocytic Anemia  - Hgb of 11.7 on admission  - baseline Hgb of 12.1  - ordered anemia studies     Thrombocytopenia  - Plt of 124 on admission  - Last CBC 2 years ago with Plt of 140  - Unclear etiology, CTM  -  follow up outpatient for repeat CBC on discharge     Hyperlipidemia  - Hx of hyperlipidemia with being prescribed a statin  - No lipid profile on record  - Does not take a statin currently  - outpatient follow up with PCP on discharge for initiation        Diet: Regular  Code: Full  DVT ppx:  Lovenox  Dispo: IV Abx treatment of UTI, plan to discharge home after the weekend on IV Abx with       Alena Fitzpatrick MD  [facility] Internal Medicine -III    [facility] Medicine Hospitalist Pager numbers:   [facility] Hospitalist Medicine Team A (Berto/Grupo): [phone]  [facility] Hospitalist Medicine Team B (Yuridia/Ronni):  [phone]

## 2024-09-24 LAB
ALBUMIN SERPL BCP-MCNC: 3.4 G/DL (ref 3.5–5.2)
ALP SERPL-CCNC: 74 U/L (ref 55–135)
ALT SERPL W/O P-5'-P-CCNC: 10 U/L (ref 10–44)
ANION GAP SERPL CALC-SCNC: 9 MMOL/L (ref 8–16)
AST SERPL-CCNC: 18 U/L (ref 10–40)
BILIRUB SERPL-MCNC: 0.4 MG/DL (ref 0.1–1)
BUN SERPL-MCNC: 18 MG/DL (ref 8–23)
CALCIUM SERPL-MCNC: 8.7 MG/DL (ref 8.7–10.5)
CHLORIDE SERPL-SCNC: 108 MMOL/L (ref 95–110)
CO2 SERPL-SCNC: 22 MMOL/L (ref 23–29)
CREAT SERPL-MCNC: 1 MG/DL (ref 0.5–1.4)
EST. GFR  (NO RACE VARIABLE): >60 ML/MIN/1.73 M^2
GLUCOSE SERPL-MCNC: 114 MG/DL (ref 70–110)
POTASSIUM SERPL-SCNC: 4 MMOL/L (ref 3.5–5.1)
PROT SERPL-MCNC: 6.8 G/DL (ref 6–8.4)
SODIUM SERPL-SCNC: 139 MMOL/L (ref 136–145)

## 2024-09-24 PROCEDURE — 25000003 PHARM REV CODE 250: Performed by: STUDENT IN AN ORGANIZED HEALTH CARE EDUCATION/TRAINING PROGRAM

## 2024-09-24 PROCEDURE — 97165 OT EVAL LOW COMPLEX 30 MIN: CPT

## 2024-09-24 PROCEDURE — 80053 COMPREHEN METABOLIC PANEL: CPT

## 2024-09-24 PROCEDURE — 63600175 PHARM REV CODE 636 W HCPCS: Performed by: STUDENT IN AN ORGANIZED HEALTH CARE EDUCATION/TRAINING PROGRAM

## 2024-09-24 PROCEDURE — 97530 THERAPEUTIC ACTIVITIES: CPT

## 2024-09-24 PROCEDURE — A4216 STERILE WATER/SALINE, 10 ML: HCPCS | Performed by: INTERNAL MEDICINE

## 2024-09-24 PROCEDURE — 11000001 HC ACUTE MED/SURG PRIVATE ROOM

## 2024-09-24 PROCEDURE — 25000003 PHARM REV CODE 250

## 2024-09-24 PROCEDURE — 97162 PT EVAL MOD COMPLEX 30 MIN: CPT

## 2024-09-24 PROCEDURE — 25000003 PHARM REV CODE 250: Performed by: INTERNAL MEDICINE

## 2024-09-24 PROCEDURE — 36415 COLL VENOUS BLD VENIPUNCTURE: CPT

## 2024-09-24 PROCEDURE — 97116 GAIT TRAINING THERAPY: CPT

## 2024-09-24 RX ADMIN — ENOXAPARIN SODIUM 40 MG: 40 INJECTION SUBCUTANEOUS at 04:09

## 2024-09-24 RX ADMIN — CYANOCOBALAMIN TAB 1000 MCG 1000 MCG: 1000 TAB at 09:09

## 2024-09-24 RX ADMIN — SODIUM CHLORIDE, PRESERVATIVE FREE 10 ML: 5 INJECTION INTRAVENOUS at 12:09

## 2024-09-24 RX ADMIN — FINASTERIDE 5 MG: 5 TABLET, FILM COATED ORAL at 09:09

## 2024-09-24 RX ADMIN — CEFEPIME 2 G: 2 INJECTION, POWDER, FOR SOLUTION INTRAVENOUS at 04:09

## 2024-09-24 RX ADMIN — SODIUM CHLORIDE, PRESERVATIVE FREE 10 ML: 5 INJECTION INTRAVENOUS at 06:09

## 2024-09-24 RX ADMIN — TAMSULOSIN HYDROCHLORIDE 0.4 MG: 0.4 CAPSULE ORAL at 09:09

## 2024-09-24 RX ADMIN — LOSARTAN POTASSIUM 100 MG: 50 TABLET, FILM COATED ORAL at 09:09

## 2024-09-24 NOTE — PLAN OF CARE
OT evaluation completed.  Patient with PLOF of independence and with hx of 0 falls in the past 3 months. On evaluation this date patient at independent level of function inclusive of backwards walking, picking up items off floor, and putting / on taking off LB clothing. Skilled acute OT not warranted. Recommend d/c home when stable.      Problem: Occupational Therapy  Goal: Occupational Therapy Goal  Description: Goals to be met by: 2024     Patient will increase functional independence with ADLs by performin% reciprocation of at least two techniques to mitigate fall risk/prevent falls. Adequate  Perform LB ADLs with modified independence including mobility. met    2024 0956 by Daisha South, OT  Outcome: Adequate for Care Transition

## 2024-09-24 NOTE — PT/OT/SLP EVAL
"Physical Therapy Evaluation and Discharge Note    Patient Name:  Magedio Padua   MRN:  [id number]    Recommendations:     Discharge Recommendations: No Therapy Indicated  Discharge Equipment Recommendations: none   Barriers to discharge: None    Assessment:     Magedio Padua is a 84 y.o. male admitted with a medical diagnosis of Pseudomonas urinary tract infection. .  At this time, patient is functioning at their prior level of function and does not require further acute PT services.     Recent Surgery: * No surgery found *      Plan:     During this hospitalization, patient does not require further acute PT services.  Please re-consult if situation changes.      Subjective     Chief Complaint: "I am ready to go home"  Patient/Family Comments/goals: to return home  Pain/Comfort:  Pain Rating 1: 3/10  Location - Orientation 1: lower  Location 1: abdomen  Pain Addressed 1: Reposition, Distraction  Pain Rating Post-Intervention 1: 3/10    Patients cultural, spiritual, Scientologist conflicts given the current situation: no    Living Environment:  Patient lives alone in 1 , no SIMON, T/S in bathroom.    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  Equipment used at home: none.  DME owned (not currently used): none.  Upon discharge, patient will have assistance from unknown.    Objective:     Communicated with nurse Dumont prior to session.  Patient found up in chair with peripheral IV upon PT entry to room.    General Precautions: Standard, fall    Orthopedic Precautions:N/A   Braces: N/A  Respiratory Status: Room air    Exams:  Cognitive Exam:  Patient is oriented to Person, Place, Time, Situation, and follows multi step commands  Gross Motor Coordination:  WFL  Postural Exam:  Patient presented with the following abnormalities:    -       Rounded shoulders  -       Forward head  Sensation:    -       Intact  light/touch B LE grossly  Skin Integrity/Edema:  "     -       Skin integrity: Visible skin intact  -       Edema: None noted    RLE ROM: WFL  RLE Strength: WFL  LLE ROM: WFL  LLE Strength: WFL    Functional Mobility:  Transfers:     Sit to Stand:  independence with no AD  Gait: with no AD x 200' at independent level, good pace, wide POLO, decreased heel strike  Balance: Seated: Independent   Standing:  no LOB, Independent     AM-PAC 6 CLICK MOBILITY  Total Score:24       Treatment and Education:  Patient educated on role of physical therapy and goals.  Patient reports he lives alone, however is independent.  Patient educated on progressive walking program.      AM-PAC 6 CLICK MOBILITY  Total Score:24     Patient left up in chair with all lines intact, call button in reach, and nurse notified.    GOALS:   Multidisciplinary Problems       Physical Therapy Goals          Problem: Physical Therapy    Goal Priority Disciplines Outcome Goal Variances Interventions   Physical Therapy Goal     PT, PT/OT Adequate for Care Transition     Description: Goals to be met by: 2024     Patient will increase functional independence with mobility by performin. Gait  x 200 feet with [place] using No Assistive Device:  GOAL MET.                          History:     Past Medical History:   Diagnosis Date    Abdominal pain     High cholesterol     Pseudomonas urinary tract infection 2024    Suprapubic pain, acute 08/15/2024       Past Surgical History:   Procedure Laterality Date    COLONOSCOPY N/A 2016    Procedure: History of melena/Colonoscopy;  Surgeon: Alejandro Borrero MD;  Location: [facility];  Service: Endoscopy;  Laterality: N/A;    CYSTOSCOPY WITH URODYNAMIC TESTING N/A 2021    Procedure: CYSTOSCOPY, WITH URODYNAMIC TESTING FLOUROSCOPIC;  Surgeon: Palmer Mendoza MD;  Location: [address];  Service: Urology;  Laterality: N/A;  1HR    TRANSURETHRAL RESECTION OF PROSTATE N/A 2021    Procedure: TURP (TRANSURETHRAL RESECTION OF  PROSTATE);  Surgeon: Jeanna Cardona MD;  Location: [facility];  Service: Urology;  Laterality: N/A;       Time Tracking:     PT Received On: 09/24/24  PT Start Time: 0955     PT Stop Time: 1010  PT Total Time (min): 15 min     Billable Minutes: Evaluation 7 and Gait Training 8      09/24/2024

## 2024-09-24 NOTE — PROGRESS NOTES
[facility] Hospital Medicine Progress Note    Primary Team: [facility] Hospitalist Team A  Attending Physician: Dot Lombardo MD  Resident: Dr. Gutierrez  Intern: Dr. Rodriguez    Date of Admit: 2024     Chief Complaint:   Chief Complaint   Patient presents with    MD referral     Patient reports to the ED complaining of dysuria. Patient was seen by MD and told to report to the ED for IV Abx due to UA results. Patient ambulatory to triage, NAD noted.       Subjective/Interval Events:      No acute events overnight and without any complaints this morning. On 7 day course of cefepime for UTI. He does not want to be discharged on IV Abx to home or SNF.  He wishes to stay in hospital for full course.  Will discuss with CM.    Objective    Objective   Last 24 Hour Vital Signs:  BP  Min: 139/65  Max: 163/77  Temp  Av.8 °F (36.6 °C)  Min: 97.5 °F (36.4 °C)  Max: 98.1 °F (36.7 °C)  Pulse  Av.5  Min: 59  Max: 77  Resp  Av  Min: 18  Max: 20  SpO2  Av.8 %  Min: 98 %  Max: 100 %    Intake/Output Summary (Last 24 hours) at 2024 0705  Last data filed at 2024 0438  Gross per 24 hour   Intake 513.2 ml   Output 1130 ml   Net -616.8 ml       Physical Examination:  General:  resting comfortably in bed in no acute distress, conversational  Cardiovascular:  RRR, no murmurs appreciated  Respiratory:  clear to auscultation bilaterally, nonlabored breathing  Abdomen:  soft, non-tender, non-distended  :  negative CVA tenderness bilaterally  Extremities:  no pitting edema  Skin:  no rashes or ulcers noted  Neurologic:  Aox4, conversational, moving all extremities, ambulates    Laboratory:  Recent Labs   Lab 24  1459 24  0608 24  0545 24  0551   WBC 5.40 4.09 4.65 5.79   HGB 11.7* 12.1* 12.7* 13.9*   HCT 35.6* 36.7* 38.6* 43.7   * 132* 121* 156   MCV 91 91 91 93   RDW 13.6 13.4 13.4 13.4   * 139 139  --    K 3.9 3.9 4.3  --     108 109  --    CO2 25 26 23  --    BUN 13 13 15  --     CREATININE 1.2 1.0 1.0  --    * 112* 113*  --    PROT 7.4 6.8 7.0  --    ALBUMIN 3.7 3.4* 3.5  --    BILITOT 0.6 0.5 0.5  --    AST 16 16 17  --    ALKPHOS 79 77 75  --    ALT 10 11 9*  --        Microbiology:  Microbiology Results (last 7 days)       Procedure Component Value Units Date/Time    Blood culture #1 **CANNOT BE ORDERED STAT** [[id number]] Collected: 09/20/24 1618    Order Status: Completed Specimen: Blood from Peripheral, Antecubital, Right Updated: 09/24/24 0613     Blood Culture, Routine No Growth to date      No Growth to date      No Growth to date      No Growth to date    Blood culture #2 **CANNOT BE ORDERED STAT** [[id number]] Collected: 09/20/24 1551    Order Status: Completed Specimen: Blood from Peripheral, Forearm, Left Updated: 09/24/24 0613     Blood Culture, Routine No Growth to date      No Growth to date      No Growth to date      No Growth to date    Urine culture [[id number]]  (Abnormal)  (Susceptibility) Collected: 09/20/24 1502    Order Status: Completed Specimen: Urine Updated: 09/23/24 1348     Urine Culture, Routine PSEUDOMONAS AERUGINOSA  50,000 - 99,999 cfu/ml      Narrative:      Specimen Source->Urine             Imaging:  No orders to display        Current Medications:     Scheduled:   ceFEPime IV (PEDS and ADULTS)  2 g Intravenous Q12H    cyanocobalamin  1,000 mcg Oral Daily    enoxparin  40 mg Subcutaneous Daily    finasteride  5 mg Oral Daily    losartan  100 mg Oral Daily    sodium chloride 0.9%  10 mL Intravenous Q6H    tamsulosin  0.4 mg Oral Daily         Infusions:       PRN:    Current Facility-Administered Medications:     acetaminophen, 650 mg, Oral, Q4H PRN    influenza (adjuvanted), 0.5 mL, Intramuscular, Prior to discharge    melatonin, 6 mg, Oral, Nightly PRN    sodium chloride 0.9%, 10 mL, Intravenous, Q12H PRN    Flushing PICC/Midline Protocol, , , Until Discontinued **AND** sodium chloride 0.9%, 10 mL, Intravenous, Q6H **AND** sodium chloride  0.9%, 10 mL, Intravenous, PRN       Assessment:     Magedio Padua is a 84 y.o. male with PMHx of prostate cancer, bladder outlet obstruction, HTN, HLD, and anemia who presented to [facility] on 9/20/2024 for IV Abx treatment of UTI per urologist.  Patient is admitted to [facility] for IV treatment of Pseudomonas UTI.  Patient with Pseudomonas resistant to PO Abx.  Will start IV cefepime and plan to discharge with IV Abx with home health vs SNF.     Plan:     Pseudomonas UTI Resistant to PO Abx  - Urine cx from 8/15 with Pseudomonas aeurginosa.  Given Bactrim without improvement in dysuria.  - Urine cx from 9/5 with Pseudomonas aeruginosa resistant to PO Abx  - CT Renal Stone 9/13 without renal or ureter calculi  - Patient sent from urology clinic to ED for IV Abx treatment  - Afebrile, no leukocytosis, lactic acid WNL.  No systemic sx.  - UA suggestive of active UTI  - No CVA tenderness bilaterally  - Started on IV cefepime  - blood cultures NGTD  - Repeat urine culture 9/20 with Pseudomonas aeruginosa sensitive to cefepime  Plan:  - Continue IV cefepime. Plan for 7 day course total with end date on 9/27/24  -  working on IV Abx at discharge with SNF vs .  Patient refusing as he does not feel comfortable with IV Abx at home and does not want to go to SNF.  - Patient is medically ready for discharge     Stage 1 Prostate Cancer  Bladder Outlet Obstruction  - s/p TURP in 2021 due to bladder outlet obstruction  - pathology of TURP with prostate cancer  - not on any treatment, follows with urologist   - PSAs have been stable  - Continue home finasteride 5mg and tamsulosin 0.4mg     Bladder Diverticula with Calculi  - CT Renal Stone on 9/13 with large lateral bladder diverticula containing 2 small calculi  - No renal or ureter calculi  - Originally scheduled for cystoscopy on 9/18, unable to complete until resolve UTI  - Follow up with urologist after discharge     Hypertension  - Hypertensive on arrival   -  Patient states he did not take HTN meds today  - Continue home losartan 100mg     Normocytic Anemia  - Hgb of 11.7 on admission  - baseline Hgb of 12.1  - ordered anemia studies     Thrombocytopenia  - Plt of 124 on admission  - Last CBC 2 years ago with Plt of 140  - Unclear etiology, CTM  - follow up outpatient for repeat CBC on discharge     Hyperlipidemia  - Hx of hyperlipidemia with being prescribed a statin  - No lipid profile on record  - Does not take a statin currently  - outpatient follow up with PCP on discharge for initiation        Diet: Regular  Code: Full  DVT ppx:  Lovenox  Dispo: IV Abx treatment of UTI, discharge pending CM for possible discharge with IV Abx     Cruzito Dear, DO  [facility] Internal Medicine HO-I    [facility] Medicine Hospitalist Pager numbers:   [facility] Hospitalist Medicine Team A (Berto/Grupo): [phone]  [facility] Hospitalist Medicine Team B (Yuridia/Ronni):  [phone]

## 2024-09-24 NOTE — PLAN OF CARE
Problem: Adult Inpatient Plan of Care  Goal: Plan of Care Review  Outcome: Progressing  Goal: Optimal Comfort and Wellbeing  Outcome: Progressing     Problem: UTI (Urinary Tract Infection)  Goal: Improved Infection Symptoms  Outcome: Progressing     Problem: Comorbidity Management  Goal: Blood Pressure in Desired Range  Outcome: Progressing     Problem: Infection  Goal: Absence of Infection Signs and Symptoms  Outcome: Progressing

## 2024-09-24 NOTE — NURSING
Pt safety maintained. Medication administered per MAR. Pt denies any needs at this time. Pt instructed to call w/any needs, verbalized understanding. Bed in lowest  position, locked and bed alarms on. Call light w/in pt's reach.

## 2024-09-24 NOTE — PT/OT/SLP EVAL
Jack states patient is OK to discharge, patient states she feels comfortable with plan of care Occupational Therapy   Evaluation treatment and Discharge Note    Name: Magedio Padua  MRN: [id number]  Admitting Diagnosis: Pseudomonas urinary tract infection  Recent Surgery: * No surgery found *      Recommendations:     Discharge Recommendations: No Therapy Indicated  Discharge Equipment Recommendations: none  Barriers to discharge:  Other (Comment) (No OT barriers)    Assessment:     Magedio Padua is a 84 y.o. male with a medical diagnosis of Pseudomonas urinary tract infection. At this time, patient is functioning at their prior level of function and does not require further acute OT services.     OT evaluation completed.  On evaluation this date patient at independent level of function inclusive of backwards walking, picking up items off floor, and putting / on taking off LB clothing. Skilled acute OT not warranted. Recommend d/c home when stable.      Plan:     During this hospitalization, patient does not require further acute OT services.  Please re-consult if situation changes.    Plan of Care Reviewed with: patient    Subjective     Chief Complaint: mild lower abdomen discomfort  Patient/Family Comments/goals: thanks therapist; reports his goal is to go home    Occupational Profile:  Living Environment: resides alone in a single story home, tub shower, no steps to enter  Previous level of function: Patient with PLOF of independence in ADL/ IADL, mobility with no device use, driving and with hx of 0 falls in the past 3 months. Prior hx of prostate cancer = reports usually able to fully empty bladder  Equipment Used at home: none  Assistance upon Discharge: self    Pain/Comfort:  Pain Rating 1: 4/10 (lower abdomen)  Pain Addressed 1: Reposition, Cessation of Activity, Nurse notified  Pain Rating Post-Intervention 1: 4/10 (abdomen as above)      Objective:     Communicated with: nursing prior to session.  Patient found HOB elevated with Other (comments) (IV (disconnected by RN)) upon OT entry to  room.    General Precautions: Standard, fall  Orthopedic Precautions: N/A  Braces: N/A  Respiratory Status: Room air     Occupational Performance:    Bed Mobility:    Patient completed Supine to Sit with independence  Patient completed Sit to Supine with independence    Functional Mobility/Transfers:  Patient completed Sit <> Stand Transfer with independence  with  no assistive device   Patient completed Toilet Transfer Step Transfer technique with independence with  no AD  Functional Mobility: in room / out of room mobility inclusive of hallway mobility, backwards walking, dual tasking, with independence; modified independence picking up object off floor (min verbal cues post task for technique change of flexing hips/knees vs forward trunk flexion)    Activities of Daily Living:  Grooming: independence standing at sink  Lower Body Dressing: modified independence ; including item retrieval; stands and steps into clothing; educated on technique for sitting to initiate task  Toileting: independence true bathroom    Cognitive/Visual Perceptual:  Cognitive/Psychosocial Skills:     -       Oriented to: Person, Place, Time, and Situation   -       Follows Commands/attention:Follows two-step commands  -       Communication: [facility] primary language; understands simple English phrases and speaks some English  -       Mood/Affect/Coping skills/emotional control:cooperative    Physical Exam:  Upper Extremity Range of Motion:     -       Right Upper Extremity: WFL  -       Left Upper Extremity: WFL  Upper Extremity Strength:    -       Right Upper Extremity: WFL  -       Left Upper Extremity: WFL   Strength:    -       Right Upper Extremity: WFL  -       Left Upper Extremity: WFL    AMPAC 6 Click ADL:  AMPAC Total Score: 24    Treatment & Education:  Patient educated on role of OT.  Patient speaking some English but agreeable, allows therapist to utilize virtual  service (Danyelle #[id number] in [facility]).  Patient  instructed on slow position changes and throughout session denies any shortness of breath or exertion.  ADL / functional mobility efforts as above.  Educated patient on improved techniques for picking up object off floor and safer technique strategy when performing item retrieval/transport.  Returned to room.  Patient sat up in chair, reporting no further inquiries.   Agreement for no skilled OT needs (patient mobilizing self in room independently/ taking self to bathroom)    Patient left up in chair with all lines intact and nursing notified    GOALS:   Multidisciplinary Problems       Occupational Therapy Goals          Problem: Occupational Therapy    Goal Priority Disciplines Outcome Interventions   Occupational Therapy Goal     OT, PT/OT Adequate for Care Transition    Description: Goals to be met by: 2024     Patient will increase functional independence with ADLs by performin% reciprocation of at least two techniques to mitigate fall risk/prevent falls. Adequate  Perform LB ADLs with modified independence including mobility. met                         History:     Past Medical History:   Diagnosis Date    Abdominal pain     High cholesterol     Pseudomonas urinary tract infection 2024    Suprapubic pain, acute 08/15/2024         Past Surgical History:   Procedure Laterality Date    COLONOSCOPY N/A 2016    Procedure: History of melena/Colonoscopy;  Surgeon: Alejandro Borrero MD;  Location: [facility];  Service: Endoscopy;  Laterality: N/A;    CYSTOSCOPY WITH URODYNAMIC TESTING N/A 2021    Procedure: CYSTOSCOPY, WITH URODYNAMIC TESTING FLOUROSCOPIC;  Surgeon: Palmer Mendoza MD;  Location: [address];  Service: Urology;  Laterality: N/A;  1HR    TRANSURETHRAL RESECTION OF PROSTATE N/A 2021    Procedure: TURP (TRANSURETHRAL RESECTION OF PROSTATE);  Surgeon: Jeanna Cardona MD;  Location: [facility];  Service: Urology;  Laterality: N/A;       Time Tracking:     OT Date of  Treatment: 09/24/24  OT Start Time: 0922  OT Stop Time: 0950  OT Total Time (min): 28 min    Billable Minutes:Evaluation 18 min  Therapeutic Activity 10 min    9/24/2024

## 2024-09-24 NOTE — PLAN OF CARE
Patient seen for physical therapy evaluation on this date.  Patient at prior baseline level, no inpatient therapy needs.  Discharge Physical Therapy.      Problem: Physical Therapy  Goal: Physical Therapy Goal  Description: Goals to be met by: 2024     Patient will increase functional independence with mobility by performin. Gait  x 200 feet with [place] using No Assistive Device:  GOAL MET.     Outcome: Adequate for Care Transition

## 2024-09-25 PROCEDURE — 63600175 PHARM REV CODE 636 W HCPCS: Performed by: STUDENT IN AN ORGANIZED HEALTH CARE EDUCATION/TRAINING PROGRAM

## 2024-09-25 PROCEDURE — 11000001 HC ACUTE MED/SURG PRIVATE ROOM

## 2024-09-25 PROCEDURE — 25000003 PHARM REV CODE 250

## 2024-09-25 PROCEDURE — 25000003 PHARM REV CODE 250: Performed by: STUDENT IN AN ORGANIZED HEALTH CARE EDUCATION/TRAINING PROGRAM

## 2024-09-25 PROCEDURE — A4216 STERILE WATER/SALINE, 10 ML: HCPCS | Performed by: STUDENT IN AN ORGANIZED HEALTH CARE EDUCATION/TRAINING PROGRAM

## 2024-09-25 RX ADMIN — CEFEPIME 2 G: 2 INJECTION, POWDER, FOR SOLUTION INTRAVENOUS at 04:09

## 2024-09-25 RX ADMIN — ENOXAPARIN SODIUM 40 MG: 40 INJECTION SUBCUTANEOUS at 06:09

## 2024-09-25 RX ADMIN — SODIUM CHLORIDE, PRESERVATIVE FREE 10 ML: 5 INJECTION INTRAVENOUS at 11:09

## 2024-09-25 RX ADMIN — CYANOCOBALAMIN TAB 1000 MCG 1000 MCG: 1000 TAB at 09:09

## 2024-09-25 RX ADMIN — SODIUM CHLORIDE, PRESERVATIVE FREE 10 ML: 5 INJECTION INTRAVENOUS at 10:09

## 2024-09-25 RX ADMIN — TAMSULOSIN HYDROCHLORIDE 0.4 MG: 0.4 CAPSULE ORAL at 09:09

## 2024-09-25 RX ADMIN — SODIUM CHLORIDE, PRESERVATIVE FREE 10 ML: 5 INJECTION INTRAVENOUS at 06:09

## 2024-09-25 RX ADMIN — CEFEPIME 2 G: 2 INJECTION, POWDER, FOR SOLUTION INTRAVENOUS at 06:09

## 2024-09-25 RX ADMIN — SODIUM CHLORIDE, PRESERVATIVE FREE 10 ML: 5 INJECTION INTRAVENOUS at 12:09

## 2024-09-25 RX ADMIN — FINASTERIDE 5 MG: 5 TABLET, FILM COATED ORAL at 09:09

## 2024-09-25 RX ADMIN — LOSARTAN POTASSIUM 100 MG: 50 TABLET, FILM COATED ORAL at 09:09

## 2024-09-25 NOTE — PLAN OF CARE
SW sent updated clinical via Blip. Pt is being considered by a few SNF. Cm will continue to follow pt through transitions of care and assist with any discharge needs.    Papito Bowens, MSCHANDLER  [phone]    Future Appointments   Date Time Provider Department Center   10/2/2024  1:00 PM Lizeth Marcial MD [facility] IMPRI [facility]   10/16/2024 10:30 AM Cruzito Hood MD [facility] UROLOGY [facility]        09/25/24 1137   Post-Acute Status   Post-Acute Authorization Placement   Discharge Delays None known at this time   Discharge Plan   Discharge Plan A Skilled Nursing Facility

## 2024-09-25 NOTE — NURSING
Pt safety maintained. Antibiotics administered per MAR. Pt instructed to call w/any needs, verbalized understanding. Bed in lowest position and locked. Call light w/in pt's reach.

## 2024-09-25 NOTE — PROGRESS NOTES
hospitals Hospital Medicine Progress Note    Primary Team: hospitals Hospitalist Team A  Attending Physician: Dot Lombardo MD  Resident: Dr. Fitzpatrick  Intern: Dr. Rodriguez    Date of Admit: 2024     Chief Complaint:   Chief Complaint   Patient presents with    MD referral     Patient reports to the ED complaining of dysuria. Patient was seen by MD and told to report to the ED for IV Abx due to UA results. Patient ambulatory to triage, NAD noted.       Subjective/Interval Events:      No acute events overnight and without any complaints this morning. On day 5 of cefepime for UTI. He does not want to be discharged on IV Abx to home or SNF.  He wishes to stay in hospital for full course.       Objective    Objective   Last 24 Hour Vital Signs:  BP  Min: 121/78  Max: 158/69  Temp  Av.7 °F (36.5 °C)  Min: 97.5 °F (36.4 °C)  Max: 98.1 °F (36.7 °C)  Pulse  Av.8  Min: 65  Max: 72  Resp  Av  Min: 18  Max: 18  SpO2  Av.4 %  Min: 95 %  Max: 99 %    Intake/Output Summary (Last 24 hours) at 2024 0728  Last data filed at 2024 0503  Gross per 24 hour   Intake 1077.77 ml   Output 1800 ml   Net -722.23 ml       Physical Examination:  General:  resting comfortably in bed in no acute distress, conversational  Cardiovascular:  RRR, no murmurs appreciated  Respiratory:  clear to auscultation bilaterally, nonlabored breathing  Abdomen:  soft, non-tender, non-distended  :  negative CVA tenderness bilaterally  Extremities:  no pitting edema  Skin:  no rashes or ulcers noted  Neurologic:  Aox4, conversational, moving all extremities, ambulates    Laboratory:  Recent Labs   Lab 24  0608 24  0545 24  0551 24  0446   WBC 4.09 4.65 5.79  --    HGB 12.1* 12.7* 13.9*  --    HCT 36.7* 38.6* 43.7  --    * 121* 156  --    MCV 91 91 93  --    RDW 13.4 13.4 13.4  --     139  --  139   K 3.9 4.3  --  4.0    109  --  108   CO2 26 23  --  22*   BUN 13 15  --  18   CREATININE 1.0 1.0  --   1.0   * 113*  --  114*   PROT 6.8 7.0  --  6.8   ALBUMIN 3.4* 3.5  --  3.4*   BILITOT 0.5 0.5  --  0.4   AST 16 17  --  18   ALKPHOS 77 75  --  74   ALT 11 9*  --  10       Microbiology:  Microbiology Results (last 7 days)       Procedure Component Value Units Date/Time    Blood culture #1 **CANNOT BE ORDERED STAT** [[id number]] Collected: 09/20/24 1618    Order Status: Completed Specimen: Blood from Peripheral, Antecubital, Right Updated: 09/25/24 0612     Blood Culture, Routine No Growth to date      No Growth to date      No Growth to date      No Growth to date      No Growth to date    Blood culture #2 **CANNOT BE ORDERED STAT** [[id number]] Collected: 09/20/24 1551    Order Status: Completed Specimen: Blood from Peripheral, Forearm, Left Updated: 09/25/24 0612     Blood Culture, Routine No Growth to date      No Growth to date      No Growth to date      No Growth to date      No Growth to date    Urine culture [[id number]]  (Abnormal)  (Susceptibility) Collected: 09/20/24 1502    Order Status: Completed Specimen: Urine Updated: 09/24/24 1042     Urine Culture, Routine PSEUDOMONAS AERUGINOSA  50,000 - 99,999 cfu/ml      Narrative:      Specimen Source->Urine             Imaging:  No orders to display        Current Medications:     Scheduled:   ceFEPime IV (PEDS and ADULTS)  2 g Intravenous Q12H    cyanocobalamin  1,000 mcg Oral Daily    enoxparin  40 mg Subcutaneous Daily    finasteride  5 mg Oral Daily    losartan  100 mg Oral Daily    sodium chloride 0.9%  10 mL Intravenous Q6H    tamsulosin  0.4 mg Oral Daily         Infusions:       PRN:    Current Facility-Administered Medications:     acetaminophen, 650 mg, Oral, Q4H PRN    influenza (adjuvanted), 0.5 mL, Intramuscular, Prior to discharge    melatonin, 6 mg, Oral, Nightly PRN    sodium chloride 0.9%, 10 mL, Intravenous, Q12H PRN    Flushing PICC/Midline Protocol, , , Until Discontinued **AND** sodium chloride 0.9%, 10 mL, Intravenous, Q6H **AND**  sodium chloride 0.9%, 10 mL, Intravenous, PRN       Assessment:     Magedio Padua is a 84 y.o. male with PMHx of prostate cancer, bladder outlet obstruction, HTN, HLD, and anemia who presented to [facility] on 9/20/2024 for IV Abx treatment of UTI per urologist.  Patient is admitted to [facility] for IV treatment of Pseudomonas UTI.  Patient with Pseudomonas resistant to PO Abx.  Will start IV cefepime with end date 9/27.  Patient refuses home IV Abx with HH or SNF placement, will continue Abx course inpatient despite patient being medically cleared for discharge.    Plan:     Pseudomonas UTI Resistant to PO Abx  - Urine cx from 8/15 with Pseudomonas aeurginosa.  Given Bactrim without improvement in dysuria.  - Urine cx from 9/5 with Pseudomonas aeruginosa resistant to PO Abx  - CT Renal Stone 9/13 without renal or ureter calculi  - Patient sent from urology clinic to ED for IV Abx treatment  - Afebrile, no leukocytosis, lactic acid WNL.  No systemic sx.  - UA suggestive of active UTI  - No CVA tenderness bilaterally  - Started on IV cefepime  - blood cultures NGTD  - Repeat urine culture 9/20 with Pseudomonas aeruginosa sensitive to cefepime  Plan:  - Continue IV cefepime. Plan for 7 day course total with end date on 9/27/24  -  and primary team discussed discharging patient on home IV Abx with .  Patient was educated on home IV Abx and stated he could not do it. He also refused SNF placement as well. Patient wishes to complete IV Abx course while inpatient.  - Patient is medically ready for discharge     Stage 1 Prostate Cancer  Bladder Outlet Obstruction  - s/p TURP in 2021 due to bladder outlet obstruction  - pathology of TURP with prostate cancer  - not on any treatment, follows with urologist   - PSAs have been stable  - Continue home finasteride 5mg and tamsulosin 0.4mg     Bladder Diverticula with Calculi  - CT Renal Stone on 9/13 with large lateral bladder diverticula containing 2 small calculi  -  No renal or ureter calculi  - Originally scheduled for cystoscopy on 9/18, unable to complete until resolve UTI  - Follow up with urologist after discharge     Hypertension  - Hypertensive on arrival   - Patient states he did not take HTN meds today  - Continue home losartan 100mg     Normocytic Anemia  - Hgb of 11.7 on admission  - baseline Hgb of 12.1  - Iron Panel normal, except for low Iron Sat.  B12/Folate wnl  - discontinued daily labs     Thrombocytopenia  - Plt of 124 on admission  - Last CBC 2 years ago with Plt of 140  - Unclear etiology, CTM  - follow up outpatient for repeat CBC on discharge     Hyperlipidemia  - Hx of hyperlipidemia with being prescribed a statin  - No lipid profile on record  - Does not take a statin currently  - outpatient follow up with PCP on discharge for initiation        Diet: Regular  Code: Full  DVT ppx:  Lovenox  Dispo: Friday following IV Abx course     Cruzito Dear, DO  [facility] Internal Medicine HO-I    [facility] Medicine Hospitalist Pager numbers:   [facility] Hospitalist Medicine Team A (Berto/Grupo): [phone]  [facility] Hospitalist Medicine Team B (Yuridia/Ronni):  [phone]

## 2024-09-26 LAB
BACTERIA BLD CULT: NORMAL
BACTERIA BLD CULT: NORMAL

## 2024-09-26 PROCEDURE — A4216 STERILE WATER/SALINE, 10 ML: HCPCS | Performed by: STUDENT IN AN ORGANIZED HEALTH CARE EDUCATION/TRAINING PROGRAM

## 2024-09-26 PROCEDURE — 63600175 PHARM REV CODE 636 W HCPCS: Performed by: STUDENT IN AN ORGANIZED HEALTH CARE EDUCATION/TRAINING PROGRAM

## 2024-09-26 PROCEDURE — 11000001 HC ACUTE MED/SURG PRIVATE ROOM

## 2024-09-26 PROCEDURE — 25000003 PHARM REV CODE 250: Performed by: STUDENT IN AN ORGANIZED HEALTH CARE EDUCATION/TRAINING PROGRAM

## 2024-09-26 PROCEDURE — 25000003 PHARM REV CODE 250

## 2024-09-26 RX ADMIN — TAMSULOSIN HYDROCHLORIDE 0.4 MG: 0.4 CAPSULE ORAL at 08:09

## 2024-09-26 RX ADMIN — ENOXAPARIN SODIUM 40 MG: 40 INJECTION SUBCUTANEOUS at 05:09

## 2024-09-26 RX ADMIN — LOSARTAN POTASSIUM 100 MG: 50 TABLET, FILM COATED ORAL at 08:09

## 2024-09-26 RX ADMIN — SODIUM CHLORIDE, PRESERVATIVE FREE 10 ML: 5 INJECTION INTRAVENOUS at 03:09

## 2024-09-26 RX ADMIN — CEFEPIME 2 G: 2 INJECTION, POWDER, FOR SOLUTION INTRAVENOUS at 03:09

## 2024-09-26 RX ADMIN — CYANOCOBALAMIN TAB 1000 MCG 1000 MCG: 1000 TAB at 08:09

## 2024-09-26 RX ADMIN — SODIUM CHLORIDE, PRESERVATIVE FREE 10 ML: 5 INJECTION INTRAVENOUS at 05:09

## 2024-09-26 RX ADMIN — FINASTERIDE 5 MG: 5 TABLET, FILM COATED ORAL at 08:09

## 2024-09-26 RX ADMIN — SODIUM CHLORIDE, PRESERVATIVE FREE 10 ML: 5 INJECTION INTRAVENOUS at 11:09

## 2024-09-26 NOTE — PROGRESS NOTES
[facility] Medicine Progress Note    Primary Team: [facility] Hospitalist Team A  Attending Physician: Dot Lombardo MD  Resident: Dr. Fitzpatrick  Intern: Dr. Rodriguez    Date of Admit: 2024     Chief Complaint:   Chief Complaint   Patient presents with    MD referral     Patient reports to the ED complaining of dysuria. Patient was seen by MD and told to report to the ED for IV Abx due to UA results. Patient ambulatory to triage, NAD noted.       Subjective/Interval Events:      No acute events overnight and without any complaints this morning. Patient standing at bedside.  On day 6 of cefepime for UTI. Plan to discharge home tomorrow after AM dose.    Objective    Objective   Last 24 Hour Vital Signs:  BP  Min: 119/60  Max: 160/67  Temp  Av.1 °F (36.7 °C)  Min: 97.4 °F (36.3 °C)  Max: 98.4 °F (36.9 °C)  Pulse  Av  Min: 65  Max: 77  Resp  Av.3  Min: 16  Max: 20  SpO2  Av.8 %  Min: 96 %  Max: 100 %  Weight  Av.6 kg (153 lb 7 oz)  Min: 69.6 kg (153 lb 7 oz)  Max: 69.6 kg (153 lb 7 oz)    Intake/Output Summary (Last 24 hours) at 2024 0705  Last data filed at 2024 0531  Gross per 24 hour   Intake 694.61 ml   Output 2000 ml   Net -1305.39 ml       Physical Examination:  General:  standing at bedside in no acute distress, conversational  Cardiovascular:  RRR, no murmurs appreciated  Respiratory:  clear to auscultation bilaterally, nonlabored breathing  Abdomen:  soft, non-tender, non-distended  :  negative CVA tenderness bilaterally  Extremities:  no pitting edema  Skin:  no rashes or ulcers noted  Neurologic:  Aox4, conversational, moving all extremities, ambulates    Laboratory:  Recent Labs   Lab 24  0608 24  0545 24  0551 24  0446   WBC 4.09 4.65 5.79  --    HGB 12.1* 12.7* 13.9*  --    HCT 36.7* 38.6* 43.7  --    * 121* 156  --    MCV 91 91 93  --    RDW 13.4 13.4 13.4  --     139  --  139   K 3.9 4.3  --  4.0    109  --  108   CO2 26 23   --  22*   BUN 13 15  --  18   CREATININE 1.0 1.0  --  1.0   * 113*  --  114*   PROT 6.8 7.0  --  6.8   ALBUMIN 3.4* 3.5  --  3.4*   BILITOT 0.5 0.5  --  0.4   AST 16 17  --  18   ALKPHOS 77 75  --  74   ALT 11 9*  --  10       Microbiology:  Microbiology Results (last 7 days)       Procedure Component Value Units Date/Time    Blood culture #1 **CANNOT BE ORDERED STAT** [[id number]] Collected: 09/20/24 1618    Order Status: Completed Specimen: Blood from Peripheral, Antecubital, Right Updated: 09/26/24 0612     Blood Culture, Routine No growth after 5 days.    Blood culture #2 **CANNOT BE ORDERED STAT** [[id number]] Collected: 09/20/24 1551    Order Status: Completed Specimen: Blood from Peripheral, Forearm, Left Updated: 09/26/24 0612     Blood Culture, Routine No growth after 5 days.    Urine culture [[id number]]  (Abnormal)  (Susceptibility) Collected: 09/20/24 1502    Order Status: Completed Specimen: Urine Updated: 09/24/24 1042     Urine Culture, Routine PSEUDOMONAS AERUGINOSA  50,000 - 99,999 cfu/ml      Narrative:      Specimen Source->Urine             Imaging:  No orders to display        Current Medications:     Scheduled:   ceFEPime IV (PEDS and ADULTS)  2 g Intravenous Q12H    cyanocobalamin  1,000 mcg Oral Daily    enoxparin  40 mg Subcutaneous Daily    finasteride  5 mg Oral Daily    losartan  100 mg Oral Daily    sodium chloride 0.9%  10 mL Intravenous Q6H    tamsulosin  0.4 mg Oral Daily         Infusions:       PRN:    Current Facility-Administered Medications:     acetaminophen, 650 mg, Oral, Q4H PRN    influenza (adjuvanted), 0.5 mL, Intramuscular, Prior to discharge    melatonin, 6 mg, Oral, Nightly PRN    sodium chloride 0.9%, 10 mL, Intravenous, Q12H PRN    Flushing PICC/Midline Protocol, , , Until Discontinued **AND** sodium chloride 0.9%, 10 mL, Intravenous, Q6H **AND** sodium chloride 0.9%, 10 mL, Intravenous, PRN       Assessment:     Magedio Padua is a 84 y.o. male with PMHx of  prostate cancer, bladder outlet obstruction, HTN, HLD, and anemia who presented to [facility] on 9/20/2024 for IV Abx treatment of UTI per urologist.  Patient is admitted to [facility] for IV treatment of Pseudomonas UTI.  Patient with Pseudomonas resistant to PO Abx.  Will start IV cefepime with end date 9/27.  Patient refuses home IV Abx with HH or SNF placement, will continue Abx course inpatient despite patient being medically cleared for discharge.    Plan:     Pseudomonas UTI Resistant to PO Abx  - Urine cx from 8/15 with Pseudomonas aeurginosa.  Given Bactrim without improvement in dysuria.  - Urine cx from 9/5 with Pseudomonas aeruginosa resistant to PO Abx  - CT Renal Stone 9/13 without renal or ureter calculi  - Patient sent from urology clinic to ED for IV Abx treatment  - Afebrile, no leukocytosis, lactic acid WNL.  No systemic sx.  - UA suggestive of active UTI  - No CVA tenderness bilaterally  - Started on IV cefepime  - blood cultures NGTD  - Repeat urine culture 9/20 with Pseudomonas aeruginosa sensitive to cefepime  Plan:  - Continue IV cefepime. Plan for 7 day course total with end date on 9/27/24  - CM and primary team discussed discharging patient on home IV Abx with .  Patient was educated on home IV Abx and stated he could not do it. He also refused SNF placement as well. Patient wishes to complete IV Abx course while inpatient.  - Patient is medically ready for discharge.  Will give final dose Friday morning and then discharge home.     Stage 1 Prostate Cancer  Bladder Outlet Obstruction  - s/p TURP in 2021 due to bladder outlet obstruction  - pathology of TURP with prostate cancer  - not on any treatment, follows with urologist   - PSAs have been stable  - Continue home finasteride 5mg and tamsulosin 0.4mg     Bladder Diverticula with Calculi  - CT Renal Stone on 9/13 with large lateral bladder diverticula containing 2 small calculi  - No renal or ureter calculi  - Originally  scheduled for cystoscopy on 9/18, unable to complete until resolve UTI  - Follow up with urologist after discharge     Hypertension  - Hypertensive on arrival   - Patient states he did not take HTN meds today  - Continue home losartan 100mg     Normocytic Anemia  - Hgb of 11.7 on admission  - baseline Hgb of 12.1  - Iron Panel normal, except for low Iron Sat.  B12/Folate wnl  - discontinued daily labs     Thrombocytopenia  - Plt of 124 on admission  - Last CBC 2 years ago with Plt of 140  - Unclear etiology, CTM  - follow up outpatient for repeat CBC on discharge     Hyperlipidemia  - Hx of hyperlipidemia with being prescribed a statin  - No lipid profile on record  - Does not take a statin currently  - outpatient follow up with PCP on discharge for initiation        Diet: Regular  Code: Full  DVT ppx:  Lovenox  Dispo: Friday following morning Abx dose     Cruzito Dear, DO  [facility] Internal Medicine HO-I    [facility] Medicine Hospitalist Pager numbers:   [facility] Hospitalist Medicine Team A (Berto/Grupo): [phone]  [facility] Hospitalist Medicine Team B (Yuridia/Ronni):  [phone]

## 2024-09-26 NOTE — PLAN OF CARE
Problem: Adult Inpatient Plan of Care  Goal: Plan of Care Review  Outcome: Progressing     Problem: Fall Injury Risk  Goal: Absence of Fall and Fall-Related Injury  Outcome: Progressing     Problem: UTI (Urinary Tract Infection)  Goal: Improved Infection Symptoms  Outcome: Progressing     Problem: Comorbidity Management  Goal: Blood Pressure in Desired Range  Outcome: Progressing     Problem: Infection  Goal: Absence of Infection Signs and Symptoms  Outcome: Progressing  No c/o pain this shift. Pt ambulatory to bathroom. IV abx received.

## 2024-09-26 NOTE — PLAN OF CARE
Problem: Fall Injury Risk  Goal: Absence of Fall and Fall-Related Injury  Outcome: Progressing     Problem: Adult Inpatient Plan of Care  Goal: Plan of Care Review  Flowsheets (Taken 9/26/2024 1414)  Plan of Care Reviewed With: patient

## 2024-09-26 NOTE — PROGRESS NOTES
"[facility]           Pharmacy  Admission Medication Reconciliation     Based on information gathered for medication list, you may go to "Admission" then "Reconcile Home Medications" tabs to review and/or act upon those items.     The home medication list has been updated by the Pharmacy department.   Please read ALL comments highlighted in red.   Please address this information as you see fit.    Feel free to contact us if you have any questions or require assistance.    Home medication list has been compared to current inpatient medications. Please review the following discrepancies noted below:      Patient reports STILL TAKING the following medication(s) which was not ordered upon admit  Combigan    Feel free to contact us if you have any questions or require assistance.    Leanna Carrington, PharmD  [phone]        "

## 2024-09-27 VITALS
HEART RATE: 73 BPM | SYSTOLIC BLOOD PRESSURE: 141 MMHG | HEIGHT: 70 IN | RESPIRATION RATE: 18 BRPM | WEIGHT: 156.5 LBS | DIASTOLIC BLOOD PRESSURE: 63 MMHG | OXYGEN SATURATION: 98 % | TEMPERATURE: 98 F | BODY MASS INDEX: 22.41 KG/M2

## 2024-09-27 PROCEDURE — 63600175 PHARM REV CODE 636 W HCPCS: Performed by: STUDENT IN AN ORGANIZED HEALTH CARE EDUCATION/TRAINING PROGRAM

## 2024-09-27 PROCEDURE — A4216 STERILE WATER/SALINE, 10 ML: HCPCS | Performed by: STUDENT IN AN ORGANIZED HEALTH CARE EDUCATION/TRAINING PROGRAM

## 2024-09-27 PROCEDURE — 25000003 PHARM REV CODE 250: Performed by: STUDENT IN AN ORGANIZED HEALTH CARE EDUCATION/TRAINING PROGRAM

## 2024-09-27 PROCEDURE — 25000003 PHARM REV CODE 250

## 2024-09-27 RX ADMIN — FINASTERIDE 5 MG: 5 TABLET, FILM COATED ORAL at 09:09

## 2024-09-27 RX ADMIN — CEFEPIME 2 G: 2 INJECTION, POWDER, FOR SOLUTION INTRAVENOUS at 03:09

## 2024-09-27 RX ADMIN — SODIUM CHLORIDE, PRESERVATIVE FREE 10 ML: 5 INJECTION INTRAVENOUS at 03:09

## 2024-09-27 RX ADMIN — TAMSULOSIN HYDROCHLORIDE 0.4 MG: 0.4 CAPSULE ORAL at 09:09

## 2024-09-27 RX ADMIN — LOSARTAN POTASSIUM 100 MG: 50 TABLET, FILM COATED ORAL at 09:09

## 2024-09-27 RX ADMIN — CYANOCOBALAMIN TAB 1000 MCG 1000 MCG: 1000 TAB at 09:09

## 2024-09-27 NOTE — DISCHARGE SUMMARY
[facility] Medicine Discharge Summary    Primary Team: [facility] Hospitalist Team A  Attending Physician: Dot Lombardo MD  Resident: Dr. Fitzpatrick  Intern: Dr. Rodriguez    Date of Admit: 9/20/2024  Date of Discharge: 9/27/2024    Discharge to: Home   Condition: Stable     Discharge Diagnoses     Patient Active Problem List   Diagnosis    Dark stools    Melena    Gastritis    Screening for colon cancer    Dysuria    Acute cystitis    Chronic anemia    Diverticula, bladder    Pseudomonas urinary tract infection    Bladder outlet obstruction    Primary hypertension    Normocytic anemia       Consultants and Procedures     Consultants:  Consults (From admission, onward)          Status Ordering Provider     Inpatient consult to Midline team  Once        Provider:  (Not yet assigned)    Acknowledged KRAIG SANTIAGO             Procedures: none      Imaging:  No orders to display        Brief History of Present Illness      Magedio Padua is a 84 y.o. male with PMHx of prostate cancer, bladder outlet obstruction, HTN, HLD, and anemia who presented to [facility] on 9/20/2024 for IV Abx treatment of UTI per urologist.     The patient originally developed dysuria a month ago.  The patient went to see urologist on 8/15 due to symptoms and was prescribed 7 day course of Bactrim.  A urine cx was sent off and grew Pseudomonas.  Urology clinic tried to contact patient regarding results, but was unable to reach him.  Patient returned to clinic on 9/5 and was given Bactrim for one month course.  Repeat urine cx with Pseudomonas resistant to PO Abx.  Return visit to urology on 9/20 and was recommended to go to ED for IV Abx management of UTI.     Patient denies fever, chills, nausea, vomiting, or flank pain.  He states he still has dysuria.  He has a history of prostate cancer s/p TURP causing bladder outlet obstruction.  He is supposed to have gotten a cystoscopy on 9/18, but is unable to have it performed due to active  UTI.    Hospital Course By Problem with Pertinent Findings     Pseudomonas UTI Resistant to PO Abx  - Urine cx from 8/15 with Pseudomonas aeurginosa.  Given Bactrim without improvement in dysuria.  - Urine cx from 9/5 with Pseudomonas aeruginosa resistant to PO Abx  - CT Renal Stone 9/13 without renal or ureter calculi  - Patient sent from urology clinic to ED for IV Abx treatment  - Afebrile, no leukocytosis, lactic acid WNL.  No systemic sx.  - UA suggestive of active UTI  - No CVA tenderness bilaterally  - Started on IV cefepime  - blood cultures NGTD  - Repeat urine culture 9/20 with Pseudomonas aeruginosa sensitive to cefepime  - Completed 7 day course of IV cefepime on the morning of 9/27/24 with resolution of dysuria  - Patient to follow up with urologist following discharge     Stage 1 Prostate Cancer  Bladder Outlet Obstruction  - s/p TURP in 2021 due to bladder outlet obstruction  - pathology of TURP with prostate cancer  - not on any treatment, follows with urologist   - PSAs have been stable  - Continued home finasteride 5mg and tamsulosin 0.4mg     Bladder Diverticula with Calculi  - CT Renal Stone on 9/13 with large lateral bladder diverticula containing 2 small calculi  - No renal or ureter calculi  - Originally scheduled for cystoscopy on 9/18, unable to complete until resolve UTI  - UTI treated with 7 day course of IV cefepime  - Follow up with urologist after discharge for cystoscopy     Hypertension  - Hypertensive on arrival   - Patient states he did not take HTN meds today  - Continued home losartan 100mg     Normocytic Anemia  - Hgb of 11.7 on admission  - baseline Hgb of 12.1  - Iron Panel normal, except for low Iron Sat.  B12/Folate wnl     Thrombocytopenia  - Plt of 124 on admission  - Last CBC 2 years ago with Plt of 140  - Unclear etiology   - follow up with PCP for monitoring     Hyperlipidemia  - Hx of hyperlipidemia with being prescribed a statin  - No lipid profile on record  - Does  not take a statin currently    DVT ppx:  Lovenox     Discharge Medications        Medication List        START taking these medications      cyanocobalamin 1000 MCG tablet  Commonly known as: VITAMIN B-12  Take 1 tablet (1,000 mcg total) by mouth once daily.            CONTINUE taking these medications      COMBIGAN 0.2-0.5 % Drop  Generic drug: brimonidine-timoloL     finasteride 5 mg tablet  Commonly known as: PROSCAR     losartan 100 MG tablet  Commonly known as: COZAAR     ofloxacin 0.3 % otic solution  Commonly known as: FLOXIN     tamsulosin 0.4 mg Cap  Commonly known as: FLOMAX            STOP taking these medications      oxyCODONE-acetaminophen 5-325 mg per tablet  Commonly known as: PERCOCET               Where to Get Your Medications        These medications were sent to [facility]/pharmacy #[id number] - [facility] - [address]  [address] [address]      Phone: [phone]   cyanocobalamin 1000 MCG tablet         Discharge Information:     Diet:  Cardiac Diet, low sodium    Physical Activity:  As tolerated             Instructions:  1. Take all medications as prescribed  2. Follow up with urologist and primary care physician following discharge.  3. Return to the hospital or call your primary care physician if any worsening symptoms such as fever, chest pain, shortness of breath.    Follow-Up Appointments:  Future Appointments   Date Time Provider Department Center   10/2/2024  1:00 PM Lizeth Marcial MD [facility]   10/16/2024 10:30 AM Cruzito Hood MD [facility] UROLOGY [place] Dear, DO  [facility] PGY-I

## 2024-09-27 NOTE — PROGRESS NOTES
Discharge orders noted. Additional clinical references attached. Patient's discharge instructions given by bedside RN. Virtual nurse cued into room with Bella  #[id number] and reviewed discharge instructions. Education provided on new medication, diagnosis, and follow-up appointments. Teach back method used. Patient verbalized understanding. All questions answered. Bedside nurse updated on patient status and to request transportation to [facility] when ready.     09/27/24 1145   AVS Confirmation   Discharge instructions and AVS provided to and reviewed with patient and/or significant other. Yes

## 2024-09-27 NOTE — PROGRESS NOTES
[facility] Medicine Progress Note    Primary Team: [facility] Hospitalist Team A  Attending Physician: Dot Lombardo MD  Resident: Dr. Fitzpatrick  Intern: Dr. Rodriguez    Date of Admit: 2024     Chief Complaint:   Chief Complaint   Patient presents with    MD referral     Patient reports to the ED complaining of dysuria. Patient was seen by MD and told to report to the ED for IV Abx due to UA results. Patient ambulatory to triage, NAD noted.       Subjective/Interval Events:      No acute events overnight and without any complaints this morning. Sitting at bedside eating breakfast.  Receiving final dose of IV cefepime for UTI. Plan to discharge home today after dose.  Denies any urinary sx.    Objective    Objective   Last 24 Hour Vital Signs:  BP  Min: 112/60  Max: 144/65  Temp  Av.6 °F (36.4 °C)  Min: 97.5 °F (36.4 °C)  Max: 97.8 °F (36.6 °C)  Pulse  Av.4  Min: 64  Max: 108  Resp  Av.8  Min: 17  Max: 20  SpO2  Av.2 %  Min: 96 %  Max: 98 %  Weight  Av kg (156 lb 8.4 oz)  Min: 71 kg (156 lb 8.4 oz)  Max: 71 kg (156 lb 8.4 oz)    Intake/Output Summary (Last 24 hours) at 2024 0707  Last data filed at 2024 0445  Gross per 24 hour   Intake 673.59 ml   Output 1551 ml   Net -877.41 ml       Physical Examination:  General:  sitting at bedside in no acute distress, conversational  Cardiovascular:  RRR, no murmurs appreciated  Respiratory:  clear to auscultation bilaterally, nonlabored breathing  Abdomen:  soft, non-tender, non-distended  :  negative CVA tenderness bilaterally  Extremities:  no pitting edema  Skin:  no rashes or ulcers noted  Neurologic:  Aox4, conversational, moving all extremities, ambulates    Laboratory:  Recent Labs   Lab 24  0608 24  0545 24  0551 24  0446   WBC 4.09 4.65 5.79  --    HGB 12.1* 12.7* 13.9*  --    HCT 36.7* 38.6* 43.7  --    * 121* 156  --    MCV 91 91 93  --    RDW 13.4 13.4 13.4  --     139  --  139   K 3.9 4.3  --   4.0    109  --  108   CO2 26 23  --  22*   BUN 13 15  --  18   CREATININE 1.0 1.0  --  1.0   * 113*  --  114*   PROT 6.8 7.0  --  6.8   ALBUMIN 3.4* 3.5  --  3.4*   BILITOT 0.5 0.5  --  0.4   AST 16 17  --  18   ALKPHOS 77 75  --  74   ALT 11 9*  --  10       Microbiology:  Microbiology Results (last 7 days)       Procedure Component Value Units Date/Time    Blood culture #1 **CANNOT BE ORDERED STAT** [[id number]] Collected: 09/20/24 1618    Order Status: Completed Specimen: Blood from Peripheral, Antecubital, Right Updated: 09/26/24 0612     Blood Culture, Routine No growth after 5 days.    Blood culture #2 **CANNOT BE ORDERED STAT** [[id number]] Collected: 09/20/24 1551    Order Status: Completed Specimen: Blood from Peripheral, Forearm, Left Updated: 09/26/24 0612     Blood Culture, Routine No growth after 5 days.    Urine culture [[id number]]  (Abnormal)  (Susceptibility) Collected: 09/20/24 1502    Order Status: Completed Specimen: Urine Updated: 09/24/24 1042     Urine Culture, Routine PSEUDOMONAS AERUGINOSA  50,000 - 99,999 cfu/ml      Narrative:      Specimen Source->Urine             Imaging:  No orders to display        Current Medications:     Scheduled:   ceFEPime IV (PEDS and ADULTS)  2 g Intravenous Q12H    cyanocobalamin  1,000 mcg Oral Daily    enoxparin  40 mg Subcutaneous Daily    finasteride  5 mg Oral Daily    losartan  100 mg Oral Daily    sodium chloride 0.9%  10 mL Intravenous Q6H    tamsulosin  0.4 mg Oral Daily         Infusions:       PRN:    Current Facility-Administered Medications:     acetaminophen, 650 mg, Oral, Q4H PRN    influenza (adjuvanted), 0.5 mL, Intramuscular, Prior to discharge    melatonin, 6 mg, Oral, Nightly PRN    sodium chloride 0.9%, 10 mL, Intravenous, Q12H PRN    Flushing PICC/Midline Protocol, , , Until Discontinued **AND** sodium chloride 0.9%, 10 mL, Intravenous, Q6H **AND** sodium chloride 0.9%, 10 mL, Intravenous, PRN       Assessment:     Aniyah  Padua is a 84 y.o. male with PMHx of prostate cancer, bladder outlet obstruction, HTN, HLD, and anemia who presented to [facility] on 9/20/2024 for IV Abx treatment of UTI per urologist.  Patient is admitted to [facility] for IV treatment of Pseudomonas UTI.  Patient with Pseudomonas resistant to PO Abx.  Will start IV cefepime with end date 9/27.  Patient refused home IV Abx with HH or SNF placement, will continue Abx course inpatient despite patient being medically cleared for discharge.    Plan:     Pseudomonas UTI Resistant to PO Abx  - Urine cx from 8/15 with Pseudomonas aeurginosa.  Given Bactrim without improvement in dysuria.  - Urine cx from 9/5 with Pseudomonas aeruginosa resistant to PO Abx  - CT Renal Stone 9/13 without renal or ureter calculi  - Patient sent from urology clinic to ED for IV Abx treatment  - Afebrile, no leukocytosis, lactic acid WNL.  No systemic sx.  - UA suggestive of active UTI  - No CVA tenderness bilaterally  - Started on IV cefepime  - blood cultures NGTD  - Repeat urine culture 9/20 with Pseudomonas aeruginosa sensitive to cefepime  Plan:  - Will complete final dose of IV cefepime this morning (complete 7 day course) and discharge home after  - Follow up with urologist following discharge     Stage 1 Prostate Cancer  Bladder Outlet Obstruction  - s/p TURP in 2021 due to bladder outlet obstruction  - pathology of TURP with prostate cancer  - not on any treatment, follows with urologist   - PSAs have been stable  - Continue home finasteride 5mg and tamsulosin 0.4mg     Bladder Diverticula with Calculi  - CT Renal Stone on 9/13 with large lateral bladder diverticula containing 2 small calculi  - No renal or ureter calculi  - Originally scheduled for cystoscopy on 9/18, unable to complete until resolve UTI  - Follow up with urologist after discharge for cystoscopy     Hypertension  - Hypertensive on arrival   - Patient states he did not take HTN meds today  - Continue home  losartan 100mg     Normocytic Anemia  - Hgb of 11.7 on admission  - baseline Hgb of 12.1  - Iron Panel normal, except for low Iron Sat.  B12/Folate wnl  - discontinued daily labs     Thrombocytopenia  - Plt of 124 on admission  - Last CBC 2 years ago with Plt of 140  - Unclear etiology, CTM  - follow up outpatient for repeat CBC on discharge     Hyperlipidemia  - Hx of hyperlipidemia with being prescribed a statin  - No lipid profile on record  - Does not take a statin currently  - outpatient follow up with PCP on discharge for initiation        Diet: Regular  Code: Full  DVT ppx:  Lovenox  Dispo: today after morning Abx dose     Cruzito Dear, DO  [facility] HO-I    [facility] Medicine Hospitalist Pager numbers:   hospitals Hospitalist Medicine Team A (Berto/Grupo): [phone]  hospitals Hospitalist Medicine Team B (Yuridia/Ronni):  [phone]

## 2024-09-27 NOTE — PLAN OF CARE
SW met with pt at bedside this AM to complete final d/c assessment. Pt reported that he will have his friend help transport him home later today. Pt has f/u appt listed on AVS. Pt did not have any additional questions or concerns for sw at this time. Rounds completed on pt. All questions addressed. Bedside nurse to discuss d/c medications. Discussed importance to attend all f/u appts and take medications as prescribed. Verbalized understanding. Case Management to sign off.     KEISHA Galarza  [phone]    Future Appointments   Date Time Provider Department Center   10/2/2024  1:00 PM Lizeth Marcial MD [facility] IMPRI [facility]   10/16/2024 10:30 AM Cruzito Hood MD [facility] UROLOGY [facility]        09/27/24 0853   Final Note   Assessment Type Final Discharge Note   Anticipated Discharge Disposition Home   Post-Acute Status   Discharge Delays None known at this time

## 2024-09-27 NOTE — PLAN OF CARE
Problem: Adult Inpatient Plan of Care  Goal: Plan of Care Review  Outcome: Progressing     Problem: Fall Injury Risk  Goal: Absence of Fall and Fall-Related Injury  Outcome: Progressing     Problem: UTI (Urinary Tract Infection)  Goal: Improved Infection Symptoms  Outcome: Progressing     Problem: Comorbidity Management  Goal: Blood Pressure in Desired Range  Outcome: Progressing     Problem: Infection  Goal: Absence of Infection Signs and Symptoms  Outcome: Progressing   No c/o pain, pt received IV abx, Pt up to bathroom independently.

## 2024-09-27 NOTE — PLAN OF CARE
Problem: Fall Injury Risk  Goal: Absence of Fall and Fall-Related Injury  Outcome: Progressing     Problem: Adult Inpatient Plan of Care  Goal: Plan of Care Review  Flowsheets (Taken 9/27/2024 8254)  Plan of Care Reviewed With: patient

## 2024-10-09 ENCOUNTER — LAB VISIT (OUTPATIENT)
Dept: LAB | Facility: HOSPITAL | Age: 84
End: 2024-10-09
Attending: INTERNAL MEDICINE
Payer: MEDICARE

## 2024-10-09 ENCOUNTER — OFFICE VISIT (OUTPATIENT)
Dept: PRIMARY CARE CLINIC | Facility: CLINIC | Age: 84
End: 2024-10-09
Payer: MEDICARE

## 2024-10-09 VITALS
HEART RATE: 59 BPM | HEIGHT: 70 IN | DIASTOLIC BLOOD PRESSURE: 72 MMHG | BODY MASS INDEX: 23.1 KG/M2 | WEIGHT: 161.38 LBS | SYSTOLIC BLOOD PRESSURE: 159 MMHG | OXYGEN SATURATION: 99 %

## 2024-10-09 DIAGNOSIS — C61 PROSTATE CANCER: ICD-10-CM

## 2024-10-09 DIAGNOSIS — I10 PRIMARY HYPERTENSION: ICD-10-CM

## 2024-10-09 DIAGNOSIS — N39.0 URINARY TRACT INFECTION DUE TO PSEUDOMONAS AERUGINOSA: ICD-10-CM

## 2024-10-09 DIAGNOSIS — N39.0 URINARY TRACT INFECTION DUE TO PSEUDOMONAS AERUGINOSA: Primary | ICD-10-CM

## 2024-10-09 DIAGNOSIS — B96.5 URINARY TRACT INFECTION DUE TO PSEUDOMONAS AERUGINOSA: Primary | ICD-10-CM

## 2024-10-09 DIAGNOSIS — B96.5 URINARY TRACT INFECTION DUE TO PSEUDOMONAS AERUGINOSA: ICD-10-CM

## 2024-10-09 PROBLEM — N30.00 ACUTE CYSTITIS: Status: RESOLVED | Noted: 2024-09-20 | Resolved: 2024-10-09

## 2024-10-09 LAB
BACTERIA #/AREA URNS HPF: ABNORMAL /HPF
BILIRUB UR QL STRIP: NEGATIVE
CLARITY UR: CLEAR
COLOR UR: COLORLESS
GLUCOSE UR QL STRIP: NEGATIVE
HGB UR QL STRIP: NEGATIVE
KETONES UR QL STRIP: NEGATIVE
LEUKOCYTE ESTERASE UR QL STRIP: ABNORMAL
MICROSCOPIC COMMENT: ABNORMAL
NITRITE UR QL STRIP: NEGATIVE
PH UR STRIP: 7 [PH] (ref 5–8)
PROT UR QL STRIP: NEGATIVE
RBC #/AREA URNS HPF: 1 /HPF (ref 0–4)
SP GR UR STRIP: 1.01 (ref 1–1.03)
URN SPEC COLLECT METH UR: ABNORMAL
UROBILINOGEN UR STRIP-ACNC: NEGATIVE EU/DL
WBC #/AREA URNS HPF: 21 /HPF (ref 0–5)

## 2024-10-09 PROCEDURE — 81000 URINALYSIS NONAUTO W/SCOPE: CPT | Performed by: INTERNAL MEDICINE

## 2024-10-09 PROCEDURE — 87086 URINE CULTURE/COLONY COUNT: CPT | Performed by: INTERNAL MEDICINE

## 2024-10-09 PROCEDURE — 87088 URINE BACTERIA CULTURE: CPT | Performed by: INTERNAL MEDICINE

## 2024-10-09 PROCEDURE — 99999 PR PBB SHADOW E&M-EST. PATIENT-LVL III: CPT | Mod: PBBFAC,,, | Performed by: INTERNAL MEDICINE

## 2024-10-09 PROCEDURE — 87186 SC STD MICRODIL/AGAR DIL: CPT | Performed by: INTERNAL MEDICINE

## 2024-10-09 RX ORDER — LOSARTAN POTASSIUM 100 MG/1
100 TABLET ORAL DAILY
Qty: 90 TABLET | Refills: 3 | Status: SHIPPED | OUTPATIENT
Start: 2024-10-09

## 2024-10-09 NOTE — PROGRESS NOTES
[facility]   New Visit Progress Note   Recent Hospital Discharge     PRESENTING HISTORY     Chief Complaint/Reason for Admission:  Follow up Hospital Discharge   PCP: John Conway MD    History of Present Illness:  Mr. Magedio Padua is a 84 y.o. male who was recently admitted to the hospital.    [facility] Medicine Discharge Summary  Primary Team: [facility] Hospitalist Team A  Attending Physician: Dot Lombardo MD  Resident: Dr. Fitzpatrick  Intern: Dr. Rodriguez  Date of Admit: 9/20/2024  Date of Discharge: 9/27/2024  Discharge to: Home   Condition: Stable   ___________________________________________________________________    Today:  [facility] for [facility] follow up.  Recently hospitalized for management of Pseudomonas aeruginosa UTI, resistant to oral ABX and patient already failed attempted outpatient therapy with Bactrim.   Condition complicated hx  bladder outlet obstruction, prostate CA s/p TURP- lost to Urology follow up in 2022 before returning to Urology team 8/2024 with UTI.   Patient admitted to [facility] Medicine service.  Blood Cx NGTD.  Urine culture repeated; continued growth of known Pseudomonas.   Completed 7 day course Cefepime.  Discharged to home.    Patient unaccompanied today.  Ambulatory and independent with ADL's.  Did not bring medication bottles for review.  Not taking prescribed Losartan.  Still having burning with voiding- worse with AM void.   No Fever, chills, or flank pain. No NV.     Review of Systems  General ROS: negative for chills, fever or weight loss  Psychological ROS: negative for hallucination, depression or suicidal ideation  Ophthalmic ROS: negative for blurry vision, photophobia or eye pain  ENT ROS: negative for epistaxis, sore throat or rhinorrhea  Respiratory ROS: no cough, shortness of breath, or wheezing  Cardiovascular ROS: no chest pain or dyspnea on exertion  Gastrointestinal ROS: no abdominal pain, change in bowel habits, or black/  bloody stools  Genito-Urinary ROS: + dysuria  Musculoskeletal ROS: negative for gait disturbance or muscular weakness  Neurological ROS: no syncope or seizures; no ataxia  Dermatological ROS: negative for pruritis, rash and jaundice      PAST HISTORY:     Past Medical History:   Diagnosis Date    Abdominal pain     High cholesterol     Pseudomonas urinary tract infection 2024    Suprapubic pain, acute 08/15/2024       Past Surgical History:   Procedure Laterality Date    COLONOSCOPY N/A 2016    Procedure: History of melena/Colonoscopy;  Surgeon: Alejandro Borrero MD;  Location: [facility];  Service: Endoscopy;  Laterality: N/A;    CYSTOSCOPY WITH URODYNAMIC TESTING N/A 2021    Procedure: CYSTOSCOPY, WITH URODYNAMIC TESTING FLOUROSCOPIC;  Surgeon: Palmer Mendoza MD;  Location: [address];  Service: Urology;  Laterality: N/A;  1HR    TRANSURETHRAL RESECTION OF PROSTATE N/A 2021    Procedure: TURP (TRANSURETHRAL RESECTION OF PROSTATE);  Surgeon: Jeanna Cardona MD;  Location: [facility];  Service: Urology;  Laterality: N/A;       No family history on file.      MEDICATIONS & ALLERGIES:     Current Outpatient Medications on File Prior to Visit   Medication Sig Dispense Refill    COMBIGAN 0.2-0.5 % Drop Place 1 drop into the left eye 2 (two) times a day.      cyanocobalamin (VITAMIN B-12) 1000 MCG tablet Take 1 tablet (1,000 mcg total) by mouth once daily. 90 tablet 0    finasteride (PROSCAR) 5 mg tablet Take 5 mg by mouth once daily.      losartan (COZAAR) 100 MG tablet Take 100 mg by mouth.      ofloxacin (FLOXIN) 0.3 % otic solution SMARTSI Drop(s) Right Ear Twice Daily      tamsulosin (FLOMAX) 0.4 mg Cap Take 0.4 mg by mouth once daily.       No current facility-administered medications on file prior to visit.        Review of patient's allergies indicates:  No Known Allergies    OBJECTIVE:     Vital Signs:  BP (!) 159/72 (BP Location: Left arm, Patient Position: Sitting)   Pulse (!) 59   " Ht 5' 10" (1.778 m)   Wt 73.2 kg (161 lb 6 oz)   SpO2 99%   BMI 23.16 kg/m²   Wt Readings from Last 3 Encounters:   10/09/24 0854 73.2 kg (161 lb 6 oz)   09/26/24 2340 71 kg (156 lb 8.4 oz)   09/26/24 0358 69.6 kg (153 lb 7 oz)   09/21/24 2324 69.5 kg (153 lb 3.5 oz)   09/20/24 1951 71.1 kg (156 lb 12 oz)   09/20/24 1430 73 kg (161 lb)   09/20/24 1342 73.1 kg (161 lb 4.3 oz)     Body mass index is 23.16 kg/m².        Physical Exam:  BP (!) 159/72 (BP Location: Left arm, Patient Position: Sitting)   Pulse (!) 59   Ht 5' 10" (1.778 m)   Wt 73.2 kg (161 lb 6 oz)   SpO2 99%   BMI 23.16 kg/m²   General appearance: alert, cooperative, no distress  Constitutional:Oriented to person, place, and time  + appears well-developed and well-nourished.   HEENT: Normocephalic, atraumatic, neck symmetrical, no nasal discharge   Eyes: conjunctivae/corneas clear, PERRL, EOM's intact  Lungs: clear to auscultation bilaterally, no dullness to percussion bilaterally  Heart: regular rate and rhythm without rub; no displacement of the PMI   Abdomen: soft, non-tender; bowel sounds normoactive; no organomegaly  Extremities: extremities symmetric; no clubbing, cyanosis, or edema  Integument: Skin color, texture, turgor normal; no rashes; hair distrubution normal  Neurologic: Alert and oriented X 3, normal strength, normal coordination and gait  Psychiatric: no pressured speech; normal affect; no evidence of impaired cognition     Laboratory  Lab Results   Component Value Date    WBC 5.79 09/23/2024    HGB 13.9 (L) 09/23/2024    HCT 43.7 09/23/2024    MCV 93 09/23/2024     09/23/2024     BMP  Lab Results   Component Value Date     09/24/2024    K 4.0 09/24/2024     09/24/2024    CO2 22 (L) 09/24/2024    BUN 18 09/24/2024    CREATININE 1.0 09/24/2024    CALCIUM 8.7 09/24/2024    ANIONGAP 9 09/24/2024    EGFRNORACEVR >60 09/24/2024     Lab Results   Component Value Date    ALT 10 09/24/2024    AST 18 09/24/2024    " "ALKPHOS 74 09/24/2024    BILITOT 0.4 09/24/2024     No results found for: "INR", "PROTIME"  No results found for: "HGBA1C"    Diagnostic Results:    CT Renal Stone Study 9/5/24:  There are large lateral bladder diverticula.  The left bladder diverticulum contains 2 small calculi.  Kidneys ureters bladder and the right bladder diverticulum no calculi seen.  Small hiatal hernia.  No renal or ureter calculi seen.    ASSESSMENT & PLAN:     Urinary tract infection due to Pseudomonas aeruginosa  - recent hospitalization as above  - completed 7 day course Cefepime while hospitalized  - has ongoing dysuria - will repeat UA today  - see Urology 10/16/24   -     Urinalysis, Reflex to Urine Culture Urine, Clean Catch; Future; Expected date: 10/09/2024    Prostate cancer  - s/p TURP 12/2021 then lost to Urology follow up in 2022 before returning to Urology 8/2024 with UTI  - see Urology 10/16/24    Primary hypertension  - restart Losartan   -     losartan (COZAAR) 100 MG tablet; Take 1 tablet (100 mg total) by mouth once daily.  Dispense: 90 tablet; Refill: 3    Patient will be released from hospital follow up clinic.  Urology follow up 10/16/24.  Records will be shared with PCP, Dr John Conway, for coordination of care.       Instructions for the patient:      Scheduled Follow-up :  Future Appointments   Date Time Provider Department Center   10/9/2024  9:50 AM APPOINTMENT LAB, [facility] LAB [facility]   10/16/2024 10:30 AM Cruzito Hood MD [facility] UROLOGY [facility]       Post Visit Medication List:     Medication List            Accurate as of October 9, 2024  9:36 AM. If you have any questions, ask your nurse or doctor.                CHANGE how you take these medications      losartan 100 MG tablet  Commonly known as: COZAAR  Take 1 tablet (100 mg total) by mouth once daily.  What changed: when to take this  Changed by: Lizeth Marcial MD            CONTINUE taking these medications      COMBIGAN 0.2-0.5 " % Drop  Generic drug: brimonidine-timoloL     cyanocobalamin 1000 MCG tablet  Commonly known as: VITAMIN B-12  Take 1 tablet (1,000 mcg total) by mouth once daily.     finasteride 5 mg tablet  Commonly known as: PROSCAR     ofloxacin 0.3 % otic solution  Commonly known as: FLOXIN     tamsulosin 0.4 mg Cap  Commonly known as: FLOMAX               Where to Get Your Medications        These medications were sent to [facility]/pharmacy #[id number] - [place] - [address]  [address] [address]      Phone: [phone]   losartan 100 MG tablet         Signing Physician:  Lizeth Marcial MD

## 2024-10-13 LAB — BACTERIA UR CULT: ABNORMAL

## 2024-10-15 ENCOUNTER — E-CONSULT (OUTPATIENT)
Dept: INFECTIOUS DISEASES | Facility: HOSPITAL | Age: 84
End: 2024-10-15
Payer: MEDICARE

## 2024-10-15 DIAGNOSIS — R30.0 DYSURIA: Primary | ICD-10-CM

## 2024-10-15 PROCEDURE — 99451 NTRPROF PH1/NTRNET/EHR 5/>: CPT | Mod: ,,, | Performed by: INTERNAL MEDICINE

## 2024-10-15 NOTE — CONSULTS
[facility] INFECTIOUS DISEASE  Response for E-Consult     Patient Name: Magedio Padua  MRN: [id number]  Primary Care Provider: John Conway MD   Requesting Provider: Lizeth Marcial MD  E-Consult to Infectious Disease  Consult performed by: Serenity Larson DO  Consult ordered by: Lizeth Marcial MD          Recommendation: symptoms not improving after appropriate course of cefepime for pseudomonas present in urine cx. Recommend evaluating for alternative causes of dysuria. If symptoms persist, can repeat UA w/ reflex culture (must be clean catch), consider referral to urogynecology.    Contingency that warrants a repeat eConsult or referral: additional questions    Total time of Consultation: 5 minute    I did not speak to the requesting provider verbally about this.     *This eConsult is based on the clinical data available to me and is furnished without benefit of a physical examination. The eConsult will need to be interpreted in light of any clinical issues or changes in patient status not available to me at the time of filing this eConsults. Significant changes in patient condition or level of acuity should result in immediate formal consultation and reevaluation. Please alert me if you have further questions.    Thank you for this eConsult referral.     Serenity Larson DO  [facility] INFECTIOUS DISEASE

## 2024-10-15 NOTE — PROGRESS NOTES
Subjective:       Patient ID: Magedio Padua is a 84 y.o. male.    Chief Complaint: painful urination     This is a 84 y.o.  male patient that is new to me.  This is a past patient of Dr. Cardona who was last seen in 2021.  Urologic history consists of:  2021- TURP with Dr. Cardona due to bladder outlet obstruction after cysto and UDS.  Pathology from TURP showed-- Gl 3+4 prostate cancer in 7% of chips.   PSAs have been stable  PVRs with Dr. Cardona have been anywhere from 200s-300s.  Today patient reports pain with urination that started about two weeks ago. Reports that he has suprapubic pain that is described as aching and only occurs when he is urinating. Denies dysuria, hematuria, fevers, chills, flank pain, urinary frequency or urgency, weak stream.   Reports that he is taking flomax and proscar daily in the morning.  Reports that he drinks plenty of water-- 7-8 16oz bottles of water and coffee every morning.   PVR 16ml immediately after urinating in clinic  Urine culture 8 and 9/2024--  positive for pseudomonias aerginosa >100,000, patient was started on 7 day course of bactrim  9/5/24-- Patient here today for f/u. Reports that he finshed bactrim. Reports that he is still having dysuria. Reports that the suprapubic pain has resolved. Denies fever, pain, gross hematuria.  PVR 23ml immediately after urinating in clinic  Urine dipstick- positive for Leukocytes, RBC, and negative for Nitrites.       NCCT 9/2024: bilateral large bladder diverticulae, small stones on left diverticulum, prostate volume 40 gm.  Diverticulae noted at time of UDS in 2021.    Cystoscopy 9/18/24: 18-20 [place] bladder neck contracture evidence of previous prostate surgery, large left diverticulum no obvious stone seen.  No obvious lesions in the bladder.    LAST PSA  Lab Results   Component Value Date    PSA 1.2 02/17/2022    PSADIAG 0.93 08/26/2022       Lab Results   Component Value Date    CREATININE 1.0 09/24/2024        ---  PMH/PSH/Medications/Allergies/Social history reviewed and as in chart.    Review of Systems   Constitutional:  Negative for activity change, chills and fever.   Respiratory:  Negative for shortness of breath.    Cardiovascular:  Negative for chest pain and palpitations.   Gastrointestinal:  Negative for abdominal pain (suprapubic pain while urinating) and constipation.   Genitourinary:  Positive for dysuria. Negative for difficulty urinating, flank pain, frequency, hematuria and urgency.   Neurological:  Negative for dizziness and light-headedness.       Objective:      Physical Exam  HENT:      Head: Normocephalic.   Pulmonary:      Effort: Pulmonary effort is normal.   Abdominal:      General: Abdomen is flat.      Palpations: Abdomen is soft.   Genitourinary:     Prostate: Not enlarged, not tender and no nodules present.   Musculoskeletal:         General: Normal range of motion.      Cervical back: Normal range of motion.   Skin:     General: Skin is warm and dry.   Neurological:      Mental Status: He is alert and oriented to person, place, and time.         Results for orders placed or performed during the hospital encounter of 09/13/24 (from the past 2160 hours)   CT Renal Stone Study ABD Pelvis WO    Narrative    EXAMINATION:  CT RENAL STONE STUDY ABD PELVIS WO    CLINICAL HISTORY:  Bladder-neck obstruction;concern for kidney stone based on urine culture results;  Bladder-neck obstruction    FINDINGS:  There is a right middle lobe pulmonary micronodule.  Lung bases are otherwise clear.  Liver, gallbladder, biliary tree, spleen, stomach, pancreas, and duodenum show nothing unusual.  The adrenal glands are not enlarged.  There is a small hiatal hernia.  The kidneys demonstrate no mass, scar, stone, or hydronephrosis.  No obstruction, ileus, or perforation seen.  No para-aortic, retroperitoneal, or mesenteric adenopathy is seen.  There are bilateral large bladder diverticulum.  There is bladder wall  thickening.  There are 2 small calculi in the left bladder diverticulum.  The largest measures 3 mm.  There are no calculi in the right bladder diverticulum.  The bowel loops demonstrate nothing unusual.  No bladder or bladder neck/urethra calculi are seen.  The bones reveal DJD.      Impression    There are large lateral bladder diverticula.  The left bladder diverticulum contains 2 small calculi.  Kidneys ureters bladder and the right bladder diverticulum no calculi seen.    Small hiatal hernia.    No renal or ureter calculi seen.      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 Cardona in 2021     Diverticula, Bladder 9/20/2024    NCCT 9/2024: bilateral large bladder diverticulae, small stones on left diverticulum, prostate volume 40 gm.  Diverticulae noted at time of UDS in 2021.    Cystoscopy 9/18/24: 18-20 Faroese bladder neck contracture evidence of previous prostate surgery, large left diverticulum no obvious stone seen.  No obvious lesions in the bladder.           Plan:     Cystoscopy done and reviewed.  Separate from procedure discussed findings from CT scan.  He had a large left diverticulum that has been present for some time, questionable calcification dependent portion of the diverticulum.  Reviewed cystoscopy findings, no stone seen enlarged left diverticulum but could be due to limited visualization of entire tic.  Discussed likely diverticular present prior to previous TURP.  Would not recommend diverticulectomy at this point until has documented recurrent urinary tract infections.  Follow up in 3 months with nurse practitioner.  The above was discussed separate from cystoscopy.   I spent an additional 15 minutes over the procedure time on the day of the visit.  his includes face to face time and non-face to face time preparing to see the patient (eg, review of tests and  imaging), obtaining and/or reviewing separately obtained history, documenting clinical information in the electronic or other health record, independently interpreting results and communicating results to the patient/family/caregiver, or care coordinator.       Cruzito Hood MD    I spent a total of 25 minutes on the day of the visit.This includes face to face time and non-face to face time preparing to see the patient (eg, review of tests), obtaining and/or reviewing separately obtained history, documenting clinical information in the electronic or other health record, independently interpreting results and communicating results to the patient/family/caregiver, or care coordinator.

## 2024-10-16 ENCOUNTER — PROCEDURE VISIT (OUTPATIENT)
Dept: UROLOGY | Facility: CLINIC | Age: 84
End: 2024-10-16
Payer: MEDICARE

## 2024-10-16 VITALS
WEIGHT: 163.25 LBS | HEART RATE: 63 BPM | HEIGHT: 70 IN | SYSTOLIC BLOOD PRESSURE: 162 MMHG | DIASTOLIC BLOOD PRESSURE: 72 MMHG | BODY MASS INDEX: 23.37 KG/M2

## 2024-10-16 DIAGNOSIS — N32.0 BLADDER OUTLET OBSTRUCTION: Primary | ICD-10-CM

## 2024-10-16 DIAGNOSIS — R30.0 DYSURIA: ICD-10-CM

## 2024-10-16 DIAGNOSIS — N32.3 DIVERTICULA, BLADDER: ICD-10-CM

## 2024-10-16 RX ORDER — ATORVASTATIN CALCIUM 40 MG/1
40 TABLET, FILM COATED ORAL
COMMUNITY
Start: 2024-10-02

## 2024-10-16 NOTE — PROCEDURES
Cystoscopy    Date/Time: 10/16/2024 10:30 AM    Performed by: Cruzito Hood MD  Authorized by: Ashwini Johnson FNP    Consent Done?:  Yes (Written)  Timeout: prior to procedure the correct patient, procedure, and site was verified    Prep: patient was prepped and draped in usual sterile fashion    Local anesthesia used?: Yes    Anesthesia:  Lidocaine jelly  Indications: dysuria    Position:  Supine  Anesthesia:  Lidocaine jelly  Preparation: Patient was prepped and draped in usual sterile fashion    Scope type:  Flexible cystoscope  External exam normal: Yes (silicon ring to penis)    Urethra normal: Yes    Prostate normal: Yes    Bladder neck normal: No    bladder neck contracture    Bladder normal: No     patient tolerated the procedure well with no immediate complications  Comments:      18-20 [place]odian bladder neck contracture evidence of previous prostate surgery, large left diverticulum no obvious stone seen.  No obvious lesions in the bladder.

## 2024-12-12 ENCOUNTER — TELEPHONE (OUTPATIENT)
Dept: OTOLARYNGOLOGY | Facility: CLINIC | Age: 84
End: 2024-12-12
Payer: MEDICARE

## 2024-12-12 NOTE — TELEPHONE ENCOUNTER
----- Message from Betzy sent at 12/12/2024 10:49 AM CST -----  Regarding: Appt  Contact: [phone]  Type:  Needs Medical Advice    Who Called: Aniyah  Would the patient rather a call back or a response via [facility]? Call  Best Call Back Number: [phone]  Additional Information:  Patient calling back in ref to message left yesterday.

## 2025-06-18 ENCOUNTER — HOSPITAL ENCOUNTER (EMERGENCY)
Facility: HOSPITAL | Age: 85
Discharge: HOME | End: 2025-06-18
Attending: EMERGENCY MEDICINE
Payer: MEDICARE

## 2025-06-18 VITALS
HEIGHT: 70 IN | TEMPERATURE: 98 F | RESPIRATION RATE: 15 BRPM | HEART RATE: 70 BPM | OXYGEN SATURATION: 98 % | WEIGHT: 170 LBS | SYSTOLIC BLOOD PRESSURE: 151 MMHG | DIASTOLIC BLOOD PRESSURE: 72 MMHG | BODY MASS INDEX: 24.34 KG/M2

## 2025-06-18 DIAGNOSIS — N30.01 ACUTE CYSTITIS WITH HEMATURIA: ICD-10-CM

## 2025-06-18 DIAGNOSIS — R07.89 CHEST WALL PAIN: Primary | ICD-10-CM

## 2025-06-18 DIAGNOSIS — R07.9 CHEST PAIN: ICD-10-CM

## 2025-06-18 LAB
ABSOLUTE EOSINOPHIL (OHS): 0.54 K/UL
ABSOLUTE MONOCYTE (OHS): 0.62 K/UL (ref 0.3–1)
ABSOLUTE NEUTROPHIL COUNT (OHS): 3.06 K/UL (ref 1.8–7.7)
ALBUMIN SERPL BCP-MCNC: 3.8 G/DL (ref 3.5–5.2)
ALP SERPL-CCNC: 73 UNIT/L (ref 40–150)
ALT SERPL W/O P-5'-P-CCNC: 9 UNIT/L (ref 10–44)
ANION GAP (OHS): 7 MMOL/L (ref 8–16)
AST SERPL-CCNC: 14 UNIT/L (ref 11–45)
BACTERIA #/AREA URNS AUTO: ABNORMAL /HPF
BASOPHILS # BLD AUTO: 0.05 K/UL
BASOPHILS NFR BLD AUTO: 0.8 %
BILIRUB SERPL-MCNC: 0.5 MG/DL (ref 0.1–1)
BILIRUB UR QL STRIP.AUTO: NEGATIVE
BNP SERPL-MCNC: 49 PG/ML (ref 0–99)
BUN SERPL-MCNC: 19 MG/DL (ref 8–23)
CALCIUM SERPL-MCNC: 9 MG/DL (ref 8.7–10.5)
CHLORIDE SERPL-SCNC: 105 MMOL/L (ref 95–110)
CLARITY UR: ABNORMAL
CO2 SERPL-SCNC: 28 MMOL/L (ref 23–29)
COLOR UR AUTO: YELLOW
CREAT SERPL-MCNC: 1.1 MG/DL (ref 0.5–1.4)
ERYTHROCYTE [DISTWIDTH] IN BLOOD BY AUTOMATED COUNT: 13.8 % (ref 11.5–14.5)
GFR SERPLBLD CREATININE-BSD FMLA CKD-EPI: >60 ML/MIN/1.73/M2
GLUCOSE SERPL-MCNC: 97 MG/DL (ref 70–110)
GLUCOSE UR QL STRIP: NEGATIVE
HCT VFR BLD AUTO: 35 % (ref 40–54)
HGB BLD-MCNC: 11.4 GM/DL (ref 14–18)
HGB UR QL STRIP: ABNORMAL
HOLD SPECIMEN: NORMAL
IMM GRANULOCYTES # BLD AUTO: 0.01 K/UL (ref 0–0.04)
IMM GRANULOCYTES NFR BLD AUTO: 0.2 % (ref 0–0.5)
KETONES UR QL STRIP: NEGATIVE
LEUKOCYTE ESTERASE UR QL STRIP: ABNORMAL
LYMPHOCYTES # BLD AUTO: 1.61 K/UL (ref 1–4.8)
MCH RBC QN AUTO: 27.8 PG (ref 27–31)
MCHC RBC AUTO-ENTMCNC: 32.6 G/DL (ref 32–36)
MCV RBC AUTO: 85 FL (ref 82–98)
MICROSCOPIC COMMENT: ABNORMAL
NITRITE UR QL STRIP: POSITIVE
NUCLEATED RBC (/100WBC) (OHS): 0 /100 WBC
PH UR STRIP: 7 [PH]
PLATELET # BLD AUTO: 155 K/UL (ref 150–450)
PMV BLD AUTO: 9.9 FL (ref 9.2–12.9)
POTASSIUM SERPL-SCNC: 4.3 MMOL/L (ref 3.5–5.1)
PROT SERPL-MCNC: 7.9 GM/DL (ref 6–8.4)
PROT UR QL STRIP: ABNORMAL
RBC # BLD AUTO: 4.1 M/UL (ref 4.6–6.2)
RBC #/AREA URNS AUTO: 18 /HPF (ref 0–4)
RELATIVE EOSINOPHIL (OHS): 9.2 %
RELATIVE LYMPHOCYTE (OHS): 27.3 % (ref 18–48)
RELATIVE MONOCYTE (OHS): 10.5 % (ref 4–15)
RELATIVE NEUTROPHIL (OHS): 52 % (ref 38–73)
SODIUM SERPL-SCNC: 140 MMOL/L (ref 136–145)
SP GR UR STRIP: 1.01
TROPONIN I SERPL DL<=0.01 NG/ML-MCNC: 0.01 NG/ML
UROBILINOGEN UR STRIP-ACNC: NEGATIVE EU/DL
WBC # BLD AUTO: 5.89 K/UL (ref 3.9–12.7)
WBC #/AREA URNS AUTO: >100 /HPF (ref 0–5)

## 2025-06-18 PROCEDURE — 85025 COMPLETE CBC W/AUTO DIFF WBC: CPT | Performed by: PHYSICIAN ASSISTANT

## 2025-06-18 PROCEDURE — 87088 URINE BACTERIA CULTURE: CPT | Performed by: PHYSICIAN ASSISTANT

## 2025-06-18 PROCEDURE — 99285 EMERGENCY DEPT VISIT HI MDM: CPT | Mod: 25

## 2025-06-18 PROCEDURE — 93005 ELECTROCARDIOGRAM TRACING: CPT

## 2025-06-18 PROCEDURE — 93010 ELECTROCARDIOGRAM REPORT: CPT | Mod: ,,, | Performed by: INTERNAL MEDICINE

## 2025-06-18 PROCEDURE — 83880 ASSAY OF NATRIURETIC PEPTIDE: CPT | Performed by: PHYSICIAN ASSISTANT

## 2025-06-18 PROCEDURE — 82040 ASSAY OF SERUM ALBUMIN: CPT | Performed by: PHYSICIAN ASSISTANT

## 2025-06-18 PROCEDURE — 84484 ASSAY OF TROPONIN QUANT: CPT | Performed by: PHYSICIAN ASSISTANT

## 2025-06-18 PROCEDURE — 81003 URINALYSIS AUTO W/O SCOPE: CPT | Performed by: PHYSICIAN ASSISTANT

## 2025-06-18 PROCEDURE — 93010 ELECTROCARDIOGRAM REPORT: CPT | Mod: 76,,, | Performed by: INTERNAL MEDICINE

## 2025-06-18 RX ORDER — CIPROFLOXACIN 500 MG/1
500 TABLET, FILM COATED ORAL 2 TIMES DAILY
Qty: 10 TABLET | Refills: 0 | Status: SHIPPED | OUTPATIENT
Start: 2025-06-18 | End: 2025-06-23

## 2025-06-18 NOTE — ED TRIAGE NOTES
Pt presents with c/o intermittent left sided chest pain, worse with taking in a deep breath. Pt denies n/v no shortness of breath, no cough.

## 2025-06-18 NOTE — ED PROVIDER NOTES
Encounter Date: 6/18/2025       History     Chief Complaint   Patient presents with    Chest Pain     C/o nontraumatic L lateral sharp CP worsening with deep breathing x1 week. Pt denies other sx or complaints. Denies taking anything for sx.      85-year-old male, PMH HLD, UTIs, presents to ED by requested PCP for further evaluation of his left-sided chest pain that he states began 1 week ago.  Pain symptoms are sharp, worse with touch or deep breath, nonradiating, severity 3/10.  Denying any associated injury or trauma.  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  He has noticed intermittent cough but denying any cough today.  No shortness of breath.  No fevers, chills, URI like symptoms.  Denying any current abdominal pain but then states he will have intermittent suprapubic pain.  No urinary complaints.  No recent travel or surgery, leg swelling, history of DVT/PE.  Denying significant cardiac history.  No other acute complaints at this time    The history is provided by the patient. History limited by: Patient deferring offer for free .     Review of patient's allergies indicates:  No Known Allergies  Past Medical History:   Diagnosis Date    Abdominal pain     High cholesterol     Pseudomonas urinary tract infection 9/21/2024    Suprapubic pain, acute 08/15/2024     Past Surgical History:   Procedure Laterality Date    COLONOSCOPY N/A 8/4/2016    Procedure: History of melena/Colonoscopy;  Surgeon: Alejandro Borrero MD;  Location: [facility];  Service: Endoscopy;  Laterality: N/A;    CYSTOSCOPY WITH URODYNAMIC TESTING N/A 11/18/2021    Procedure: CYSTOSCOPY, WITH URODYNAMIC TESTING FLOUROSCOPIC;  Surgeon: Palmer Mendoza MD;  [address];  Service: Urology;  Laterality: N/A;  1HR    TRANSURETHRAL RESECTION OF PROSTATE N/A 12/8/2021    Procedure: TURP (TRANSURETHRAL RESECTION OF PROSTATE);  Surgeon: Jeanna Cardona MD;  Location: [facility];  Service: Urology;   Laterality: N/A;     No family history on file.  Social History[1]  Review of Systems   Constitutional:  Negative for chills and fever.   HENT:  Negative for congestion, ear pain and sore throat.    Respiratory:  Positive for cough (Resolved). Negative for shortness of breath.    Cardiovascular:  Positive for chest pain.   Gastrointestinal:  Negative for abdominal pain and vomiting.   Genitourinary:  Negative for dysuria.       Physical Exam     Initial Vitals [06/18/25 1046]   BP Pulse Resp Temp SpO2   (!) 140/66 74 20 97.9 °F (36.6 °C) 98 %      MAP       --         Physical Exam    Vitals reviewed.  Constitutional: Vital signs are normal. He appears well-developed and well-nourished. He is cooperative. He does not have a sickly appearance. He does not appear ill. No distress.   HENT:   Head: Normocephalic and atraumatic.   Eyes: EOM are normal.   Neck:   Normal range of motion.  Cardiovascular:  Normal rate and regular rhythm.           Pulmonary/Chest: Effort normal and breath sounds normal.   Reproducible tenderness to inferior aspect of left anterior/lateral rib cage.  No palpable deformities or acute skin changes.  Breath sounds clear bilaterally   Abdominal: There is no abdominal tenderness.   Musculoskeletal:      Cervical back: Normal range of motion.     Neurological: He is alert and oriented to person, place, and time. GCS eye subscore is 4. GCS verbal subscore is 5. GCS motor subscore is 6.   Psychiatric: He has a normal mood and affect. His speech is normal and behavior is normal.         ED Course   Procedures  Labs Reviewed   COMPREHENSIVE METABOLIC PANEL - Abnormal       Result Value    Sodium 140      Potassium 4.3      Chloride 105      CO2 28      Glucose 97      BUN 19      Creatinine 1.1      Calcium 9.0      Protein Total 7.9      Albumin 3.8      Bilirubin Total 0.5      ALP 73      AST 14      ALT 9 (*)     Anion Gap 7 (*)     eGFR >60     CBC WITH DIFFERENTIAL - Abnormal    WBC 5.89       RBC 4.10 (*)     HGB 11.4 (*)     HCT 35.0 (*)     MCV 85      MCH 27.8      MCHC 32.6      RDW 13.8      Platelet Count 155      MPV 9.9      Nucleated RBC 0      Neut % 52.0      Lymph % 27.3      Mono % 10.5      Eos % 9.2 (*)     Basophil % 0.8      Imm Grans % 0.2      Neut # 3.06      Lymph # 1.61      Mono # 0.62      Eos # 0.54 (*)     Baso # 0.05      Imm Grans # 0.01     URINALYSIS, REFLEX TO URINE CULTURE - Abnormal    Color, UA Yellow      Appearance, UA Hazy (*)     pH, UA 7.0      Spec Grav UA 1.010      Protein, UA Trace (*)     Glucose, UA Negative      Ketones, UA Negative      Bilirubin, UA Negative      Blood, UA 2+ (*)     Nitrites, UA Positive (*)     Urobilinogen, UA Negative      Leukocyte Esterase, UA 3+ (*)    URINALYSIS MICROSCOPIC - Abnormal    RBC, UA 18 (*)     WBC, UA >100 (*)     Bacteria, UA Rare      Microscopic Comment       TROPONIN I - Normal    Troponin-I 0.006     B-TYPE NATRIURETIC PEPTIDE - Normal    BNP 49     CULTURE, URINE   CBC W/ AUTO DIFFERENTIAL    Narrative:     The following orders were created for panel order CBC auto differential.  Procedure                               Abnormality         Status                     ---------                               -----------         ------                     CBC with Differential[[id number]]       Abnormal            Final result                 Please view results for these tests on the individual orders.   GREY TOP URINE HOLD    Extra Tube Hold for add-ons.          ECG Results              EKG 12-lead (In process)        Collection Time Result Time QRS Duration [facility] QTC Calculation    06/18/25 11:07:57 06/18/25 12:04:31 90 436                     In process by Interface, Lab In [facility] (06/18/25 12:04:35)                   Narrative:    Test Reason : R07.9,    Vent. Rate :  73 BPM     Atrial Rate :  73 BPM     P-R Int : 166 ms          QRS Dur :  90 ms      QT Int : 396 ms       P-R-T Axes :  48  34  58 degrees    QTcB  Int : 436 ms    Normal sinus rhythm  Normal ECG  When compared with ECG of 18-Jun-2025 10:43,  No significant change was found    Referred By: AAAREFERRAL SELF           Confirmed By:                                      EKG 12-lead (Chest Pain) Age >30 (In process)        Collection Time Result Time QRS Duration [facility]HS QTC Calculation    06/18/25 10:43:31 06/18/25 12:04:15 92 420                     In process by Interface, Lab In [facility] (06/18/25 12:04:18)                   Narrative:    Test Reason : R07.9,    Vent. Rate :  72 BPM     Atrial Rate :  72 BPM     P-R Int : 124 ms          QRS Dur :  92 ms      QT Int : 384 ms       P-R-T Axes : -29  52  65 degrees    QTcB Int : 420 ms    Normal sinus rhythm  Normal ECG  When compared with ECG of 30-Nov-2021 10:47,  GA interval has decreased  Criteria for Septal infarct are no longer Present    Referred By: AAAREFERRAL SELF           Confirmed By:                                   Imaging Results              X-Ray Chest AP Portable (Final result)  Result time 06/18/25 12:08:46      Final result by Mark Corado MD (06/18/25 12:08:46)                   Impression:      See above      Electronically signed by: Mark Corado MD  Date:    06/18/2025  Time:    12:08               Narrative:    EXAMINATION:  XR CHEST AP PORTABLE    CLINICAL HISTORY:  Chest Pain;    TECHNIQUE:  Single frontal view of the chest was performed.    COMPARISON:  No 11/30/2021 ne    FINDINGS:  Heart size normal.  The lungs are clear.  No pleural effusion                                       Medications - No data to display  Medical Decision Making  Patient presents by requested PCP for further evaluation of his left-sided chest pain that he states began 1 week ago.  He reports he has had intermittent cough but no current cough.  No shortness breath or leg swelling.  Afebrile with vitals WNL.  Patient in no distress on exam    DDx:  Including but not limited to musculoskeletal, costochondritis,  pleurisy, radiculopathy, neuropathy, strain, sprain, steady, stress, reflux, heartburn, GERD, ACS, CAD, STEMI, NSTEMI, pleural effusion, pneumonia, pneumothorax    Amount and/or Complexity of Data Reviewed  Labs: ordered. Decision-making details documented in ED Course.  Radiology: ordered. Decision-making details documented in ED Course.  ECG/medicine tests: ordered.     Details: EKG NSR, rate 72, no ST-elevation.  No STEMI    Risk  Prescription drug management.               ED Course as of 06/18/25 1541   Wed Jun 18, 2025   1153 CBC auto differential(!)  Unremarkable  [KS]   1244 Comprehensive metabolic panel(!)  Unremarkable [KS]   1244 Troponin I #1  WNL [KS]   1244 BNP  WNL [KS]   1253 X-Ray Chest AP Portable  No acute findings per Radiology review [KS]   1253 BP: 126/65 [KS]   1253 MAP (mmHg): 90 [KS]   1253 Pulse: 66 [KS]   1253 Resp: 15 [KS]   1253 SpO2: 100 % [KS]   1328 Urinalysis, Reflex to Urine Culture Urine, Clean Catch(!)  Concerning for urinary infection, noting nitrite positive, 3+ leukocyte, >100 WBC.  Will send urine to culture. [KS]   1337 Results were discussed with patient at length, who continues to rest comfortably.  Patient does have low heart score.  With careful consideration, I do have lower concern for acute cardiopulmonary event requiring emergent intervention.  I will referred to cardiology for further evaluation.  Suspect UTI treated with Cipro.  Patient encouraged to continue monitoring symptoms very closely with close outpatient follow-up and high ED return precautions.  Patient states his understanding and agrees with plan [KS]   1337 Patient discussed with attending, Dr. Rene, who agrees with ED course and dispo. [KS]      ED Course User Index  [KS] Erasmo Dobbins PA-C                           Clinical Impression:  Final diagnoses:  [R07.9] Chest pain  [R07.89] Chest wall pain (Primary)  [N30.01] Acute cystitis with hematuria          ED Disposition Condition    Discharge  Stable          ED Prescriptions       Medication Sig Dispense Start Date End Date Auth. Provider    ciprofloxacin HCl (CIPRO) 500 MG tablet Take 1 tablet (500 mg total) by mouth 2 (two) times daily. for 5 days 10 tablet 6/18/2025 6/23/2025 Erasmo Dobbins PA-C          Follow-up Information       Follow up With Specialties Details Why Contact Info    John Conway MD Internal Medicine   [address] [place] [address]  [phone]                     [1]   Social History  Tobacco Use    Smoking status: Never     Passive exposure: Never    Smokeless tobacco: Never   Substance Use Topics    Alcohol use: Yes     Comment: occasional beer    Drug use: Never        Erasmo Dobbins PA-C  06/18/25 3642

## 2025-06-18 NOTE — DISCHARGE INSTRUCTIONS

## 2025-06-19 ENCOUNTER — RESULTS FOLLOW-UP (OUTPATIENT)
Dept: EMERGENCY MEDICINE | Facility: HOSPITAL | Age: 85
End: 2025-06-19

## 2025-06-21 LAB
OHS QRS DURATION: 90 MS
OHS QRS DURATION: 92 MS
OHS QTC CALCULATION: 420 MS
OHS QTC CALCULATION: 436 MS

## 2025-06-23 LAB — BACTERIA UR CULT: ABNORMAL

## 2025-07-01 ENCOUNTER — HOSPITAL ENCOUNTER (INPATIENT)
Facility: HOSPITAL | Age: 85
LOS: 1 days | Discharge: HOME | DRG: 690 | End: 2025-07-03
Attending: EMERGENCY MEDICINE | Admitting: INTERNAL MEDICINE
Payer: MEDICARE

## 2025-07-01 ENCOUNTER — TELEPHONE (OUTPATIENT)
Dept: EMERGENCY MEDICINE | Facility: HOSPITAL | Age: 85
End: 2025-07-01
Payer: MEDICARE

## 2025-07-01 DIAGNOSIS — N32.3 DIVERTICULA, BLADDER: ICD-10-CM

## 2025-07-01 DIAGNOSIS — D69.6 THROMBOCYTOPENIA: ICD-10-CM

## 2025-07-01 DIAGNOSIS — Z85.46 HISTORY OF PROSTATE CANCER: ICD-10-CM

## 2025-07-01 DIAGNOSIS — N39.0 RECURRENT UTI: Primary | ICD-10-CM

## 2025-07-01 DIAGNOSIS — N32.0 BLADDER OUTLET OBSTRUCTION: ICD-10-CM

## 2025-07-01 DIAGNOSIS — N39.0 URINARY TRACT INFECTION: ICD-10-CM

## 2025-07-01 DIAGNOSIS — R11.0 NAUSEA: ICD-10-CM

## 2025-07-01 DIAGNOSIS — N30.01 ACUTE CYSTITIS WITH HEMATURIA: ICD-10-CM

## 2025-07-01 DIAGNOSIS — R30.0 DYSURIA: ICD-10-CM

## 2025-07-01 DIAGNOSIS — D50.9 IRON DEFICIENCY ANEMIA, UNSPECIFIED IRON DEFICIENCY ANEMIA TYPE: ICD-10-CM

## 2025-07-01 PROBLEM — I10 HTN (HYPERTENSION): Status: ACTIVE | Noted: 2025-07-01

## 2025-07-01 PROBLEM — N18.31 STAGE 3A CHRONIC KIDNEY DISEASE: Status: ACTIVE | Noted: 2025-07-01

## 2025-07-01 LAB
ABSOLUTE EOSINOPHIL (OHS): 0.56 K/UL
ABSOLUTE MONOCYTE (OHS): 0.7 K/UL (ref 0.3–1)
ABSOLUTE NEUTROPHIL COUNT (OHS): 2.08 K/UL (ref 1.8–7.7)
ALBUMIN SERPL BCP-MCNC: 3.4 G/DL (ref 3.5–5.2)
ALP SERPL-CCNC: 76 UNIT/L (ref 40–150)
ALT SERPL W/O P-5'-P-CCNC: 10 UNIT/L (ref 10–44)
ANION GAP (OHS): 9 MMOL/L (ref 8–16)
AST SERPL-CCNC: 16 UNIT/L (ref 11–45)
BASOPHILS # BLD AUTO: 0.07 K/UL
BASOPHILS NFR BLD AUTO: 1.3 %
BILIRUB SERPL-MCNC: 0.3 MG/DL (ref 0.1–1)
BILIRUB UR QL STRIP.AUTO: NEGATIVE
BUN SERPL-MCNC: 15 MG/DL (ref 8–23)
CALCIUM SERPL-MCNC: 8.6 MG/DL (ref 8.7–10.5)
CHLORIDE SERPL-SCNC: 107 MMOL/L (ref 95–110)
CHOLEST SERPL-MCNC: 155 MG/DL (ref 120–199)
CHOLEST/HDLC SERPL: 4.1 {RATIO} (ref 2–5)
CLARITY UR: ABNORMAL
CO2 SERPL-SCNC: 22 MMOL/L (ref 23–29)
COLOR UR AUTO: YELLOW
CREAT SERPL-MCNC: 1.2 MG/DL (ref 0.5–1.4)
ERYTHROCYTE [DISTWIDTH] IN BLOOD BY AUTOMATED COUNT: 13.9 % (ref 11.5–14.5)
FERRITIN SERPL-MCNC: 17.4 NG/ML (ref 20–300)
GFR SERPLBLD CREATININE-BSD FMLA CKD-EPI: 59 ML/MIN/1.73/M2
GLUCOSE SERPL-MCNC: 132 MG/DL (ref 70–110)
GLUCOSE UR QL STRIP: NEGATIVE
HCT VFR BLD AUTO: 31.1 % (ref 40–54)
HDLC SERPL-MCNC: 38 MG/DL (ref 40–75)
HDLC SERPL: 24.5 % (ref 20–50)
HGB BLD-MCNC: 10.2 GM/DL (ref 14–18)
HGB UR QL STRIP: ABNORMAL
IMM GRANULOCYTES # BLD AUTO: 0.01 K/UL (ref 0–0.04)
IMM GRANULOCYTES NFR BLD AUTO: 0.2 % (ref 0–0.5)
KETONES UR QL STRIP: NEGATIVE
LDLC SERPL CALC-MCNC: 97.6 MG/DL (ref 63–159)
LEUKOCYTE ESTERASE UR QL STRIP: ABNORMAL
LYMPHOCYTES # BLD AUTO: 2.03 K/UL (ref 1–4.8)
MCH RBC QN AUTO: 27.6 PG (ref 27–31)
MCHC RBC AUTO-ENTMCNC: 32.8 G/DL (ref 32–36)
MCV RBC AUTO: 84 FL (ref 82–98)
MICROSCOPIC COMMENT: ABNORMAL
NITRITE UR QL STRIP: NEGATIVE
NONHDLC SERPL-MCNC: 117 MG/DL
NUCLEATED RBC (/100WBC) (OHS): 0 /100 WBC
PH UR STRIP: 6 [PH]
PLATELET # BLD AUTO: 141 K/UL (ref 150–450)
PMV BLD AUTO: 10.5 FL (ref 9.2–12.9)
POTASSIUM SERPL-SCNC: 3.8 MMOL/L (ref 3.5–5.1)
PROT SERPL-MCNC: 7.1 GM/DL (ref 6–8.4)
PROT UR QL STRIP: NEGATIVE
RBC # BLD AUTO: 3.69 M/UL (ref 4.6–6.2)
RBC #/AREA URNS AUTO: 4 /HPF (ref 0–4)
RELATIVE EOSINOPHIL (OHS): 10.3 %
RELATIVE LYMPHOCYTE (OHS): 37.2 % (ref 18–48)
RELATIVE MONOCYTE (OHS): 12.8 % (ref 4–15)
RELATIVE NEUTROPHIL (OHS): 38.2 % (ref 38–73)
SODIUM SERPL-SCNC: 138 MMOL/L (ref 136–145)
SP GR UR STRIP: 1
TRIGL SERPL-MCNC: 97 MG/DL (ref 30–150)
TSH SERPL-ACNC: 3.26 UIU/ML (ref 0.4–4)
UROBILINOGEN UR STRIP-ACNC: NEGATIVE EU/DL
WBC # BLD AUTO: 5.45 K/UL (ref 3.9–12.7)
WBC #/AREA URNS AUTO: >100 /HPF (ref 0–5)
WBC CLUMPS UR QL AUTO: ABNORMAL

## 2025-07-01 PROCEDURE — 82728 ASSAY OF FERRITIN: CPT

## 2025-07-01 PROCEDURE — 87086 URINE CULTURE/COLONY COUNT: CPT

## 2025-07-01 PROCEDURE — 25000003 PHARM REV CODE 250

## 2025-07-01 PROCEDURE — 82465 ASSAY BLD/SERUM CHOLESTEROL: CPT

## 2025-07-01 PROCEDURE — 85025 COMPLETE CBC W/AUTO DIFF WBC: CPT

## 2025-07-01 PROCEDURE — G0378 HOSPITAL OBSERVATION PER HR: HCPCS

## 2025-07-01 PROCEDURE — 84443 ASSAY THYROID STIM HORMONE: CPT

## 2025-07-01 PROCEDURE — 80053 COMPREHEN METABOLIC PANEL: CPT

## 2025-07-01 PROCEDURE — 81001 URINALYSIS AUTO W/SCOPE: CPT

## 2025-07-01 PROCEDURE — 99285 EMERGENCY DEPT VISIT HI MDM: CPT

## 2025-07-01 RX ORDER — FINASTERIDE 5 MG/1
5 TABLET, FILM COATED ORAL DAILY
Status: DISCONTINUED | OUTPATIENT
Start: 2025-07-02 | End: 2025-07-03 | Stop reason: HOSPADM

## 2025-07-01 RX ORDER — ENOXAPARIN SODIUM 100 MG/ML
40 INJECTION SUBCUTANEOUS EVERY 24 HOURS
Status: DISCONTINUED | OUTPATIENT
Start: 2025-07-02 | End: 2025-07-03 | Stop reason: HOSPADM

## 2025-07-01 RX ORDER — SODIUM CHLORIDE 0.9 % (FLUSH) 0.9 %
10 SYRINGE (ML) INJECTION
Status: DISCONTINUED | OUTPATIENT
Start: 2025-07-01 | End: 2025-07-03 | Stop reason: HOSPADM

## 2025-07-01 RX ORDER — ACETAMINOPHEN 500 MG
1000 TABLET ORAL EVERY 8 HOURS PRN
Status: DISCONTINUED | OUTPATIENT
Start: 2025-07-01 | End: 2025-07-03 | Stop reason: HOSPADM

## 2025-07-01 RX ORDER — TAMSULOSIN HYDROCHLORIDE 0.4 MG/1
0.4 CAPSULE ORAL DAILY
Status: DISCONTINUED | OUTPATIENT
Start: 2025-07-02 | End: 2025-07-03 | Stop reason: HOSPADM

## 2025-07-01 RX ORDER — SODIUM CHLORIDE 9 MG/ML
500 INJECTION, SOLUTION INTRAVENOUS
Status: COMPLETED | OUTPATIENT
Start: 2025-07-01 | End: 2025-07-01

## 2025-07-01 RX ORDER — AMLODIPINE BESYLATE 5 MG/1
5 TABLET ORAL DAILY
Status: DISCONTINUED | OUTPATIENT
Start: 2025-07-01 | End: 2025-07-02

## 2025-07-01 RX ORDER — CEFEPIME HYDROCHLORIDE 1 G/1
1 INJECTION, POWDER, FOR SOLUTION INTRAMUSCULAR; INTRAVENOUS
Status: DISCONTINUED | OUTPATIENT
Start: 2025-07-01 | End: 2025-07-01

## 2025-07-01 RX ADMIN — SODIUM CHLORIDE 500 ML: 9 INJECTION, SOLUTION INTRAVENOUS at 09:07

## 2025-07-01 RX ADMIN — AMLODIPINE BESYLATE 5 MG: 5 TABLET ORAL at 11:07

## 2025-07-01 NOTE — TELEPHONE ENCOUNTER
Patient family member called emergency room.  About a week ago, patient was seen in the emergency room and placed on ciprofloxacin.  Susceptibility not sensitive to this.  He was called by ER staff and was told to come to the emergency room in order to be admitted for IV antibiotics.  Relative states that he had just told her about this.  She will bring him as soon as possible.

## 2025-07-02 LAB
ABSOLUTE EOSINOPHIL (OHS): 0.59 K/UL
ABSOLUTE MONOCYTE (OHS): 0.6 K/UL (ref 0.3–1)
ABSOLUTE NEUTROPHIL COUNT (OHS): 1.76 K/UL (ref 1.8–7.7)
ALBUMIN SERPL BCP-MCNC: 3.1 G/DL (ref 3.5–5.2)
ALP SERPL-CCNC: 76 UNIT/L (ref 40–150)
ALT SERPL W/O P-5'-P-CCNC: 9 UNIT/L (ref 10–44)
AMIKACIN PEAK SERPL-MCNC: 11.6 UG/ML (ref 10–60)
ANION GAP (OHS): 7 MMOL/L (ref 8–16)
AST SERPL-CCNC: 15 UNIT/L (ref 11–45)
BASOPHILS # BLD AUTO: 0.07 K/UL
BASOPHILS NFR BLD AUTO: 1.4 %
BILIRUB SERPL-MCNC: 0.3 MG/DL (ref 0.1–1)
BUN SERPL-MCNC: 15 MG/DL (ref 8–23)
CALCIUM SERPL-MCNC: 8.3 MG/DL (ref 8.7–10.5)
CHLORIDE SERPL-SCNC: 110 MMOL/L (ref 95–110)
CO2 SERPL-SCNC: 23 MMOL/L (ref 23–29)
CREAT SERPL-MCNC: 1 MG/DL (ref 0.5–1.4)
EAG (OHS): 134 MG/DL (ref 68–131)
ERYTHROCYTE [DISTWIDTH] IN BLOOD BY AUTOMATED COUNT: 13.8 % (ref 11.5–14.5)
FOLATE SERPL-MCNC: 11.2 NG/ML (ref 4–24)
GFR SERPLBLD CREATININE-BSD FMLA CKD-EPI: >60 ML/MIN/1.73/M2
GLUCOSE SERPL-MCNC: 114 MG/DL (ref 70–110)
HBA1C MFR BLD: 6.3 % (ref 4–5.6)
HCT VFR BLD AUTO: 30.8 % (ref 40–54)
HGB BLD-MCNC: 10 GM/DL (ref 14–18)
IMM GRANULOCYTES # BLD AUTO: 0.01 K/UL (ref 0–0.04)
IMM GRANULOCYTES NFR BLD AUTO: 0.2 % (ref 0–0.5)
IRON SATN MFR SERPL: 9 % (ref 20–50)
IRON SERPL-MCNC: 31 UG/DL (ref 45–160)
LYMPHOCYTES # BLD AUTO: 1.87 K/UL (ref 1–4.8)
MCH RBC QN AUTO: 27.3 PG (ref 27–31)
MCHC RBC AUTO-ENTMCNC: 32.1 G/DL (ref 32–36)
MCV RBC AUTO: 85 FL (ref 82–98)
NUCLEATED RBC (/100WBC) (OHS): 0 /100 WBC
OHS QRS DURATION: 100 MS
OHS QTC CALCULATION: 424 MS
PATHOLOGIST REVIEW - CBC/DIFF (OHS): NORMAL
PLATELET # BLD AUTO: 147 K/UL (ref 150–450)
PMV BLD AUTO: 10.1 FL (ref 9.2–12.9)
POTASSIUM SERPL-SCNC: 3.7 MMOL/L (ref 3.5–5.1)
PROT SERPL-MCNC: 6.6 GM/DL (ref 6–8.4)
RBC # BLD AUTO: 3.63 M/UL (ref 4.6–6.2)
RELATIVE EOSINOPHIL (OHS): 12 %
RELATIVE LYMPHOCYTE (OHS): 38.2 % (ref 18–48)
RELATIVE MONOCYTE (OHS): 12.2 % (ref 4–15)
RELATIVE NEUTROPHIL (OHS): 36 % (ref 38–73)
RETICS/RBC NFR AUTO: 1.1 % (ref 0.4–2)
SODIUM SERPL-SCNC: 140 MMOL/L (ref 136–145)
TIBC SERPL-MCNC: 354 UG/DL (ref 250–450)
TRANSFERRIN SERPL-MCNC: 239 MG/DL (ref 200–375)
VIT B12 SERPL-MCNC: 382 PG/ML (ref 210–950)
WBC # BLD AUTO: 4.9 K/UL (ref 3.9–12.7)

## 2025-07-02 PROCEDURE — 82746 ASSAY OF FOLIC ACID SERUM: CPT

## 2025-07-02 PROCEDURE — 84466 ASSAY OF TRANSFERRIN: CPT

## 2025-07-02 PROCEDURE — 82040 ASSAY OF SERUM ALBUMIN: CPT

## 2025-07-02 PROCEDURE — 11000001 HC ACUTE MED/SURG PRIVATE ROOM

## 2025-07-02 PROCEDURE — 83036 HEMOGLOBIN GLYCOSYLATED A1C: CPT

## 2025-07-02 PROCEDURE — 96365 THER/PROPH/DIAG IV INF INIT: CPT

## 2025-07-02 PROCEDURE — 63600175 PHARM REV CODE 636 W HCPCS

## 2025-07-02 PROCEDURE — 80150 ASSAY OF AMIKACIN: CPT

## 2025-07-02 PROCEDURE — 85025 COMPLETE CBC W/AUTO DIFF WBC: CPT

## 2025-07-02 PROCEDURE — 63600175 PHARM REV CODE 636 W HCPCS: Performed by: INTERNAL MEDICINE

## 2025-07-02 PROCEDURE — 93010 ELECTROCARDIOGRAM REPORT: CPT | Mod: ,,, | Performed by: INTERNAL MEDICINE

## 2025-07-02 PROCEDURE — 25000003 PHARM REV CODE 250

## 2025-07-02 PROCEDURE — 36415 COLL VENOUS BLD VENIPUNCTURE: CPT

## 2025-07-02 PROCEDURE — 85060 BLOOD SMEAR INTERPRETATION: CPT | Mod: ,,, | Performed by: PATHOLOGY

## 2025-07-02 PROCEDURE — 96375 TX/PRO/DX INJ NEW DRUG ADDON: CPT

## 2025-07-02 PROCEDURE — 93005 ELECTROCARDIOGRAM TRACING: CPT

## 2025-07-02 PROCEDURE — 82607 VITAMIN B-12: CPT

## 2025-07-02 PROCEDURE — 85045 AUTOMATED RETICULOCYTE COUNT: CPT

## 2025-07-02 RX ORDER — CEFEPIME HYDROCHLORIDE 1 G/1
1 INJECTION, POWDER, FOR SOLUTION INTRAMUSCULAR; INTRAVENOUS
Status: DISCONTINUED | OUTPATIENT
Start: 2025-07-02 | End: 2025-07-02

## 2025-07-02 RX ORDER — LANOLIN ALCOHOL/MO/W.PET/CERES
1 CREAM (GRAM) TOPICAL EVERY OTHER DAY
Status: DISCONTINUED | OUTPATIENT
Start: 2025-07-03 | End: 2025-07-03 | Stop reason: HOSPADM

## 2025-07-02 RX ORDER — CEFEPIME HYDROCHLORIDE 2 G/1
2 INJECTION, POWDER, FOR SOLUTION INTRAVENOUS
Status: DISCONTINUED | OUTPATIENT
Start: 2025-07-02 | End: 2025-07-03

## 2025-07-02 RX ORDER — LISINOPRIL 5 MG/1
5 TABLET ORAL DAILY
Status: DISCONTINUED | OUTPATIENT
Start: 2025-07-03 | End: 2025-07-02

## 2025-07-02 RX ORDER — LISINOPRIL 5 MG/1
5 TABLET ORAL DAILY
Status: DISCONTINUED | OUTPATIENT
Start: 2025-07-02 | End: 2025-07-03 | Stop reason: HOSPADM

## 2025-07-02 RX ORDER — LOSARTAN POTASSIUM 25 MG/1
25 TABLET ORAL DAILY
Status: DISCONTINUED | OUTPATIENT
Start: 2025-07-03 | End: 2025-07-02

## 2025-07-02 RX ORDER — ACETAMINOPHEN 500 MG
1000 TABLET ORAL ONCE
Status: DISCONTINUED | OUTPATIENT
Start: 2025-07-02 | End: 2025-07-03 | Stop reason: HOSPADM

## 2025-07-02 RX ADMIN — ENOXAPARIN SODIUM 40 MG: 40 INJECTION SUBCUTANEOUS at 05:07

## 2025-07-02 RX ADMIN — TAMSULOSIN HYDROCHLORIDE 0.4 MG: 0.4 CAPSULE ORAL at 09:07

## 2025-07-02 RX ADMIN — CEFEPIME 1 G: 1 INJECTION, POWDER, FOR SOLUTION INTRAMUSCULAR; INTRAVENOUS at 09:07

## 2025-07-02 RX ADMIN — AMLODIPINE BESYLATE 5 MG: 5 TABLET ORAL at 09:07

## 2025-07-02 RX ADMIN — LISINOPRIL 5 MG: 5 TABLET ORAL at 01:07

## 2025-07-02 RX ADMIN — FINASTERIDE 5 MG: 5 TABLET, FILM COATED ORAL at 09:07

## 2025-07-02 RX ADMIN — CEFEPIME 2 G: 2 INJECTION, POWDER, FOR SOLUTION INTRAVENOUS at 11:07

## 2025-07-02 RX ADMIN — CEFEPIME 2 G: 2 INJECTION, POWDER, FOR SOLUTION INTRAVENOUS at 05:07

## 2025-07-02 RX ADMIN — AMIKACIN SULFATE 375 MG: 250 INJECTION, SOLUTION INTRAMUSCULAR; INTRAVENOUS at 12:07

## 2025-07-02 NOTE — PLAN OF CARE
Problem: Adult Inpatient Plan of Care  Goal: Plan of Care Review  Outcome: Progressing  Goal: Patient-Specific Goal (Individualized)  Outcome: Progressing  Goal: Absence of Hospital-Acquired Illness or Injury  Outcome: Progressing  Goal: Optimal Comfort and Wellbeing  Outcome: Progressing  Goal: Readiness for Transition of Care  Outcome: Progressing     Problem: Fall Injury Risk  Goal: Absence of Fall and Fall-Related Injury  Outcome: Progressing     Problem: Chronic Kidney Disease  Goal: Optimal Coping with Chronic Illness  Outcome: Progressing  Goal: Electrolyte Balance  Outcome: Progressing  Goal: Fluid Balance  Outcome: Progressing  Goal: Optimal Functional Ability  Outcome: Progressing  Goal: Absence of Anemia Signs and Symptoms  Outcome: Progressing  Goal: Optimal Oral Intake  Outcome: Progressing  Goal: Acceptable Pain Control  Outcome: Progressing  Goal: Minimize Renal Failure Effects  Outcome: Progressing     Problem: Comorbidity Management  Goal: Blood Pressure in Desired Range  Outcome: Progressing     Problem: UTI (Urinary Tract Infection)  Goal: Improved Infection Symptoms  Outcome: Progressing

## 2025-07-02 NOTE — H&P
[facility] Medicine H&P Note     Admitting Team: [facility] Hospitalist Team A  Attending Physician: Charlette Thomson MD  Resident: MD Dinorah  Intern:  MD Kelby    Date of Admit: 7/1/2025    Chief Complaint     Burning with urination    Subjective:      History of Present Illness:  Magedio Padua is a 85 y.o.  male with past medical history of prostate cancer s/p TURP, HTN, HLD, CKD, Recurrent UTI who presented to the ED for dysuria. Was in his usual state of health until about two weeks ago when he started to experience dysuria. Was seen in ED for different complaint and UA at that time was consistent with infection. He was discharged on cipro with cultures pending. Presenting today with complaints of burning with urination with some nausea. Denies vomiting, fever, chills. No CVA tenderness or suprapubic pain. Denies hematuria. Reports last episode of dysuria was in 2022, however, was recently treated inpatient in 2024 with IV cefepime for 7 days total. Remainder of ROS reviewed and negative. No additional complaints on exam.     Past Medical History:  Past Medical History:   Diagnosis Date    Abdominal pain     High cholesterol     Pseudomonas urinary tract infection 9/21/2024    Suprapubic pain, acute 08/15/2024       Past Surgical History:  Past Surgical History:   Procedure Laterality Date    COLONOSCOPY N/A 8/4/2016    Procedure: History of melena/Colonoscopy;  Surgeon: Alejandro Borrero MD;  Location: [facility];  Service: Endoscopy;  Laterality: N/A;    CYSTOSCOPY WITH URODYNAMIC TESTING N/A 11/18/2021    Procedure: CYSTOSCOPY, WITH URODYNAMIC TESTING FLOUROSCOPIC;  Surgeon: Palmer Mendoza MD;  [address];  Service: Urology;  Laterality: N/A;  1HR    TRANSURETHRAL RESECTION OF PROSTATE N/A 12/8/2021    Procedure: TURP (TRANSURETHRAL RESECTION OF PROSTATE);  Surgeon: Jeanna Cardona MD;  Location: [facility];  Service: Urology;  Laterality: N/A;       Allergies:  No known drug allergies    Home  "Medications:reviewed with patient.   Finasteride 5 mg   Flomax 0.4 mg daily   Lisinopril 5 mg not taking  Asa: not taking  Lipitor 50 mg : not taking     Family History:  No documented family history. Did not ask during evaluation.     Social History:  Smoking history:   Alcohol use:  Drug use: denies  Living: lives alone, no family.    Review of Systems:  See HPI. All other systems are reviewed and are negative.    Health Maintaince :   Primary Care Physician: Dr. John Conway        Objective:   Last 24 Hour Vital Signs:  BP  Min: 121/58  Max: 153/67  Temp  Av.3 °F (36.8 °C)  Min: 98.3 °F (36.8 °C)  Max: 98.3 °F (36.8 °C)  Pulse  Av.3  Min: 74  Max: 80  Resp  Av  Min: 18  Max: 18  SpO2  Av.3 %  Min: 98 %  Max: 99 %  Height  Av' 10" (177.8 cm)  Min: 5' 10" (177.8 cm)  Max: 5' 10" (177.8 cm)  Weight  Av.2 kg (161 lb 6 oz)  Min: 73.2 kg (161 lb 6 oz)  Max: 73.2 kg (161 lb 6 oz)  Body mass index is 23.16 kg/m².  No intake/output data recorded.    Physical Examination:   Physical Exam  Constitutional:       General: He is not in acute distress.     Appearance: He is not ill-appearing or diaphoretic.   HENT:      Head: Normocephalic and atraumatic.      Nose: No rhinorrhea.   Eyes:      General: No scleral icterus.  Cardiovascular:      Rate and Rhythm: Normal rate.      Pulses: Normal pulses.      Heart sounds: Normal heart sounds. No murmur heard.  Pulmonary:      Effort: Pulmonary effort is normal.      Breath sounds: Normal breath sounds. No wheezing or rales.   Abdominal: no suprapubic tenderness.      General: There is no distension.      Palpations: Abdomen is soft.      Tenderness: There is no abdominal tenderness. There is no right CVA tenderness, left CVA tenderness, guarding or rebound.   Musculoskeletal:         General: No swelling or tenderness.      Right lower leg: No edema.      Left lower leg: No edema.   Skin:     Coloration: Skin is not jaundiced.      Findings: No rash. " "  Neurological:      General: No focal deficit present.      Mental Status: He is alert and oriented to person, place, and time.      Motor: No weakness.   Psychiatric:         Mood and Affect: Mood normal.         Behavior: Behavior normal.         Thought Content: Thought content normal.       Laboratory:  Most Recent Data:  CBC:   Lab Results   Component Value Date    WBC 5.45 07/01/2025    HGB 10.2 (L) 07/01/2025    HCT 31.1 (L) 07/01/2025     (L) 07/01/2025    MCV 84 07/01/2025    RDW 13.9 07/01/2025       BMP:   Lab Results   Component Value Date     07/01/2025    K 3.8 07/01/2025     07/01/2025    CO2 22 (L) 07/01/2025    BUN 15 07/01/2025    CREATININE 1.2 07/01/2025     (H) 07/01/2025    CALCIUM 8.6 (L) 07/01/2025    MG 2.0 09/22/2024    PHOS 3.3 09/22/2024     LFTs:   Lab Results   Component Value Date    PROT 7.1 07/01/2025    ALBUMIN 3.4 (L) 07/01/2025    BILITOT 0.3 07/01/2025    AST 16 07/01/2025    ALKPHOS 76 07/01/2025    ALT 10 07/01/2025     Coags: No results found for: "INR", "PROTIME", "PTT"  FLP: No results found for: "CHOL", "HDL", "LDLCALC", "TRIG", "CHOLHDL"  DM:   Lab Results   Component Value Date    CREATININE 1.2 07/01/2025     Thyroid: No results found for: "TSH", "FREET4", "Q2NPFSU", "I8MNEET", "THYROIDAB"  Anemia:   Lab Results   Component Value Date    IRON 57 09/22/2024    TIBC 330 09/22/2024    FERRITIN 45 09/22/2024    WGPTIJXV50 302 09/22/2024    FOLATE 7.6 09/22/2024     Cardiac:   Lab Results   Component Value Date    TROPONINI 0.006 06/18/2025    BNP 49 06/18/2025     Urinalysis:   Lab Results   Component Value Date    LABURIN >100,000 cfu/ml Pseudomonas aeruginosa (A) 06/18/2025    COLORU Yellow 07/01/2025    CLARITYU Clear 09/05/2024    SPECGRAV 1.005 07/01/2025    NITRITE Negative 07/01/2025    KETONESU Negative 10/09/2024    UROBILINOGEN Negative 07/01/2025    WBCUA >100 (H) 07/01/2025       Trended Lab Data:  Recent Labs   Lab 07/01/25 2034   WBC " "5.45   HGB 10.2*   HCT 31.1*   *   MCV 84   RDW 13.9      K 3.8      CO2 22*   BUN 15   CREATININE 1.2   *   PROT 7.1   ALBUMIN 3.4*   BILITOT 0.3   AST 16   ALKPHOS 76   ALT 10       Trended Cardiac Data:  No results for input(s): "TROPONINI", "CKTOTAL", "CKMB", "BNP" in the last 168 hours.    Microbiology Data:  Urine culture pending     Radiology:  Imaging Results    None         Assessment:     Magedio Padua is a 85 y.o. male with:  Patient Active Problem List    Diagnosis Date Noted    HTN (hypertension) 07/01/2025    Stage 3a chronic kidney disease 07/01/2025    Prostate cancer 10/09/2024    Bladder outlet obstruction 09/21/2024    Primary hypertension 09/21/2024    Normocytic anemia 09/21/2024    Acute cystitis 09/20/2024    Chronic anemia 09/20/2024    Diverticula, bladder 09/20/2024    Dysuria 09/05/2024    Screening for colon cancer     Dark stools 08/04/2016        Plan:     Acute cystitis, UTI  History of recurrent UTI, pseudomonas     Bladder diverticulum, bilateral  History of recurrent UTI requiring admission for IV antibiotics, formerly sensitive to cefepime. Resistant to multiple PO options. Hemodynamically stable, afebrile on admission. Will hold off on collecting blood cultures.   - UA on admission c/w current infection, reporting dysuria on admission.   - urine culture pending.   - 6/18/2025 urine culture w/pseudomonas aeruginosa, preliminary results sensitive to tobramycin and amikacin.   -prior cultures sensitive to cefepime and zosyn. Can possibly transition to cefepime pending final sensitivities.  -micro closed overnight for final sensitivities.   -will initiate amikacin on admission, transition pending final sensitivities   - bladder scan pending.   -no indication for retroperitoneal US on admission, adequate UOP no evidence of OMID, no concern for obstruction. Given history of renal stones.obstruction, can consider during hospitalization if necessary.   -need to " reestablish care with Urology and PCP; has history of inadequate follow up outpatient.    -urology considering diverticulectomy if recurrent UTI continues.     History of prostate cancer , bladder outlet obstruction s/p TURP 2021  -continue Flomax, finasteride. Follow up outpatient with urology on  d/c    HTN  Hypertensive on admission.   -chart history notable for lisinopril 5mg daily which patient is not taking for unknown reasons.   - initiate amlodipine inpatient  -given CKD, would continue lisinopril. Held on admission given initiation of amikacin     Anemia   -no evidence of bleeding on examination/evaluation.   - iron, ferritin, b12, folate pending review    Thrombocytopenia:   Near baseline  CMP daily  -follow up repeat cbc on d/c w/PCP    CKD  -no evidence of acute injury of admission.   -avoid nephrotoxins,   -follow up closely with PCP on discharge for further monitoring; appears to need re-establishment     HLD  -history documented per chart review. Not taking statin.   -no documented lipid panel on file  - lipid panel ordered. Follow up       DVT ppx: Lovenox  Diet: heart healthy  Code status: full       Code Status:     Full code    Mari Vasquez MD  [facility] Internal Medicine HO-3    [facility] Medicine Hospitalist Pager numbers:   [facility] Hospitalist Medicine Team A (Berto/Grupo): [phone]  [facility] Hospitalist Medicine Team B (Yuridia/Ronni):  [phone]

## 2025-07-02 NOTE — MEDICAL/APP STUDENT
"[facility] Medicine Progress Note    Primary Team: [facility] Hospitalist Team A  Attending Physician: Charlette Thomson MD  Resident: Dinorah  Intern: Kelby     Subjective:      No acute events overnight. Denying any pain this morning. Reports urinating 2x yesterday with minimal pain. Tolerating PO intake and ambulating to the restroom without any dizziness or lightheadedness.      Objective:     Last 24 Hour Vital Signs:  BP  Min: 121/58  Max: 172/78  Temp  Av.8 °F (36.6 °C)  Min: 97.4 °F (36.3 °C)  Max: 98.3 °F (36.8 °C)  Pulse  Av.3  Min: 55  Max: 80  Resp  Av  Min: 18  Max: 18  SpO2  Av.5 %  Min: 98 %  Max: 99 %  Height  Av' 10" (177.8 cm)  Min: 5' 10" (177.8 cm)  Max: 5' 10" (177.8 cm)  Weight  Av.2 kg (161 lb 6 oz)  Min: 73.2 kg (161 lb 6 oz)  Max: 73.2 kg (161 lb 6 oz)  No intake/output data recorded.    Physical Examination:  Physical Exam  Constitutional:       General: He is not in acute distress.     Appearance: Normal appearance.   HENT:      Head: Normocephalic and atraumatic.   Eyes:      Extraocular Movements: Extraocular movements intact.      Pupils: Pupils are equal, round, and reactive to light.   Cardiovascular:      Rate and Rhythm: Normal rate and regular rhythm.      Heart sounds: No murmur heard.     No gallop.   Pulmonary:      Effort: Pulmonary effort is normal.      Breath sounds: Normal breath sounds.   Abdominal:      General: Abdomen is flat.      Palpations: Abdomen is soft.      Tenderness: There is no abdominal tenderness. There is no guarding or rebound.   Musculoskeletal:      Right lower leg: Edema present.      Left lower leg: Edema present.      Comments: Trace edema in b/l LE   Neurological:      Mental Status: He is alert.       Laboratory:  Recent Labs   Lab 25  0216   WBC 5.45 4.90   HGB 10.2* 10.0*   HCT 31.1* 30.8*   * 147*   MCV 84 85   RDW 13.9 13.8    140   K 3.8 3.7    110   CO2 22* 23   BUN 15 15 "   CREATININE 1.2 1.0   * 114*   PROT 7.1 6.6   ALBUMIN 3.4* 3.1*   BILITOT 0.3 0.3   AST 16 15   ALKPHOS 76 76   ALT 10 9*     Microbiology Data Reviewed:  Pertinent Findings:  UA Culture: pending     Radiology Data Reviewed:   Pertinent Findings:  N/A      Assessment:     Magedio Padua is a 85 y.o.male with PMHx of prostate cancer s/p TURP, HTN, HLD, CKD, and recurrent UTIs who presented to [facility] on 7/1/25 for 2 weeks of dysuria. UA findings consistent with UTI. Prior UTI 9/2024 growing Pseudomonas sensitive to IV cefepime. Abx course was completed. Pt reported to ED on 6/18/25 with CP and was found to have UA consistent with UTI. Pt was discharged on PO cipro. Cultures growing Pseudomonas with sensitives to Amikacin and Tobramycin. Pt is admitted to [facility] for inpatient treatment of UTI with IV abx.       Plan:     Acute cystitis  History of recurrent UTI, pseudomonas     Bladder diverticulum, bilateral  - Pt has a history of recurrent UTIs; most recent culture on 6/18 growing Pseudomonas. Resistant to multiple PO options. Prelim results sensitive to tobramycin and amikacin.   - Cystoscopy 10/16/24 significant for large bladder diverticulum.   - Hemodynamically stable, afebrile. No need for blood cultures at this time.   - UA on admission consistent with UTI   - Urine culture pending  - Amikacin initiated on admission  - Transitioned to cefepime  - Consult ID for recommendations on PO abx and course duration   - Bladder scan pending  - No indication for retroperitoneal US at this time, adequate UOP with no evidence of OMID or concern for obstruction.   - Reestablish care with urology and PCP    History of prostate cancer , bladder outlet obstruction s/p TURP (2021)  - Managed at home with Flomax, finasteride  - Continue home meds  - Follow up OP with urology      HTN  - Hypertension on admission and during hospital stay  - Previously managed with 5 mg daily; no longer compliant  with medication   - Amlodipine initiated inpatient while receiving amikacin  - Given CKD, continue lisinopril OP   - Follow up with PCP OP     Anemia   - No evidence of overt bleeding  - Iron 31. Ferritin 17.4  - Vitamin B12 382  - Folate WNL  - Begin PO iron supplementation on discharge  - Being PO vitamin B12 supplementation on discharge     Thrombocytopenia:   - Baseline 120-140  - Plt count 147 at time of admission  - Continue to monitoring on daily CMP  - Follow up with hematology on discharge for further workup      CKD  - Cr 1.0, BUN 15 on admission; no evidence of OMID   - Avoid nephrotoxic agents  - Follow up with PCP on d/c     HLD  - Pt previously prescribed atorvastatin; not currently in compliance   - . LDL 97.6. HDL 38.  - Continue reinitiating statin on discharge  - Follow up with PCP    Diet: Heart Healthy  DVT Prophylaxis: Lovenox  Code: FULL    Dispo: Pending ID recs on abx for treatment of UTI    CEE [id number]

## 2025-07-02 NOTE — CARE UPDATE
Called and updated family, Ms. Monica Logan, who lives near the patient and usually helps him with medical care. She will unfortunately be out of the state from Friday-Wednesday but is requesting to be kept updated.

## 2025-07-02 NOTE — PROGRESS NOTES
Pharmacist Renal Dose Adjustment Note    Magedio Padua is a 85 y.o. male being treated with the medication cefepime    Patient Data:    Vital Signs (Most Recent):  Temp: 97.4 °F (36.3 °C) (07/02/25 1124)  Pulse: 63 (07/02/25 1124)  Resp: 20 (07/02/25 1124)  BP: (!) 161/77 (07/02/25 1124)  SpO2: 99 % (07/02/25 1124) Vital Signs (72h Range):  Temp:  [97.4 °F (36.3 °C)-98.3 °F (36.8 °C)]   Pulse:  [55-80]   Resp:  [18-20]   BP: (121-172)/(58-78)   SpO2:  [98 %-99 %]      Recent Labs   Lab 07/01/25 2034 07/02/25 0216   CREATININE 1.2 1.0     Serum creatinine: 1 mg/dL 07/02/25 0216  Estimated creatinine clearance: 54.8 mL/min    Medication:Cefepime dose: 1g frequency q 24 hrs will be changed to medication:cefepime dose:1g frequency:q 12 hrs    Pharmacist's Name: Leanna Carrington  Pharmacist's Extension: [phone]

## 2025-07-02 NOTE — PLAN OF CARE
Problem: Adult Inpatient Plan of Care  Goal: Plan of Care Review  Outcome: Progressing  Goal: Patient-Specific Goal (Individualized)  Outcome: Progressing  Goal: Absence of Hospital-Acquired Illness or Injury  Outcome: Progressing  Goal: Optimal Comfort and Wellbeing  Outcome: Progressing  Goal: Readiness for Transition of Care  Outcome: Progressing     Problem: Fall Injury Risk  Goal: Absence of Fall and Fall-Related Injury  Outcome: Progressing     Problem: Chronic Kidney Disease  Goal: Optimal Coping with Chronic Illness  Outcome: Progressing  Goal: Electrolyte Balance  Outcome: Progressing  Goal: Fluid Balance  Outcome: Progressing  Goal: Optimal Functional Ability  Outcome: Progressing  Goal: Absence of Anemia Signs and Symptoms  Outcome: Progressing    Aox4. VSS.  denied. Patient able to spontaneously urinate without difficulty. Medications administered per MAR. Safety maintained and call light in reach.

## 2025-07-02 NOTE — PLAN OF CARE
Inpatient Upgrade Note    Magedio Padua has warranted treatment spanning two or more midnights of hospital level care for the management of Acute Cystitis, Bladder diverticulum. He continues to require IV antibiotics, daily labs, further testing/imaging, monitoring of vital signs, medication adjustments, and further evaluation by consultants. His condition is also complicated by the following comorbidities: rostate cancer s/p TURP, HTN, HLD, CKD, Recurrent UTI .

## 2025-07-02 NOTE — NURSING
VIRTUAL NURSE: Pt arrived to unit. Permission received to turn camera to view patient.  services offered to patient, patient declined, states he is fluent in English and does not need an . Patient states that he has no family here but does have 2 friends listed as emergency contacts but declined need to call anyone. Contact information was verified.VIP model explained Admission questions completed.  Plan of care reviewed with patient. Educated patient on fall risk/POC. Call light within reach, side rails up x2. Patient instucted to call staff for assistance.      07/01/25 7400   Admission Complete   Admission Complete by VN Complete

## 2025-07-02 NOTE — PLAN OF CARE
MOON Message     Medicare Outpatient and Observation Notification regarding financial responsibility Other (comments); Explained to patient/caregiverMedicare Outpatient and Observation Notification regarding financial responsibility. Other (comments); Explained to patient/caregiver. Has comment. Taken on 7/2/25 1546   Date COPPOLA was signed 7/2/2025   Time COPPOLA was signed 7221

## 2025-07-02 NOTE — PROGRESS NOTES
"[facility] Medicine Progress Note    Primary Team: [facility] Hospitalist Team A  Attending Physician: Charlette Thomson MD  Resident: Dr. Leah Copeland  Intern: Dr. Stacy Lama    Admission Date:  7/1/2025  Hospital Day: Hospital Day: 2    Chief Complaint: dysuria x2 weeks    Chief Complaint   Patient presents with    Dysuria     Patient reports painful urination and frequency for two weeks. He was called by his provider and told to come to the ED because his urine culture was concerning. Denies fever, pain, n/v, flank pain.        Subjective/Interval History      NAEON. AFVSS on RA. Patient doing well this morning without major symptoms including no chest discomfort, trouble breathing, abdominal pain. He denies nausea, diarrhea, constipation, flank pain, hematuria. He still reports dysuria but feels it's improving.     Review of Systems:  All other systems reviewed and negative.     Objective     Physical Examination:  BP (!) 153/73   Pulse 63   Temp 97.5 °F (36.4 °C) (Oral)   Resp 18   Ht 5' 10" (1.778 m)   Wt 73.2 kg (161 lb 6 oz)   SpO2 99%   BMI 23.16 kg/m²    General: NAD, well-developed, non-toxic, eating comfortably in bed  HEENT:  Non-icteric sclerae, appropriate eye tracking.   CV/Chest: RRR, no obvious murmurs appreciated. No pain on chest wall palpation.  Resp: Stable on RA. CTAB on anterior lung fields. Symmetric chest rise, normal work of breathing, no accessory muscle use or conversational dyspnea.  GI: soft, non-tender, non-distended, bowel sounds active, no cva tenderness, no suprapubic tenderness, no rebound or guarding   Skin: Warm, dry. No rash.  MSK: moves all 4 extremities spontaneously, no gross deformities, no BLE edema  Pulses: strong peripheral pulses  Neuro: AAOx3 to self, time, and place. No gross FND noted. 5/5 strength in BUE/BLE. Following commands.  Psych: Normal mood and affect, holding linear conversations      Laboratory:  Recent Labs   Lab 07/01/25 2034 07/02/25  0216 "   WBC 5.45 4.90   HGB 10.2* 10.0*   * 147*   MCV 84 85    140   K 3.8 3.7    110   CO2 22* 23   BUN 15 15   CREATININE 1.2 1.0   * 114*   CALCIUM 8.6* 8.3*   PROT 7.1 6.6   ALBUMIN 3.4* 3.1*   AST 16 15   ALT 10 9*   ALKPHOS 76 76       Folate 11.2 -wnl   B12 - 382  Iron- 31 (L)/transferring -239/iron binding capacity 354/iron sat 9(L)  Ferritin 17.4 (L)    A1c 6.3 - pre - diab     Tsh 3.256    Lipid panel   Chol total 155/triglyceride 97/hdl 38 L/ldl 97.6    Urinalysis   Cloudy , positive for leukocyte esterase, and many wbcs   All laboratory data reviewed    Microbiology Data:   Urine culture in process     EKG Data: normal sinus rhythm       Imaging Data:   None      Current Medications:     Infusions:       Scheduled:   amLODIPine  5 mg Oral Daily    ceFEPime IV (PEDS and ADULTS)  1 g Intravenous Q24H    enoxparin  40 mg Subcutaneous Daily    finasteride  5 mg Oral Daily    tamsulosin  0.4 mg Oral Daily        PRN:    Current Facility-Administered Medications:     acetaminophen, 1,000 mg, Oral, Q8H PRN    sodium chloride 0.9%, 10 mL, Intravenous, PRN    Antibiotics and Day Number of Therapy:  Antibiotics (From admission, onward)      Start     Stop Route Frequency Ordered    07/02/25 0730  ceFEPIme injection 1 g         07/11/25 0729 IV Every 24 hours (non-standard times) 07/02/25 0628            Assessment/Plan:     Magedio Padua is a 85 y.o. male with PMHx of prostate cancer s/p turp, history of recurrent UTIs, htn, hld,ckd   who presented to [facility] on 7/1/2025 for dysuria for 2 weeks. He was seen in the ED at that time for a different complaint but UA came back positive for pseudomonas. He was discharged with ciprofloxacin which was he was later found to be resistant to. No subjective signs of obstruction  Patient is admitted to [facility] for dysuria with concern for recurrent resistant acute cystitis, pyelonephritis rule out.        Acute cystitis, UTI  History of recurrent UTI,  pseudomonas     Bladder diverticulum, bilateral  History of recurrent UTI requiring admission for IV antibiotics, formerly sensitive to cefepime. Resistant to multiple PO options. Hemodynamically stable, afebrile on admission. Will hold off on collecting blood cultures.   - UA on admission c/w current infection, reporting dysuria on admission.   - urine culture pending.   - 6/18/2025 urine culture w/pseudomonas aeruginosa, preliminary results sensitive to tobramycin and amikacin.   -prior cultures sensitive to cefepime and zosyn. -given amikacin on admission once, transitioning to cefepime  -no indication for retroperitoneal US on admission, adequate UOP no evidence of OMID, no concern for obstruction. Given history of renal stones.obstruction, can consider during hospitalization if necessary.   -need to reestablish care with Urology and PCP; has history of inadequate follow up outpatient.               -urology considering diverticulectomy if recurrent UTI continues. (This is the first since that note in 10/2024)  Plan:  Consider ct urogram to evaluate diverticula  Continue cefepime pending urine sensitivity      History of prostate cancer , bladder outlet obstruction s/p TURP 2021  -continue Flomax, finasteride.   -Follow up outpatient with urology on  d/c     HTN  Hypertensive on admission.   -chart history notable for lisinopril 5mg daily which patient is not taking for unknown reasons.   - initiate amlodipine inpatient  -given CKD2, would continue lisinopril. Held on admission given initiation of amikacin   Plan:  Consider stopping amlodipine and restarting lisinopril since we stopped amikacin      Normocytic Iron deficiency Anemia  H/H 10/20.8 (Baseline 11-12 hgb), mcv 85   -no evidence of bleeding on examination/evaluation.   Ferritin 17.4 (L)  Iron 31 L/transferrin 239/iron binding capacity 354/iron sat 9L  B12 382  Plan:  -add oral iron supplementation on discharge  -add oral b12 supplement on discharge      Prediabetic   Aic: 6.3  Plan:   -metformin on discharge      Thrombocytopenia:   Near baseline  Follow up on peripheral smear and reticulocyte count   CMP daily  -follow up repeat cbc on d/c w/PCP     CKD2  -no evidence of acute injury of admission.   -avoid nephrotoxins,   -follow up closely with PCP on discharge for further monitoring; appears to need re-establishment      HLD  -history documented per chart review. Not taking statin.   Lipid panel   Chol total 155/triglyceride 97/hdl 38 L/ldl 97.6  Plan:  Restart lipitor 50mg on discharge     #Healthcare maintenance   -primary care provider is John Conway MD   - immunizations: COVID 2024, flu 2025, tdap 2021, needs pneumococcal       Diet:  Diet Heart Healthy  VTE PPx: lovenox   Code Status:  Full Code    Dispo: pending resolution of culture sensitivities, pyelo ruleout     Stacy Lama MD PGY1     [facility] Hospitalist Pager numbers:   [facility] Hospitalist Medicine Team A (Berto/Grupo):          [phone]  [facility] Hospitalist Medicine Team B (Yuridia/Ronni):        [phone]

## 2025-07-02 NOTE — ED PROVIDER NOTES
Encounter Date: 7/1/2025       History     Chief Complaint   Patient presents with    Dysuria     Patient reports painful urination and frequency for two weeks. He was called by his provider and told to come to the ED because his urine culture was concerning. Denies fever, pain, n/v, flank pain.      85-year-old male with PMH of HLD and frequent UTIs, presenting to ED following phone call regarding urine culture results.  UA and culture done on 06/18 revealing Pseudomonas infection (>100,000 cfus) with sensitivity to only IV antibiotics.  Patient was called and informed to return to ED for admission for IV antibiotics.  He reports dysuria and frequency for 2 weeks.  He denies chest pain, shortness of breath, abdominal pain, nausea, vomiting, flank pain, fever, chills, or any other symptoms at this time.    The history is provided by the patient. No  was used.     Review of patient's allergies indicates:  No Known Allergies  Past Medical History:   Diagnosis Date    Abdominal pain     High cholesterol     Pseudomonas urinary tract infection 9/21/2024    Suprapubic pain, acute 08/15/2024     Past Surgical History:   Procedure Laterality Date    COLONOSCOPY N/A 8/4/2016    Procedure: History of melena/Colonoscopy;  Surgeon: Alejandro Borrero MD;  Location: [facility];  Service: Endoscopy;  Laterality: N/A;    CYSTOSCOPY WITH URODYNAMIC TESTING N/A 11/18/2021    Procedure: CYSTOSCOPY, WITH URODYNAMIC TESTING FLOUROSCOPIC;  Surgeon: Palmer Mendoza MD;  Location: [address];  Service: Urology;  Laterality: N/A;  1HR    TRANSURETHRAL RESECTION OF PROSTATE N/A 12/8/2021    Procedure: TURP (TRANSURETHRAL RESECTION OF PROSTATE);  Surgeon: Jeanna Cardona MD;  Location: [facility];  Service: Urology;  Laterality: N/A;     No family history on file.  Social History[1]  Review of Systems   Constitutional:  Negative for chills and fever.   HENT:  Negative for congestion.    Respiratory:  Negative for  shortness of breath.    Cardiovascular:  Negative for chest pain.   Gastrointestinal:  Negative for abdominal pain, diarrhea, nausea and vomiting.   Genitourinary:  Positive for dysuria and frequency. Negative for flank pain and hematuria.   Musculoskeletal:  Negative for neck pain and neck stiffness.   Skin:  Negative for rash.   Neurological:  Negative for headaches.   All other systems reviewed and are negative.      Physical Exam     Initial Vitals [07/01/25 1943]   BP Pulse Resp Temp SpO2   (!) 121/58 80 18 98.3 °F (36.8 °C) 98 %      MAP       --         Physical Exam    Vitals reviewed.  Constitutional: He appears well-developed and well-nourished. He is not diaphoretic.  Non-toxic appearance. No distress.   HENT:   Head: Normocephalic and atraumatic.   Eyes: Conjunctivae are normal.   Neck: Neck supple.   Normal range of motion.  Cardiovascular:  Normal rate.           Pulmonary/Chest: Breath sounds normal. No respiratory distress.   Abdominal: Abdomen is soft. He exhibits no distension. There is no abdominal tenderness.   Musculoskeletal:      Cervical back: Normal range of motion and neck supple.     Neurological: He is alert and oriented to person, place, and time. GCS score is 15. GCS eye subscore is 4. GCS verbal subscore is 5. GCS motor subscore is 6.   Psychiatric: He has a normal mood and affect. His behavior is normal. Judgment and thought content normal.         ED Course   Procedures  Labs Reviewed   URINALYSIS, REFLEX TO URINE CULTURE - Abnormal       Result Value    Color, UA Yellow      Appearance, UA Cloudy (*)     pH, UA 6.0      Spec Grav UA 1.005      Protein, UA Negative      Glucose, UA Negative      Ketones, UA Negative      Bilirubin, UA Negative      Blood, UA Trace (*)     Nitrites, UA Negative      Urobilinogen, UA Negative      Leukocyte Esterase, UA 2+ (*)    COMPREHENSIVE METABOLIC PANEL - Abnormal    Sodium 138      Potassium 3.8      Chloride 107      CO2 22 (*)     Glucose 132  (*)     BUN 15      Creatinine 1.2      Calcium 8.6 (*)     Protein Total 7.1      Albumin 3.4 (*)     Bilirubin Total 0.3      ALP 76      AST 16      ALT 10      Anion Gap 9      eGFR 59 (*)    CBC WITH DIFFERENTIAL - Abnormal    WBC 5.45      RBC 3.69 (*)     HGB 10.2 (*)     HCT 31.1 (*)     MCV 84      MCH 27.6      MCHC 32.8      RDW 13.9      Platelet Count 141 (*)     MPV 10.5      Nucleated RBC 0      Neut % 38.2      Lymph % 37.2      Mono % 12.8      Eos % 10.3 (*)     Basophil % 1.3      Imm Grans % 0.2      Neut # 2.08      Lymph # 2.03      Mono # 0.70      Eos # 0.56 (*)     Baso # 0.07      Imm Grans # 0.01     URINALYSIS MICROSCOPIC - Abnormal    RBC, UA 4      WBC, UA >100 (*)     WBC Clumps, UA Few (*)     Microscopic Comment       LIPID PANEL - Abnormal    Cholesterol Total 155      Triglyceride 97      HDL Cholesterol 38 (*)     LDL Cholesterol 97.6      HDL/Cholesterol Ratio 24.5      Cholesterol/HDL Ratio 4.1      Non HDL Cholesterol 117     CULTURE, URINE   CBC W/ AUTO DIFFERENTIAL    Narrative:     The following orders were created for panel order CBC auto differential.  Procedure                               Abnormality         Status                     ---------                               -----------         ------                     CBC with Differential[[id number]]       Abnormal            Final result                 Please view results for these tests on the individual orders.   GREY TOP URINE HOLD   HEMOGLOBIN A1C   IRON AND TIBC   VITAMIN B12   FOLATE          Imaging Results    None          Medications   tamsulosin 24 hr capsule 0.4 mg (has no administration in time range)   finasteride tablet 5 mg (has no administration in time range)   sodium chloride 0.9% flush 10 mL (has no administration in time range)   enoxaparin injection 40 mg (has no administration in time range)   acetaminophen tablet 1,000 mg (has no administration in time range)   amikacin - pharmacy to dose  (has no administration in time range)   amikacin (AMIKIN) 375 mg in D5W 100 mL IVPB (has no administration in time range)   amLODIPine tablet 5 mg (has no administration in time range)   0.9% NaCl infusion (500 mLs Intravenous New Bag 7/1/25 2100)     Medical Decision Making  Well-appearing 85-year-old male in no acute distress.  Afebrile.  Vitals stable.  Physical exam unremarkable.  No elevation WBC.  2+ leuks, 100+ WBC on UA.  Remainder of labs unremarkable.  Patient admitted to [facility] for an IV antibiotics.  Patient was discussed with ED attending, Dr. Flower, who is in agreement with ED course and dispo.    Differential Diagnosis includes, but is not limited to:  UTI/pyelonephritis, kidney stone, urinary retention, urethritis, appendicitis, prostatitis, testicular torsion/mass, incarcerated/strangulated hernia.        Amount and/or Complexity of Data Reviewed  Labs: ordered. Decision-making details documented in ED Course.    Risk  Prescription drug management.               ED Course as of 07/01/25 2308 Tue Jul 01, 2025 2056 Urinalysis Microscopic(!) [KG]   2121 Urinalysis, Reflex to Urine Culture Urine, Clean Catch(!) [KG]   2121 CBC auto differential(!) [KG]   2121 Comprehensive metabolic panel(!) [KG]   2130 Spoke with [facility] for admission for IV abx. They will come down to see patient [KG]      ED Course User Index  [KG] Alyson Hernandez PA-C                           Clinical Impression:  Final diagnoses:  [N39.0] Urinary tract infection          ED Disposition Condition    Observation                       [1]   Social History  Tobacco Use    Smoking status: Never     Passive exposure: Never    Smokeless tobacco: Never   Substance Use Topics    Alcohol use: Yes     Comment: occasional beer    Drug use: Never        Alyson Hernandez PA-C  07/01/25 2309

## 2025-07-02 NOTE — CONSULTS
"  [facility] Infectious Diseases Consult Note    Primary Attending Physician: Dr. Thomson    Consultant Attending: Dr. Hampton    Reason for Consult:     "Recurrent UTI with pseudomonas, rec for duration and if po available"    Assessment and Recommendations:      Magedio Padua is a 85 y.o. male with a history significant for HTN, HLD, CKD stage 2, prostate cancer s/p TURP, and recurrent UTIs presents for dysuria for 2 weeks. Urine culture 6/18 growing Pseudomonas aeruginosa. Repeat culture pending. Received 1 dose of amikacin in ED.     Lower Urinary Tract Infection   Continue Cefepime 2g q8h IV  Pending further urine culture data  Patient needs Urology follow-up    Arvin Zhao MD  [facility]  [facility] Infectious Diseases Service    Staff attestation to follow.    Subjective:      History of Present Illness:  Magedio Padua is a 85 y.o. male with a history significant for HTN, HLD, CKD stage 2, prostate cancer s/p TURP, and recurrent UTIs presents for dysuria for 2 weeks.     Patient previously presenting to the ED on 6/18. UA was obtained at the time which demonstrated 3+ leukocyte esterase, nitrite +, >100 WBC, and patient was discharged on Ciprofloxacin with urine cultures pending. Urine culture growing Pseudomonas not susceptible to Ciprofloxacin. Patient called back to the ED to receive IV antibiotics. Received 1 dose of amikacin and now on cefepime. Pt has remained afebrile.    Interview performed using [employer] .  Patient reports that he has been having penile pain for the last 2 weeks. He states that the pain and burning sensation is towards the meatus and in the urethra. He reports having increased urine frequency from his baseline. He denies any difficulty starting his urination stream but reports has difficulty maintaining his stream. Patient states that he has not seen his urologist since his procedure but notes that he has been regularly taking his Flomax and Finasteride. Patient denies any " fevers, chills, nausea, or vomiting.     Per chart review, patient has prior admissions for recurrent UTIs w/ pseudomonas aeruginosa requiring IV antibiotics in the past.     Past Medical History:  Past Medical History:   Diagnosis Date    Abdominal pain     High cholesterol     Pseudomonas urinary tract infection 9/21/2024    Suprapubic pain, acute 08/15/2024     Past Surgical History:  Past Surgical History:   Procedure Laterality Date    COLONOSCOPY N/A 8/4/2016    Procedure: History of melena/Colonoscopy;  Surgeon: Alejandro Borrero MD;  Location: [facility];  Service: Endoscopy;  Laterality: N/A;    CYSTOSCOPY WITH URODYNAMIC TESTING N/A 11/18/2021    Procedure: CYSTOSCOPY, WITH URODYNAMIC TESTING FLOUROSCOPIC;  Surgeon: Palmer Mendoza MD;  Location: [address];  Service: Urology;  Laterality: N/A;  1HR    TRANSURETHRAL RESECTION OF PROSTATE N/A 12/8/2021    Procedure: TURP (TRANSURETHRAL RESECTION OF PROSTATE);  Surgeon: Jeanna Cardona MD;  Location: [facility];  Service: Urology;  Laterality: N/A;     Allergies:  Review of patient's allergies indicates:  No Known Allergies    Medications:   In-Hospital Scheduled Medications:   acetaminophen  1,000 mg Oral Once    ceFEPime IV (PEDS and ADULTS)  1 g Intravenous Q24H    enoxparin  40 mg Subcutaneous Daily    [START ON 7/3/2025] ferrous sulfate  1 tablet Oral Every Other Day    finasteride  5 mg Oral Daily    lisinopriL  5 mg Oral Daily    tamsulosin  0.4 mg Oral Daily      In-Hospital PRN Medications:    Current Facility-Administered Medications:     acetaminophen, 1,000 mg, Oral, Q8H PRN    sodium chloride 0.9%, 10 mL, Intravenous, PRN   In-Hospital IV Infusion Medications:     Home Medications:  Current Outpatient Medications   Medication Instructions    atorvastatin (LIPITOR) 40 mg    COMBIGAN 0.2-0.5 % Drop 1 drop, 2 times daily    finasteride (PROSCAR) 5 mg, Daily    losartan (COZAAR) 100 mg, Oral, Daily    ofloxacin (FLOXIN) 0.3 % otic solution  "SMARTSI Drop(s) Right Ear Twice Daily    tamsulosin (FLOMAX) 0.4 mg, Daily     Family History:  No family history on file.    Social History:  Social History[1]    Lives at home by himself    Review of Systems   Constitutional:  Negative for chills and fever.   Respiratory:  Negative for cough.    Cardiovascular:  Negative for chest pain.   Gastrointestinal:  Negative for abdominal pain, nausea and vomiting.   Genitourinary:  Positive for dysuria, frequency and urgency. Negative for hematuria.   Skin:  Negative for itching and rash.       Objective:   Last 24 Hour Vital Signs:  BP  Min: 121/58  Max: 172/78  Temp  Av.7 °F (36.5 °C)  Min: 97.4 °F (36.3 °C)  Max: 98.3 °F (36.8 °C)  Pulse  Av.3  Min: 55  Max: 80  Resp  Av.5  Min: 18  Max: 20  SpO2  Av.6 %  Min: 98 %  Max: 99 %  Height  Av' 10" (177.8 cm)  Min: 5' 10" (177.8 cm)  Max: 5' 10" (177.8 cm)  Weight  Av.2 kg (161 lb 6 oz)  Min: 73.2 kg (161 lb 6 oz)  Max: 73.2 kg (161 lb 6 oz)  No intake/output data recorded.    General: awake, cooperative, no acute distress, answering questions appropriately  Head: Normocephalic, atraumatic  Lungs: clear to auscultation bilaterally, no accessory muscle use, resting comfortably on room air  Heart: regular rate and rhythm, normal S1 and S2, holosystolic murmur best appreciated left lower border  Abdomen: soft, non-tender, normal bowel sounds  Extremities: extremities normal, no joint deformity or tenderness noted  : no penile discharge or erythema appreciated, multiple stones palpated near meatus  Skin: warm and dry, no rashes appreciated,   Neuro: alert and oriented, follows commands    Laboratory Results:  Most Recent Data:  CBC:   Lab Results   Component Value Date    WBC 4.90 2025    HGB 10.0 (L) 2025    HCT 30.8 (L) 2025     (L) 2025    MCV 85 2025    RDW 13.8 2025     BMP:   Lab Results   Component Value Date     2025    K 3.7 " 2025     2025    CO2 23 2025    BUN 15 2025     (H) 2025    CALCIUM 8.3 (L) 2025    MG 2.0 2024    PHOS 3.3 2024     Urinalysis:   Lab Results   Component Value Date    LABURIN >100,000 cfu/ml Pseudomonas aeruginosa (A) 2025    COLORU Yellow 2025    CLARITYU Clear 2024    SPECGRAV 1.005 2025    NITRITE Negative 2025    KETONESU Negative 10/09/2024    UROBILINOGEN Negative 2025       Microbiology Data:  UA :  -2+ blood, nitrite +, >100 WBC, 3+ Leukocyte  Urine culture :      UA :  - >100 WBC, 2+ leukocyte, nitrite -    Urine culture : pending    Antimicrobials:  Amikacin 375 m/1  Cefepime: -present    Ciprofloxacin: -    Other Results:  Radiology:  X-Ray Chest AP Portable  Result Date: 2025  EXAMINATION: XR CHEST AP PORTABLE CLINICAL HISTORY: Chest Pain; TECHNIQUE: Single frontal view of the chest was performed. COMPARISON: No 2021 ne FINDINGS: Heart size normal.  The lungs are clear.  No pleural effusion     See above Electronically signed by: Mark Corado MD Date:    2025 Time:    12:08            [1]   Social History  Tobacco Use    Smoking status: Never     Passive exposure: Never    Smokeless tobacco: Never   Substance Use Topics    Alcohol use: Yes     Comment: occasional beer    Drug use: Never

## 2025-07-03 VITALS
WEIGHT: 161.38 LBS | SYSTOLIC BLOOD PRESSURE: 136 MMHG | OXYGEN SATURATION: 99 % | HEART RATE: 70 BPM | HEIGHT: 70 IN | TEMPERATURE: 98 F | BODY MASS INDEX: 23.1 KG/M2 | DIASTOLIC BLOOD PRESSURE: 73 MMHG | RESPIRATION RATE: 19 BRPM

## 2025-07-03 PROBLEM — D50.9 IRON DEFICIENCY ANEMIA: Status: ACTIVE | Noted: 2025-07-03

## 2025-07-03 PROBLEM — N18.2 STAGE 2 CHRONIC KIDNEY DISEASE: Status: ACTIVE | Noted: 2025-07-01

## 2025-07-03 PROBLEM — R73.03 PRE-DIABETES: Status: ACTIVE | Noted: 2025-07-03

## 2025-07-03 LAB
ABSOLUTE EOSINOPHIL (OHS): 0.47 K/UL
ABSOLUTE MONOCYTE (OHS): 0.47 K/UL (ref 0.3–1)
ABSOLUTE NEUTROPHIL COUNT (OHS): 1.67 K/UL (ref 1.8–7.7)
ALBUMIN SERPL BCP-MCNC: 3.3 G/DL (ref 3.5–5.2)
ALP SERPL-CCNC: 72 UNIT/L (ref 40–150)
ALT SERPL W/O P-5'-P-CCNC: 7 UNIT/L (ref 10–44)
ANION GAP (OHS): 6 MMOL/L (ref 8–16)
AST SERPL-CCNC: 14 UNIT/L (ref 11–45)
BASOPHILS # BLD AUTO: 0.06 K/UL
BASOPHILS NFR BLD AUTO: 1.4 %
BILIRUB SERPL-MCNC: 0.3 MG/DL (ref 0.1–1)
BUN SERPL-MCNC: 17 MG/DL (ref 8–23)
CALCIUM SERPL-MCNC: 8.5 MG/DL (ref 8.7–10.5)
CHLORIDE SERPL-SCNC: 108 MMOL/L (ref 95–110)
CO2 SERPL-SCNC: 23 MMOL/L (ref 23–29)
CREAT SERPL-MCNC: 1 MG/DL (ref 0.5–1.4)
ERYTHROCYTE [DISTWIDTH] IN BLOOD BY AUTOMATED COUNT: 13.7 % (ref 11.5–14.5)
GFR SERPLBLD CREATININE-BSD FMLA CKD-EPI: >60 ML/MIN/1.73/M2
GLUCOSE SERPL-MCNC: 110 MG/DL (ref 70–110)
HCT VFR BLD AUTO: 32.5 % (ref 40–54)
HGB BLD-MCNC: 10.3 GM/DL (ref 14–18)
IMM GRANULOCYTES # BLD AUTO: 0 K/UL (ref 0–0.04)
IMM GRANULOCYTES NFR BLD AUTO: 0 % (ref 0–0.5)
LYMPHOCYTES # BLD AUTO: 1.52 K/UL (ref 1–4.8)
MCH RBC QN AUTO: 27.3 PG (ref 27–31)
MCHC RBC AUTO-ENTMCNC: 31.7 G/DL (ref 32–36)
MCV RBC AUTO: 86 FL (ref 82–98)
NUCLEATED RBC (/100WBC) (OHS): 0 /100 WBC
PLATELET # BLD AUTO: 145 K/UL (ref 150–450)
PLATELET BLD QL SMEAR: ABNORMAL
PMV BLD AUTO: 10.6 FL (ref 9.2–12.9)
POTASSIUM SERPL-SCNC: 4 MMOL/L (ref 3.5–5.1)
PROT SERPL-MCNC: 7.1 GM/DL (ref 6–8.4)
RBC # BLD AUTO: 3.77 M/UL (ref 4.6–6.2)
RELATIVE EOSINOPHIL (OHS): 11.2 %
RELATIVE LYMPHOCYTE (OHS): 36.3 % (ref 18–48)
RELATIVE MONOCYTE (OHS): 11.2 % (ref 4–15)
RELATIVE NEUTROPHIL (OHS): 39.9 % (ref 38–73)
SODIUM SERPL-SCNC: 137 MMOL/L (ref 136–145)
WBC # BLD AUTO: 4.19 K/UL (ref 3.9–12.7)

## 2025-07-03 PROCEDURE — 25500020 PHARM REV CODE 255: Performed by: INTERNAL MEDICINE

## 2025-07-03 PROCEDURE — 63600175 PHARM REV CODE 636 W HCPCS: Performed by: INTERNAL MEDICINE

## 2025-07-03 PROCEDURE — 51798 US URINE CAPACITY MEASURE: CPT

## 2025-07-03 PROCEDURE — 25000003 PHARM REV CODE 250

## 2025-07-03 PROCEDURE — 80053 COMPREHEN METABOLIC PANEL: CPT

## 2025-07-03 PROCEDURE — 85025 COMPLETE CBC W/AUTO DIFF WBC: CPT

## 2025-07-03 PROCEDURE — 36415 COLL VENOUS BLD VENIPUNCTURE: CPT

## 2025-07-03 PROCEDURE — 25000003 PHARM REV CODE 250: Performed by: INTERNAL MEDICINE

## 2025-07-03 RX ORDER — CEFEPIME HYDROCHLORIDE 2 G/1
2 INJECTION, POWDER, FOR SOLUTION INTRAVENOUS
Status: DISCONTINUED | OUTPATIENT
Start: 2025-07-03 | End: 2025-07-03

## 2025-07-03 RX ORDER — TAMSULOSIN HYDROCHLORIDE 0.4 MG/1
0.4 CAPSULE ORAL DAILY
Qty: 30 CAPSULE | Refills: 0 | Status: SHIPPED | OUTPATIENT
Start: 2025-07-03

## 2025-07-03 RX ORDER — FERROUS SULFATE 324(65)MG
325 TABLET, DELAYED RELEASE (ENTERIC COATED) ORAL EVERY OTHER DAY
Qty: 15 TABLET | Refills: 0 | Status: SHIPPED | OUTPATIENT
Start: 2025-07-03

## 2025-07-03 RX ORDER — METFORMIN HYDROCHLORIDE 500 MG/1
500 TABLET, EXTENDED RELEASE ORAL
Qty: 30 TABLET | Refills: 0 | Status: SHIPPED | OUTPATIENT
Start: 2025-07-03 | End: 2025-08-02

## 2025-07-03 RX ORDER — FINASTERIDE 5 MG/1
5 TABLET, FILM COATED ORAL DAILY
Qty: 30 TABLET | Refills: 0 | Status: SHIPPED | OUTPATIENT
Start: 2025-07-03

## 2025-07-03 RX ORDER — CEFEPIME HYDROCHLORIDE 1 G/1
1 INJECTION, POWDER, FOR SOLUTION INTRAMUSCULAR; INTRAVENOUS
Status: DISCONTINUED | OUTPATIENT
Start: 2025-07-03 | End: 2025-07-03

## 2025-07-03 RX ORDER — LISINOPRIL 5 MG/1
5 TABLET ORAL DAILY
Qty: 90 TABLET | Refills: 0 | Status: SHIPPED | OUTPATIENT
Start: 2025-07-04 | End: 2025-10-02

## 2025-07-03 RX ADMIN — CEFEPIME 2 G: 2 INJECTION, POWDER, FOR SOLUTION INTRAVENOUS at 09:07

## 2025-07-03 RX ADMIN — FINASTERIDE 5 MG: 5 TABLET, FILM COATED ORAL at 09:07

## 2025-07-03 RX ADMIN — IOHEXOL 150 ML: 350 INJECTION, SOLUTION INTRAVENOUS at 11:07

## 2025-07-03 RX ADMIN — TOBRAMYCIN SULFATE 365 MG: 40 INJECTION, SOLUTION INTRAMUSCULAR; INTRAVENOUS at 03:07

## 2025-07-03 RX ADMIN — TAMSULOSIN HYDROCHLORIDE 0.4 MG: 0.4 CAPSULE ORAL at 09:07

## 2025-07-03 RX ADMIN — LISINOPRIL 5 MG: 5 TABLET ORAL at 09:07

## 2025-07-03 RX ADMIN — FERROUS SULFATE TAB 325 MG (65 MG ELEMENTAL FE) 1 EACH: 325 (65 FE) TAB at 09:07

## 2025-07-03 NOTE — PLAN OF CARE
Ox4. Refuses interpeter. Meds admin per MAR. Up in chair. Voids per urinal. Tolerated meals. Safety m/t.     Problem: Adult Inpatient Plan of Care  Goal: Plan of Care Review  Outcome: Progressing  Goal: Patient-Specific Goal (Individualized)  Outcome: Progressing  Goal: Absence of Hospital-Acquired Illness or Injury  Outcome: Progressing  Goal: Optimal Comfort and Wellbeing  Outcome: Progressing     Problem: Fall Injury Risk  Goal: Absence of Fall and Fall-Related Injury  Outcome: Progressing     Problem: Chronic Kidney Disease  Goal: Optimal Coping with Chronic Illness  Outcome: Progressing  Goal: Electrolyte Balance  Outcome: Progressing  Goal: Fluid Balance  Outcome: Progressing     Problem: UTI (Urinary Tract Infection)  Goal: Improved Infection Symptoms  Outcome: Progressing

## 2025-07-03 NOTE — DISCHARGE SUMMARY
[facility] Hospital Medicine Discharge Summary    Primary Team: [facility] Hospitalist Team A  Attending Physician: No att. providers found  Resident: Dr. Copeland  Intern: Dr. Lama     Date of Admit: 7/1/2025  Date of Discharge: 7/3/2025    Discharge to: Home/Self-Care  Condition: Stable    Discharge Diagnoses     Problem List[1]    Consultants and Procedures     Consultants:  Infectious Disease     Procedures:   None     Imaging:  CT Urogram     Brief History of Present Illness      Magedio Padua is a 85 y.o. male with PMHx of prostate cancer s/p turp, history of recurrent UTI, htn, hld,ckd who presented to [facility] on 7/1/2025 for dysuria for 2 weeks. He was seen in the ED on 6/18 for a different complaint but UA came back positive for UTI with culture growing pseudomonas and sensitive to amikacin and tobramycin. He was discharged with ciprofloxacin which was he was later found to be resistant to. No subjective signs of obstruction.  Patient is admitted to U Medicine for dysuria with concern for recurrent UTI requiring IV antibiotics. He was given amikacin once on admission, cefepime, 4 doses of cefepime, and tobramycin once prior to discharge. He will not require antibiotics outpatient. On the day of discharge, patient was afebrile with stable vital signs and will be discharged in stable condition with discharged back to home.    For the full HPI please refer to the History & Physical from this admission.    Hospital Course By Problem with Pertinent Findings     Acute cystitis, UTI  History of recurrent UTI, pseudomonas     History of prostate cancer, bladder outlet obstruction s/p TURP 2021  Bladder diverticulum, bilateral  -History of recurrent UTI requiring admission for IV antibiotics, formerly sensitive to cefepime.   - UA significant for infection, reporting dysuria on admission.   - urine culture pending  - 6/18/2025 urine culture w/pseudomonas aeruginosa, preliminary results sensitive to tobramycin and amikacin.    -prior cultures sensitive to cefepime and zosyn.   -given amikacin on admission once, transitioned to cefepime  -on admission, adequate UOP no evidence of OMID, no concern for obstruction.  -history of renal stones.     -urology considering diverticulectomy if recurrent UTI continues. (This is the first since that note in 10/2024)  -ct urogram to evaluate diverticula - pending  -Post void bladder scan had no significant retention.  -ID was consulted and recommended continuing cefepime, and then one last dose of tobramycin. This should be adequate coverage but if urine culture comes back with new organism that was not covered for, patient will be called.  -continued Flomax, finasteride.   -Follow up outpatient with urology      HTN  Hypertensive on admission and given amlodipine. This was then stopped.  -home regimen: lisinopril 5mg daily which patient was not taking (states he was no longer receiving it)  -given CKD2, restarted lisinopril once amikacin was stopped.     Normocytic Iron deficiency Anemia  H/H 10/20.8 (Baseline 11-12 hgb), mcv 85  no evidence of bleeding on examination/evaluation.   Ferritin 17.4 (L)  Iron 31 L/transferrin 239/iron binding capacity 354/iron sat 9L  B12 382  -started oral iron supplementation   -follow up with GI outpatient     Thrombocytopenia:   Near baseline  Retic count 1.1 with no clumping or morphology abnormalities seen on peripheral smear   Reticulocyte index <2 in the setting of significant iron deficiency anemia  -follow up repeat cbc on d/c w/PCP  -follow up with hematology outpatient     Prediabetic   A1c: 6.3  Plan:   -metformin on discharge    -follow up with pcp      CKD2  -no evidence of acute injury of admission.   -follow up closely with PCP on discharge for further monitoring; appears to need re-establishment      HLD  -history documented per chart review. Not taking statin.   Lipid panel   Chol total 155/triglyceride 97/hdl 38 L/ldl 97.6  -discussed lifestyle  "modification, consider restarting statin with pcp outpatient     #Healthcare maintenance   -primary care provider is John Conway MD   - immunizations: COVID 2024, flu 2025, tdap 2021, pneumococcal 2019  Diet: Heart Healthy  DVT Prophylaxis: Lovenox  Code: FULL    Discharge vital signs   99% on RA    /73   Pulse 70   Temp 98 °F (36.7 °C) (Oral)   Resp 19   Ht 5' 10" (1.778 m)   Wt 73.2 kg (161 lb 6 oz)   SpO2 99%   BMI 23.16 kg/m²      Discharge Medications        Medication List        START taking these medications      ferrous sulfate 324 mg (65 mg iron) Tbec  Take 1 tablet (324 mg total) by mouth every other day.     lisinopriL 5 MG tablet  Commonly known as: PRINIVIL,ZESTRIL  Take 1 tablet (5 mg total) by mouth once daily.  Start taking on: July 4, 2025     metFORMIN 500 MG ER 24hr tablet  Commonly known as: GLUCOPHAGE-XR  Take 1 tablet (500 mg total) by mouth daily with breakfast.            CONTINUE taking these medications      COMBIGAN 0.2-0.5 % Drop  Generic drug: brimonidine-timoloL     finasteride 5 mg tablet  Commonly known as: PROSCAR  Take 1 tablet (5 mg total) by mouth once daily.     tamsulosin 0.4 mg Cap  Commonly known as: FLOMAX  Take 1 capsule (0.4 mg total) by mouth once daily.            STOP taking these medications      atorvastatin 40 MG tablet  Commonly known as: LIPITOR     losartan 100 MG tablet  Commonly known as: COZAAR     ofloxacin 0.3 % otic solution  Commonly known as: FLOXIN               Where to Get Your Medications        These medications were sent to [facility] [address], [place] [address]      Hours: Mon-Fri, 8a-5:30p Phone: [phone]   ferrous sulfate 324 mg (65 mg iron) Tbec  finasteride 5 mg tablet  lisinopriL 5 MG tablet  metFORMIN 500 MG ER 24hr tablet  tamsulosin 0.4 mg Cap          Discharge Information:   Diet:  Diet Cardiac    Physical Activity:  As tolerated             Instructions:  1. Take all medications as " prescribed  2. Keep all follow-up appointments  3. Return to the hospital or call your primary care physicians if any worsening symptoms such as fever, chest pain, shortness of breath, return of symptoms, or any other concerns.    Follow-Up Appointments:  PCP  Urology  Hematology  GI    Pending: Urine culture and CT Urogram    Stacy Lama MD PGY1         [1]   Patient Active Problem List  Diagnosis    Dark stools    Screening for colon cancer    Dysuria    Acute cystitis    Chronic anemia    Diverticula, bladder    Urinary tract infection    Bladder outlet obstruction    Primary hypertension    Normocytic anemia    Prostate cancer    HTN (hypertension)    Stage 2 chronic kidney disease    Pre-diabetes    Iron deficiency anemia

## 2025-07-03 NOTE — PLAN OF CARE
Problem: Adult Inpatient Plan of Care  Goal: Plan of Care Review  Outcome: Met  Goal: Patient-Specific Goal (Individualized)  Outcome: Met  Goal: Absence of Hospital-Acquired Illness or Injury  Outcome: Met  Goal: Optimal Comfort and Wellbeing  Outcome: Met  Goal: Readiness for Transition of Care  Outcome: Met     Problem: Fall Injury Risk  Goal: Absence of Fall and Fall-Related Injury  Outcome: Met     Problem: Chronic Kidney Disease  Goal: Optimal Coping with Chronic Illness  Outcome: Met  Goal: Electrolyte Balance  Outcome: Met  Goal: Fluid Balance  Outcome: Met  Goal: Optimal Functional Ability  Outcome: Met  Goal: Absence of Anemia Signs and Symptoms  Outcome: Met  Goal: Optimal Oral Intake  Outcome: Met  Goal: Acceptable Pain Control  Outcome: Met  Goal: Minimize Renal Failure Effects  Outcome: Met     Problem: Comorbidity Management  Goal: Blood Pressure in Desired Range  Outcome: Met     Problem: UTI (Urinary Tract Infection)  Goal: Improved Infection Symptoms  Outcome: Met

## 2025-07-03 NOTE — PROGRESS NOTES
[facility] Surg  Infectious Disease  Progress Note       Assessment/Plan:     Magedio Padua is a 85 y.o. male with a history significant for HTN, HLD, CKD stage 2, prostate cancer s/p TURP, and recurrent UTIs presents for dysuria for 2 weeks. Urine culture  growing Pseudomonas aeruginosa. Received 1 dose of amikacin in ED. Has remained afebrile since admission. - suprapubic tenderness, - cva tenderness.    Lower Urinary Tract Infection  Can continue Cefepime 2g q8h IV until discharge  Give 1 dose of Tobramycin 5 mg/kg prior to discharge to conclude treatment for UTI  Follow up with Urology as outpatient     Arvin Zhao MD  [facility]  [facility]  [facility] Infectious Diseases Service    [facility] ID to sign off at this time.    Staff attestation to follow.    Subjective:      NAEON. Patient reports to be doing well. States that his dysuria is still present. Urinated 6x yesterday. Eating and drinking appropriately and ambulating. Pt states that he will follow up with Dr. Cardona in clinic. Denies any chest pain, shortness of breath, nausea, vomiting, abdominal pain.     Objective:     Last 24 Hour Vital Signs:  BP  Min: 118/69  Max: 161/77  Temp  Av.8 °F (36.6 °C)  Min: 97.4 °F (36.3 °C)  Max: 98.4 °F (36.9 °C)  Pulse  Av.7  Min: 59  Max: 75  Resp  Av.8  Min: 19  Max: 20  SpO2  Av %  Min: 97 %  Max: 99 %  No intake/output data recorded.    Physical Examination:  General: awake, cooperative, no acute distress  Head: Normocephalic, atraumatic  Lungs: clear to auscultation bilaterally, respirations unlabored, on room air  Heart: regular rate and rhythm. No murmur or gallops appreciated  Abdomen: soft, non-tender, normal bowel sounds, no suprapubic tenderness  Extremities: extremities normal, no joint deformity or tenderness noted  Skin: warm and dry, no rashes appreciated  : unchanged from prior exam. No penile discharge or erythema appreciated, multiple stones palpated near meatus.  Neuro: alert and oriented,  answering questions appropriately, follows commands     Laboratory:  Laboratory Data Reviewed: yes    Radiology Data Reviewed: yes    Microbiology Data Reviewed: yes  UA :  -2+ blood, nitrite +, >100 WBC, 3+ Leukocyte  Urine culture :       UA :  - >100 WBC, 2+ leukocyte, nitrite -     Urine culture : pending    Antibiotics and Day Number of Therapy:  Amikacin 375 m/1  Cefepime: -present     Ciprofloxacin: -    Current Medications:     Infusions:       Scheduled:   acetaminophen  1,000 mg Oral Once    ceFEPime IV (PEDS and ADULTS)  2 g Intravenous Q8H    enoxparin  40 mg Subcutaneous Daily    ferrous sulfate  1 tablet Oral Every Other Day    finasteride  5 mg Oral Daily    lisinopriL  5 mg Oral Daily    tamsulosin  0.4 mg Oral Daily        PRN:    Current Facility-Administered Medications:     acetaminophen, 1,000 mg, Oral, Q8H PRN    sodium chloride 0.9%, 10 mL, Intravenous, PRN      Assessment:     Magedio Padua is a 85 y.o.male with  Patient Active Problem List    Diagnosis Date Noted    HTN (hypertension) 2025    Stage 3a chronic kidney disease 2025    Prostate cancer 10/09/2024    Urinary tract infection 2024    Bladder outlet obstruction 2024    Primary hypertension 2024    Normocytic anemia 2024    Acute cystitis 2024    Chronic anemia 2024    Diverticula, bladder 2024    Dysuria 2024    Screening for colon cancer     Dark stools 2016

## 2025-07-03 NOTE — DISCHARGE INSTRUCTIONS
Our goal at [facility] is to always give you outstanding care and exceptional service. You may receive a survey from [employer] by mail, text or e-mail in the next 24-48 hours asking about the care you received with us. The survey should only take 5-10 minutes to complete and is very important to us.     Your feedback provides us with a way to recognize our staff who work tirelessly to provide the best care! Also, your responses help us learn how to improve when your experience was below our aspiration of excellence. We are always looking for ways to improve your stay. We WILL use your feedback to continue making improvements to help us provide the highest quality care. We keep your personal information and feedback confidential. We appreciate your time completing this survey and can't wait to hear from you!!!    We look forward to your continued care with us! Thanks so much for choosing [facility] for your healthcare needs!

## 2025-07-03 NOTE — PROGRESS NOTES
Pharmacokinetic Initial Assessment: Tobramycin    Assessment:  Weight utilized for dose calculation: Ideal Body Weight  Dosing method utilized: extended interval dosing    Plan: Extended interval dosing regimen: Tobramycin 365 mg IV once, followed by a random level to be drawn on 7/4 at 0300, 8-12 hours after the first dose.    Pharmacy will continue to monitor.    Please contact pharmacy at extension [phone] with any questions regarding this assessment.    Thank you for the consult,    Leanna Carrington       Patient brief summary:  Magedio Padua is a 85 y.o. male initiated on aminoglycoside therapy for treatment of suspected urinary tract infection    Drug Allergies:   Review of patient's allergies indicates:  No Known Allergies    Actual Body Weight:   73.2 kg    Adjust Body Weight:   73 kg    Ideal Body Weight:  73 kg    Renal Function:   Estimated Creatinine Clearance: 54.8 mL/min (based on SCr of 1 mg/dL).,     Dialysis Method (if applicable):  N/A    CBC (last 72 hours):  Recent Labs   Lab Result Units 07/01/25 2034 07/02/25  0004 07/02/25 0216 07/03/25  0903   WBC K/uL 5.45  --  4.90 4.19   HGB gm/dL 10.2*  --  10.0* 10.3*   Hemoglobin A1c %  --  6.3*  --   --    HCT % 31.1*  --  30.8* 32.5*   Platelet Count K/uL 141*  --  147* 145*   Lymph % % 37.2  --  38.2 36.3   Mono % % 12.8  --  12.2 11.2   Eos % % 10.3*  --  12.0* 11.2*   Basophil % % 1.3  --  1.4 1.4       Metabolic Panel (last 72 hours):  Recent Labs   Lab Result Units 07/01/25 2034 07/02/25 0216 07/03/25  0336   Sodium mmol/L 138 140 137   Potassium mmol/L 3.8 3.7 4.0   Chloride mmol/L 107 110 108   CO2 mmol/L 22* 23 23   Glucose mg/dL 132* 114* 110   Glucose, UA  Negative  --   --    BUN mg/dL 15 15 17   Creatinine mg/dL 1.2 1.0 1.0   Albumin g/dL 3.4* 3.1* 3.3*   Bilirubin Total mg/dL 0.3 0.3 0.3   ALP unit/L 76 76 72   AST unit/L 16 15 14   ALT unit/L 10 9* 7*       Microbiologic Results:  Microbiology Results (last 7 days)       Procedure  Component Value Units Date/Time    Urine culture [[id number]] Collected: 07/01/25 2034    Order Status: Sent Specimen: Urine, Clean Catch Updated: 07/01/25 2056

## 2025-07-03 NOTE — PLAN OF CARE
Future Appointments   Date Time Provider Department Center   7/3/2025  4:30 PM [facility] CT1 LIMIT 450 LBS [facility] CT SCAN Briana Ruiz         07/03/25 1437   Discharge Planning   Assessment Type Discharge Planning Brief Assessment   Resource/Environmental Concerns none   Support Systems Friends/neighbors   Current Living Arrangements home   Patient/Family Anticipates Transition to home;home with family   Patient/Family Anticipated Services at Transition none   DME Needed Upon Discharge  none   Discharge Plan A Home with family   Physical Activity   On average, how many days per week do you engage in moderate to strenuous exercise (like a brisk walk)? Pt Unable   On average, how many minutes do you engage in exercise at this level? Pt Unable   Financial Resource Strain   How hard is it for you to pay for the very basics like food, housing, medical care, and heating? Pt Unable   Housing Stability   In the last 12 months, was there a time when you were not able to pay the mortgage or rent on time? Pt Unable   At any time in the past 12 months, were you homeless or living in a shelter (including now)? Pt Unable   Transportation Needs   In the past 12 months, has lack of transportation kept you from medical appointments or from getting medications? Pt Unable   In the past 12 months, has lack of transportation kept you from meetings, work, or from getting things needed for daily living? Pt Unable   Food Insecurity   Within the past 12 months, you worried that your food would run out before you got the money to buy more. Pt Unable   Within the past 12 months, the food you bought just didn't last and you didn't have money to get more. Pt Unable   Stress   Do you feel stress - tense, restless, nervous, or anxious, or unable to sleep at night because your mind is troubled all the time - these days? Pt Unable   Social Isolation   How often do you feel lonely or isolated from those around you?  Patient unable to answer   Alcohol Use    Q1: How often do you have a drink containing alcohol? Pt Unable   Q2: How many drinks containing alcohol do you have on a typical day when you are drinking? Pt Unable   Q3: How often do you have six or more drinks on one occasion? Pt Unable   Utilities   In the past 12 months has the electric, gas, oil, or water company threatened to shut off services in your home? Pt Unable   Health Literacy   How often do you need to have someone help you when you read instructions, pamphlets, or other written material from your doctor or pharmacy? Patient unable to respond

## 2025-07-03 NOTE — NURSING
IV Insertion Nurse Note     #20G PIV placed in left forearm using ultrasound guidance. Blood return noted. Flushed & saline locked.      No acute issues at this time. Reviewed plan of care with Charge nurse, Myra.     Please call Rapid Response RN, Ashwini Peralta RN with any questions or concerns at [phone].

## 2025-07-03 NOTE — PROGRESS NOTES
Pharmacist Renal Dose Adjustment Note    Magedio Padua is a 85 y.o. male being treated with the medication cefepime    Patient Data:    Vital Signs (Most Recent):  Temp: 97.6 °F (36.4 °C) (07/03/25 0732)  Pulse: 67 (07/03/25 0732)  Resp: 19 (07/03/25 0421)  BP: 130/71 (07/03/25 0732)  SpO2: 98 % (07/03/25 0732) Vital Signs (72h Range):  Temp:  [97.4 °F (36.3 °C)-98.4 °F (36.9 °C)]   Pulse:  [55-80]   Resp:  [18-20]   BP: (118-172)/(58-78)   SpO2:  [97 %-99 %]      Recent Labs   Lab 07/01/25 2034 07/02/25  0216 07/03/25  0336   CREATININE 1.2 1.0 1.0     Serum creatinine: 1 mg/dL 07/03/25 0336  Estimated creatinine clearance: 54.8 mL/min    Medication:Cefepime dose: 2g frequency q 8 hrs will be changed to medication:cefepime dose:1g frequency:q 8 hrs    Pharmacist's Name: Leanna Carrington  Pharmacist's Extension: [phone]

## 2025-07-03 NOTE — PLAN OF CARE
Pt AAOx4. Medications administered per order. Pt denies pain, discomfort or nausea. Encouraged to call with questions/concerns/assistance. Safety.

## 2025-07-03 NOTE — PROGRESS NOTES
"[facility] Medicine Progress Note    Primary Team: [facility] Hospitalist Team A  Attending Physician: Charlette Thomson MD  Resident: Dr. Leah Copeland  Intern: Dr. Stacy Lama    Admission Date:  7/1/2025  Hospital Day: Hospital Day: 3    Chief Complaint: dysuria x2 weeks    Chief Complaint   Patient presents with    Dysuria     Patient reports painful urination and frequency for two weeks. He was called by his provider and told to come to the ED because his urine culture was concerning. Denies fever, pain, n/v, flank pain.        Subjective/Interval History      NAEON. AFVSS on RA. Patient resting comfortably this morning. He reports dysuria but denies n/v, abdominal pain, flank pain, suprapubic fullness, sob, or hematuria.    Review of Systems:  All other systems reviewed and negative.     Objective     Physical Examination:  /71   Pulse 67   Temp 97.6 °F (36.4 °C) (Oral)   Resp 19   Ht 5' 10" (1.778 m)   Wt 73.2 kg (161 lb 6 oz)   SpO2 98%   BMI 23.16 kg/m²      General: NAD, well-developed, non-toxic  HEENT:  Non-icteric sclerae, appropriate eye tracking.   CV/Chest: RRR, No pain on chest wall palpation.  Resp: Stable on RA. Symmetric chest rise, normal work of breathing, no accessory muscle use or conversational dyspnea.  GI: soft, non-tender, non-distended, no cva tenderness, no suprapubic tenderness, no rebound or guarding   Skin: Warm, dry. No rash.  MSK: moves all 4 extremities spontaneously, no gross deformities, trace nonpitting LE positional edema  Pulses: strong peripheral pulses  Neuro: AAOx3 to self, time, and place. No gross FND noted. Following commands.  Psych: Normal mood and affect, holding linear conversations      Laboratory:  Recent Labs   Lab 07/01/25  2034 07/02/25  0216 07/03/25  0336   WBC 5.45 4.90  --    HGB 10.2* 10.0*  --    * 147*  --    MCV 84 85  --     140 137   K 3.8 3.7 4.0    110 108   CO2 22* 23 23   BUN 15 15 17   CREATININE 1.2 1.0 1.0   GLU " 132* 114* 110   CALCIUM 8.6* 8.3* 8.5*   PROT 7.1 6.6 7.1   ALBUMIN 3.4* 3.1* 3.3*   AST 16 15 14   ALT 10 9* 7*   ALKPHOS 76 76 72       Folate 11.2 -wnl   B12 - 382  Iron- 31 (L)/transferring -239/iron binding capacity 354/iron sat 9(L)  Ferritin 17.4 (L)    A1c 6.3 - pre - diab     Tsh 3.256    Lipid panel   Chol total 155/triglyceride 97/hdl 38 L/ldl 97.6    Urinalysis   Cloudy , positive for leukocyte esterase, and many wbcs   All laboratory data reviewed    Microbiology Data:   Urine culture in process     EKG Data: normal sinus rhythm       Imaging Data:   None      Current Medications:     Infusions:       Scheduled:   acetaminophen  1,000 mg Oral Once    ceFEPime IV (PEDS and ADULTS)  2 g Intravenous Q8H    enoxparin  40 mg Subcutaneous Daily    ferrous sulfate  1 tablet Oral Every Other Day    finasteride  5 mg Oral Daily    lisinopriL  5 mg Oral Daily    tamsulosin  0.4 mg Oral Daily        PRN:    Current Facility-Administered Medications:     acetaminophen, 1,000 mg, Oral, Q8H PRN    sodium chloride 0.9%, 10 mL, Intravenous, PRN    Antibiotics and Day Number of Therapy:  Antibiotics (From admission, onward)      Start     Stop Route Frequency Ordered    07/02/25 1545  ceFEPIme injection 2 g         07/10/25 1544 IV Every 8 hours (non-standard times) 07/02/25 1440            Assessment/Plan:     Magedio Padua is a 85 y.o. male with PMHx of prostate cancer s/p turp, history of recurrent UTI, htn, hld,ckd who presented to [facility] [place] on 7/1/2025 for dysuria for 2 weeks. He was seen in the ED at that time for a different complaint but UA came back positive for pseudomonas. He was discharged with ciprofloxacin which was he was later found to be resistant to. No subjective signs of obstruction.  Patient is admitted to [facility] for dysuria with concern for recurrent UTI requiring IV antibiotics.     Acute cystitis, UTI  History of recurrent UTI, pseudomonas     History of prostate cancer, bladder outlet  obstruction s/p TURP 2021  Bladder diverticulum, bilateral  -History of recurrent UTI requiring admission for IV antibiotics, formerly sensitive to cefepime.   - UA on admission c/w current infection, reporting dysuria on admission.   - urine culture pending.   - 6/18/2025 urine culture w/pseudomonas aeruginosa, preliminary results sensitive to tobramycin and amikacin.   -prior cultures sensitive to cefepime and zosyn.   -given amikacin on admission once, transitioning to cefepime  -on admission, adequate UOP no evidence of OMID, no concern for obstruction.   -history of renal stones.obstruction.      -urology considering diverticulectomy if recurrent UTI continues. (This is the first since that note in 10/2024)  Plan:  -ct urogram to evaluate diverticula - ordered   -Continue cefepime 2g Q8H per ID pending urine sensitivity (if pseudomonas, plan for one dose of tobramycin)   -continue Flomax, finasteride.   -Follow up outpatient with urology on  d/c     HTN  Hypertensive on admission.   -home regimen: lisinopril 5mg daily which patient was not taking  -given CKD2, would continue lisinopril. Held on admission given initiation of amikacin   Plan:  Stopped amlodipine and restarted lisinopril since we stopped amikacin      Normocytic Iron deficiency Anemia  H/H 10/20.8 (Baseline 11-12 hgb), mcv 85  no evidence of bleeding on examination/evaluation.   Ferritin 17.4 (L)  Iron 31 L/transferrin 239/iron binding capacity 354/iron sat 9L  B12 382  Plan:  -add oral iron supplementation on discharge  -add oral b12 supplement on discharge     Thrombocytopenia:   Near baseline  Retic count 1.1 with no clumping or morphology abnormalities seen on peripheral smear   Reticulocyte index <2 in the setting of significant iron deficiency anemia  -follow up repeat cbc on d/c w/PCP  -follow up with hematology outpatient    Prediabetic   A1c: 6.3  Plan:   -metformin on discharge       CKD2  -no evidence of acute injury of admission.    -avoid nephrotoxins,   -follow up closely with PCP on discharge for further monitoring; appears to need re-establishment      HLD  -history documented per chart review. Not taking statin.   Lipid panel   Chol total 155/triglyceride 97/hdl 38 L/ldl 97.6  Plan:  Restart lipitor 50mg on discharge     #Healthcare maintenance   -primary care provider is John Conway MD   - immunizations: COVID 2024, flu 2025, tdap 2021, needs pneumococcal     Diet:  Diet Heart Healthy  VTE PPx: lovenox   Code Status:  Full Code    Dispo: pending resolution of culture sensitivities, pyelo ruleout     Stacy Lama MD PGY1     [facility] Medicine Hospitalist Pager numbers:   [facility] Hospitalist Medicine Team A (Berto/Grupo):          [phone]  [facility] Hospitalist Medicine Team B (Yuridia/Ronni):        [phone]

## 2025-07-03 NOTE — PLAN OF CARE
Anticipated discharge orders noted.     Pt's friend will provide transportation home following discharge.     SW requested the following follow up appointments: PCP, GI, Urology, and hemoc.     Pt is cleared to go from CM standpoint.     Future Appointments   Date Time Provider Department Center   7/3/2025  4:30 PM [facility] CT1 LIMIT 450 LBS [facility]         07/03/25 1438   Final Note   Assessment Type Final Discharge Note   Anticipated Discharge Disposition Home   What phone number can be called within the next 1-3 days to see how you are doing after discharge? [phone]   Post-Acute Status   Discharge Delays None known at this time

## 2025-07-04 LAB — BACTERIA UR CULT: ABNORMAL

## 2025-07-07 NOTE — PROGRESS NOTES
PATIENT: Magedio Padua  MRN: [id number]  DATE: 7/14/2025    Diagnosis:   1. Other iron deficiency anemia    2. Thrombocytopenia, unspecified    3. Iron deficiency anemia, unspecified iron deficiency anemia type    4. History of prostate cancer    5. Advance care planning        Chief Complaint: Anemia      Oncologic History:      Oncologic History     Oncologic Treatment     Pathology       Subjective:    History of Present Illness: Mr. Padua is a 85 y.o. male who presents for evaluation and management of iron deficiency anemia/thrombocytopenia. He is referred by Dr. Copeland.    - labs since 2021 reveal a mild anemia and intermittent thrombocytopenia  - iron studies (7/2/25) revealed a decreased ferritin/iron/saturated iron.  - today, he is doing well. He denies shortness of breath, chest pain, nausea, vomiting, diarrhea, constipation.    - his niece notes that he sometimes has blood in his stool, although he denies this.     Past medical, surgical, family, and social histories have been reviewed and updated below.    Past Medical History:   Past Medical History:   Diagnosis Date    Abdominal pain     High cholesterol     Pseudomonas urinary tract infection 9/21/2024    Suprapubic pain, acute 08/15/2024       Past Surgical History:   Past Surgical History:   Procedure Laterality Date    COLONOSCOPY N/A 8/4/2016    Procedure: History of melena/Colonoscopy;  Surgeon: Alejandro Borrero MD;  Location: [facility];  Service: Endoscopy;  Laterality: N/A;    CYSTOSCOPY WITH URODYNAMIC TESTING N/A 11/18/2021    Procedure: CYSTOSCOPY, WITH URODYNAMIC TESTING FLOUROSCOPIC;  Surgeon: Palmer Mendoza MD;  Location: [address];  Service: Urology;  Laterality: N/A;  1HR    TRANSURETHRAL RESECTION OF PROSTATE N/A 12/8/2021    Procedure: TURP (TRANSURETHRAL RESECTION OF PROSTATE);  Surgeon: Jeanna Cardona MD;  Location: [facility];  Service: Urology;  Laterality: N/A;       Family History: No family history on file.    Social  "History:  reports that he has never smoked. He has never been exposed to tobacco smoke. He has never used smokeless tobacco. He reports current alcohol use. He reports that he does not use drugs.    Allergies:  Review of patient's allergies indicates:  No Known Allergies    Medications:  Current Medications[1]    Review of Systems   Constitutional:  Positive for fatigue.   HENT:  Negative for sore throat.    Eyes:  Negative for visual disturbance.   Respiratory:  Negative for shortness of breath.    Cardiovascular:  Negative for chest pain.   Gastrointestinal:  Negative for abdominal pain.   Genitourinary:  Negative for dysuria.   Musculoskeletal:  Negative for back pain.   Skin:  Negative for rash.   Neurological:  Negative for headaches.   Hematological:  Negative for adenopathy.   Psychiatric/Behavioral:  The patient is not nervous/anxious.        ECOG Performance Status:   ECOG SCORE    1 - Restricted in strenuous activity-ambulatory and able to carry out work of a light nature         Objective:      Vitals:   Vitals:    07/14/25 0810   BP: (!) 169/77   BP Location: Right arm   Patient Position: Sitting   Pulse: 62   Resp: 18   Temp: 97.9 °F (36.6 °C)   TempSrc: Oral   SpO2: 99%   Weight: 73.5 kg (162 lb 0.6 oz)   Height: 5' 10" (1.778 m)     BMI: Body mass index is 23.25 kg/m².    Physical Exam  Vitals and nursing note reviewed.   Constitutional:       Appearance: He is well-developed.   HENT:      Head: Normocephalic and atraumatic.   Eyes:      Pupils: Pupils are equal, round, and reactive to light.   Cardiovascular:      Rate and Rhythm: Normal rate and regular rhythm.   Pulmonary:      Effort: Pulmonary effort is normal.      Breath sounds: Normal breath sounds.   Abdominal:      General: Bowel sounds are normal.      Palpations: Abdomen is soft.   Musculoskeletal:         General: Normal range of motion.      Cervical back: Normal range of motion and neck supple.   Skin:     General: Skin is warm and dry. "   Neurological:      Mental Status: He is alert and oriented to person, place, and time.   Psychiatric:         Behavior: Behavior normal.         Thought Content: Thought content normal.         Judgment: Judgment normal.         Laboratory Data:  Labs have been reviewed.    Lab Results   Component Value Date    WBC 4.19 07/03/2025    HGB 10.3 (L) 07/03/2025    HCT 32.5 (L) 07/03/2025    MCV 86 07/03/2025     (L) 07/03/2025       Lab Results   Component Value Date    FERRITIN 17.4 (L) 07/01/2025     Lab Results   Component Value Date    IRON 31 (L) 07/02/2025    TRANSFERRIN 239 07/02/2025    TIBC 354 07/02/2025    LABIRON 9 (L) 07/02/2025    FESATURATED 17 (L) 09/22/2024          Imaging:    Assessment:       1. Other iron deficiency anemia    2. Thrombocytopenia, unspecified    3. Iron deficiency anemia, unspecified iron deficiency anemia type    4. History of prostate cancer    5. Advance care planning           Plan:     Iron deficiency anemia / thrombocytopenia  - I have reviewed his chart  - labs since 2021 reveal a mild anemia and intermittent thrombocytopenia  - iron studies (7/2/25) revealed a decreased ferritin/iron/saturated iron.  - his niece notes that he sometimes has blood in his stool, although he denies this.   - I recommend ferric gluconate x 4 doses  - refer to gastroenterology for evaluation for possible gastrointestinal bleeding.  - return to clinic in 3 months with repeat labs and further workup.    2. History of prostate cancer  - s/p TURP 12/2021 then lost to Urology follow up in 2022 before returning to Urology 8/2024 with UTI   - check PSA with f/u labs.    3. Advance Care Planning     Date: 07/14/2025    Power of   I initiated the process of voluntary advance care planning today and explained the importance of this process to the patient.  I introduced the concept of advance directives to the patient, as well. Then the patient received detailed information about the  importance of designating a Health Care Power of  (HCPOA). He was also instructed to communicate with this person about their wishes for future healthcare, should he become sick and lose decision-making capacity. The patient has not previously appointed a HCPOA. After our discussion, the patient has decided to complete a HCPOA and has appointed his niece Irene Logan ([phone]) and neighbor Татьяна Uriostegui ([phone]). I encouraged him to communicate with this person about their wishes for future healthcare, should he become sick and lose decision-making capacity.      A total of 16 min was spent on advance care planning, goals of care discussion, emotional support, formulating and communicating prognosis and exploring burden/benefit of various approaches of treatment. This discussion occurred on a fully voluntary basis with the verbal consent of the patient and/or family.     - return to clinic in 3 months with repeat labs and further workup.      Hema Mendoza M.D.  Hematology/Oncology  [facility] - [address], [address]  [place] [address]  Phone: [phone]  Fax: [phone]         [1]   Current Outpatient Medications   Medication Sig Dispense Refill    COMBIGAN 0.2-0.5 % Drop Place 1 drop into the left eye 2 (two) times a day.      ferrous sulfate 324 mg (65 mg iron) TbEC Take 1 tablet (324 mg total) by mouth every other day. 15 tablet 0    finasteride (PROSCAR) 5 mg tablet Take 1 tablet (5 mg total) by mouth once daily. 30 tablet 0    lisinopriL (PRINIVIL,ZESTRIL) 5 MG tablet Take 1 tablet (5 mg total) by mouth once daily. 90 tablet 0    metFORMIN (GLUCOPHAGE-XR) 500 MG ER 24hr tablet Take 1 tablet (500 mg total) by mouth daily with breakfast. 30 tablet 0    tamsulosin (FLOMAX) 0.4 mg Cap Take 1 capsule (0.4 mg total) by mouth once daily. 30 capsule 0     No current facility-administered medications for this visit.

## 2025-07-14 ENCOUNTER — OFFICE VISIT (OUTPATIENT)
Dept: HEMATOLOGY/ONCOLOGY | Facility: CLINIC | Age: 85
End: 2025-07-14
Payer: MEDICARE

## 2025-07-14 VITALS
HEART RATE: 62 BPM | RESPIRATION RATE: 18 BRPM | HEIGHT: 70 IN | DIASTOLIC BLOOD PRESSURE: 77 MMHG | BODY MASS INDEX: 23.2 KG/M2 | WEIGHT: 162.06 LBS | OXYGEN SATURATION: 99 % | TEMPERATURE: 98 F | SYSTOLIC BLOOD PRESSURE: 169 MMHG

## 2025-07-14 DIAGNOSIS — D69.6 THROMBOCYTOPENIA, UNSPECIFIED: ICD-10-CM

## 2025-07-14 DIAGNOSIS — Z85.46 HISTORY OF PROSTATE CANCER: ICD-10-CM

## 2025-07-14 DIAGNOSIS — D50.9 IRON DEFICIENCY ANEMIA, UNSPECIFIED IRON DEFICIENCY ANEMIA TYPE: ICD-10-CM

## 2025-07-14 DIAGNOSIS — Z71.89 ADVANCE CARE PLANNING: ICD-10-CM

## 2025-07-14 DIAGNOSIS — D50.8 OTHER IRON DEFICIENCY ANEMIA: Primary | ICD-10-CM

## 2025-07-14 PROBLEM — C61 PROSTATE CANCER: Status: RESOLVED | Noted: 2024-10-09 | Resolved: 2025-07-14

## 2025-07-14 PROCEDURE — 3288F FALL RISK ASSESSMENT DOCD: CPT | Mod: CPTII,S$GLB,, | Performed by: INTERNAL MEDICINE

## 2025-07-14 PROCEDURE — 1160F RVW MEDS BY RX/DR IN RCRD: CPT | Mod: CPTII,S$GLB,, | Performed by: INTERNAL MEDICINE

## 2025-07-14 PROCEDURE — 99204 OFFICE O/P NEW MOD 45 MIN: CPT | Mod: 25,S$GLB,, | Performed by: INTERNAL MEDICINE

## 2025-07-14 PROCEDURE — 99999 PR PBB SHADOW E&M-EST. PATIENT-LVL IV: CPT | Mod: PBBFAC,,, | Performed by: INTERNAL MEDICINE

## 2025-07-14 PROCEDURE — 1158F ADVNC CARE PLAN TLK DOCD: CPT | Mod: CPTII,S$GLB,, | Performed by: INTERNAL MEDICINE

## 2025-07-14 PROCEDURE — 1159F MED LIST DOCD IN RCRD: CPT | Mod: CPTII,S$GLB,, | Performed by: INTERNAL MEDICINE

## 2025-07-14 PROCEDURE — 1126F AMNT PAIN NOTED NONE PRSNT: CPT | Mod: CPTII,S$GLB,, | Performed by: INTERNAL MEDICINE

## 2025-07-14 PROCEDURE — 1101F PT FALLS ASSESS-DOCD LE1/YR: CPT | Mod: CPTII,S$GLB,, | Performed by: INTERNAL MEDICINE

## 2025-07-14 PROCEDURE — 99497 ADVNCD CARE PLAN 30 MIN: CPT | Mod: 25,S$GLB,, | Performed by: INTERNAL MEDICINE

## 2025-07-14 PROCEDURE — 1111F DSCHRG MED/CURRENT MED MERGE: CPT | Mod: CPTII,S$GLB,, | Performed by: INTERNAL MEDICINE

## 2025-07-14 RX ORDER — SODIUM FERRIC GLUCONATE COMPLEX IN SUCROSE 12.5 MG/ML
125 INJECTION INTRAVENOUS
OUTPATIENT
Start: 2025-07-22

## 2025-07-14 RX ORDER — SODIUM CHLORIDE 0.9 % (FLUSH) 0.9 %
10 SYRINGE (ML) INJECTION
OUTPATIENT
Start: 2025-07-22

## 2025-07-14 RX ORDER — EPINEPHRINE 0.3 MG/.3ML
0.3 INJECTION SUBCUTANEOUS ONCE AS NEEDED
OUTPATIENT
Start: 2025-07-22

## 2025-07-14 RX ORDER — HEPARIN 100 UNIT/ML
500 SYRINGE INTRAVENOUS
OUTPATIENT
Start: 2025-07-22

## 2025-07-16 ENCOUNTER — TELEPHONE (OUTPATIENT)
Dept: INFUSION THERAPY | Facility: HOSPITAL | Age: 85
End: 2025-07-16
Payer: MEDICARE

## 2025-07-22 ENCOUNTER — TELEPHONE (OUTPATIENT)
Dept: INFUSION THERAPY | Facility: HOSPITAL | Age: 85
End: 2025-07-22
Payer: MEDICARE

## 2025-07-23 ENCOUNTER — TELEPHONE (OUTPATIENT)
Dept: INFUSION THERAPY | Facility: HOSPITAL | Age: 85
End: 2025-07-23
Payer: MEDICARE

## 2025-07-23 NOTE — TELEPHONE ENCOUNTER
(3rd attempt) multiple attempts have been made to call the patient to schedule infusions. No answer. Will call back at later date

## 2025-07-23 NOTE — TELEPHONE ENCOUNTER
Confirmed upcoming infusion appointments with the patient. Reviewed pre infusion instructions with the patient.   8/13 at 830a and every Wed thereafter x 3 at 830a

## 2025-08-13 ENCOUNTER — INFUSION (OUTPATIENT)
Dept: INFUSION THERAPY | Facility: HOSPITAL | Age: 85
End: 2025-08-13
Attending: INTERNAL MEDICINE
Payer: MEDICARE

## 2025-08-13 VITALS
DIASTOLIC BLOOD PRESSURE: 68 MMHG | SYSTOLIC BLOOD PRESSURE: 147 MMHG | TEMPERATURE: 98 F | OXYGEN SATURATION: 98 % | RESPIRATION RATE: 16 BRPM | HEART RATE: 70 BPM

## 2025-08-13 DIAGNOSIS — D50.8 OTHER IRON DEFICIENCY ANEMIA: Primary | ICD-10-CM

## 2025-08-13 PROCEDURE — A4216 STERILE WATER/SALINE, 10 ML: HCPCS | Performed by: INTERNAL MEDICINE

## 2025-08-13 PROCEDURE — 96374 THER/PROPH/DIAG INJ IV PUSH: CPT

## 2025-08-13 PROCEDURE — 63600175 PHARM REV CODE 636 W HCPCS: Performed by: INTERNAL MEDICINE

## 2025-08-13 PROCEDURE — 25000003 PHARM REV CODE 250: Performed by: INTERNAL MEDICINE

## 2025-08-13 RX ORDER — HEPARIN 100 UNIT/ML
500 SYRINGE INTRAVENOUS
OUTPATIENT
Start: 2025-08-20

## 2025-08-13 RX ORDER — SODIUM FERRIC GLUCONATE COMPLEX IN SUCROSE 12.5 MG/ML
125 INJECTION INTRAVENOUS
OUTPATIENT
Start: 2025-08-20

## 2025-08-13 RX ORDER — SODIUM FERRIC GLUCONATE COMPLEX IN SUCROSE 12.5 MG/ML
125 INJECTION INTRAVENOUS
Status: COMPLETED | OUTPATIENT
Start: 2025-08-13 | End: 2025-08-13

## 2025-08-13 RX ORDER — SODIUM CHLORIDE 0.9 % (FLUSH) 0.9 %
10 SYRINGE (ML) INJECTION
Status: DISCONTINUED | OUTPATIENT
Start: 2025-08-13 | End: 2025-08-13 | Stop reason: HOSPADM

## 2025-08-13 RX ORDER — EPINEPHRINE 0.3 MG/.3ML
0.3 INJECTION SUBCUTANEOUS ONCE AS NEEDED
OUTPATIENT
Start: 2025-08-20

## 2025-08-13 RX ORDER — SODIUM CHLORIDE 0.9 % (FLUSH) 0.9 %
10 SYRINGE (ML) INJECTION
OUTPATIENT
Start: 2025-08-20

## 2025-08-13 RX ADMIN — SODIUM CHLORIDE: 9 INJECTION, SOLUTION INTRAVENOUS at 08:08

## 2025-08-13 RX ADMIN — Medication 10 ML: at 09:08

## 2025-08-13 RX ADMIN — SODIUM FERRIC GLUCONATE COMPLEX IN SUCROSE 125 MG: 12.5 INJECTION INTRAVENOUS at 08:08

## 2025-08-18 PROBLEM — R30.0 DYSURIA: Status: RESOLVED | Noted: 2024-09-05 | Resolved: 2025-08-18

## 2025-08-18 PROBLEM — N39.0 URINARY TRACT INFECTION: Status: RESOLVED | Noted: 2024-09-21 | Resolved: 2025-08-18

## 2025-08-18 PROBLEM — N30.00 ACUTE CYSTITIS: Status: RESOLVED | Noted: 2024-09-20 | Resolved: 2025-08-18

## 2025-08-20 ENCOUNTER — INFUSION (OUTPATIENT)
Dept: INFUSION THERAPY | Facility: HOSPITAL | Age: 85
End: 2025-08-20
Attending: INTERNAL MEDICINE
Payer: MEDICARE

## 2025-08-20 VITALS
RESPIRATION RATE: 18 BRPM | OXYGEN SATURATION: 96 % | HEART RATE: 68 BPM | TEMPERATURE: 98 F | SYSTOLIC BLOOD PRESSURE: 164 MMHG | DIASTOLIC BLOOD PRESSURE: 65 MMHG

## 2025-08-20 DIAGNOSIS — D50.8 OTHER IRON DEFICIENCY ANEMIA: Primary | ICD-10-CM

## 2025-08-20 PROCEDURE — 25000003 PHARM REV CODE 250: Performed by: INTERNAL MEDICINE

## 2025-08-20 PROCEDURE — A4216 STERILE WATER/SALINE, 10 ML: HCPCS | Performed by: INTERNAL MEDICINE

## 2025-08-20 PROCEDURE — 63600175 PHARM REV CODE 636 W HCPCS: Performed by: INTERNAL MEDICINE

## 2025-08-20 PROCEDURE — 96374 THER/PROPH/DIAG INJ IV PUSH: CPT

## 2025-08-20 RX ORDER — SODIUM CHLORIDE 0.9 % (FLUSH) 0.9 %
10 SYRINGE (ML) INJECTION
OUTPATIENT
Start: 2025-08-20

## 2025-08-20 RX ORDER — SODIUM CHLORIDE 0.9 % (FLUSH) 0.9 %
10 SYRINGE (ML) INJECTION
Status: DISCONTINUED | OUTPATIENT
Start: 2025-08-20 | End: 2025-08-20 | Stop reason: HOSPADM

## 2025-08-20 RX ORDER — EPINEPHRINE 0.3 MG/.3ML
0.3 INJECTION SUBCUTANEOUS ONCE AS NEEDED
OUTPATIENT
Start: 2025-08-20 | End: 2037-01-15

## 2025-08-20 RX ORDER — HEPARIN 100 UNIT/ML
500 SYRINGE INTRAVENOUS
OUTPATIENT
Start: 2025-08-20

## 2025-08-20 RX ORDER — SODIUM FERRIC GLUCONATE COMPLEX IN SUCROSE 12.5 MG/ML
125 INJECTION INTRAVENOUS
Status: COMPLETED | OUTPATIENT
Start: 2025-08-20 | End: 2025-08-20

## 2025-08-20 RX ORDER — SODIUM FERRIC GLUCONATE COMPLEX IN SUCROSE 12.5 MG/ML
125 INJECTION INTRAVENOUS
OUTPATIENT
Start: 2025-08-20 | End: 2025-08-20

## 2025-08-20 RX ADMIN — SODIUM FERRIC GLUCONATE COMPLEX IN SUCROSE 125 MG: 12.5 INJECTION INTRAVENOUS at 08:08

## 2025-08-20 RX ADMIN — Medication 10 ML: at 09:08

## 2025-08-20 RX ADMIN — Medication 10 ML: at 08:08

## 2025-08-27 ENCOUNTER — INFUSION (OUTPATIENT)
Dept: INFUSION THERAPY | Facility: HOSPITAL | Age: 85
End: 2025-08-27
Attending: INTERNAL MEDICINE
Payer: MEDICARE

## 2025-08-27 ENCOUNTER — OFFICE VISIT (OUTPATIENT)
Dept: GASTROENTEROLOGY | Facility: CLINIC | Age: 85
End: 2025-08-27
Payer: MEDICARE

## 2025-08-27 VITALS — BODY MASS INDEX: 24.01 KG/M2 | WEIGHT: 167.31 LBS

## 2025-08-27 VITALS
HEART RATE: 70 BPM | DIASTOLIC BLOOD PRESSURE: 67 MMHG | SYSTOLIC BLOOD PRESSURE: 147 MMHG | TEMPERATURE: 98 F | RESPIRATION RATE: 18 BRPM | OXYGEN SATURATION: 100 %

## 2025-08-27 DIAGNOSIS — D50.9 IRON DEFICIENCY ANEMIA, UNSPECIFIED IRON DEFICIENCY ANEMIA TYPE: Primary | ICD-10-CM

## 2025-08-27 DIAGNOSIS — D50.8 OTHER IRON DEFICIENCY ANEMIA: Primary | ICD-10-CM

## 2025-08-27 PROCEDURE — 1126F AMNT PAIN NOTED NONE PRSNT: CPT | Mod: CPTII,S$GLB,,

## 2025-08-27 PROCEDURE — 1101F PT FALLS ASSESS-DOCD LE1/YR: CPT | Mod: CPTII,S$GLB,,

## 2025-08-27 PROCEDURE — 1159F MED LIST DOCD IN RCRD: CPT | Mod: CPTII,S$GLB,,

## 2025-08-27 PROCEDURE — 63600175 PHARM REV CODE 636 W HCPCS: Performed by: INTERNAL MEDICINE

## 2025-08-27 PROCEDURE — 25000003 PHARM REV CODE 250: Performed by: INTERNAL MEDICINE

## 2025-08-27 PROCEDURE — A4216 STERILE WATER/SALINE, 10 ML: HCPCS | Performed by: INTERNAL MEDICINE

## 2025-08-27 PROCEDURE — 1157F ADVNC CARE PLAN IN RCRD: CPT | Mod: CPTII,S$GLB,,

## 2025-08-27 PROCEDURE — 3288F FALL RISK ASSESSMENT DOCD: CPT | Mod: CPTII,S$GLB,,

## 2025-08-27 PROCEDURE — 99204 OFFICE O/P NEW MOD 45 MIN: CPT | Mod: S$GLB,,,

## 2025-08-27 PROCEDURE — 99999 PR PBB SHADOW E&M-EST. PATIENT-LVL III: CPT | Mod: PBBFAC,,,

## 2025-08-27 PROCEDURE — 96374 THER/PROPH/DIAG INJ IV PUSH: CPT

## 2025-08-27 RX ORDER — EPINEPHRINE 0.3 MG/.3ML
0.3 INJECTION SUBCUTANEOUS ONCE AS NEEDED
OUTPATIENT
Start: 2025-08-27 | End: 2037-01-22

## 2025-08-27 RX ORDER — SODIUM CHLORIDE 0.9 % (FLUSH) 0.9 %
10 SYRINGE (ML) INJECTION
OUTPATIENT
Start: 2025-08-27

## 2025-08-27 RX ORDER — SODIUM FERRIC GLUCONATE COMPLEX IN SUCROSE 12.5 MG/ML
125 INJECTION INTRAVENOUS
OUTPATIENT
Start: 2025-08-27 | End: 2025-08-27

## 2025-08-27 RX ORDER — SODIUM FERRIC GLUCONATE COMPLEX IN SUCROSE 12.5 MG/ML
125 INJECTION INTRAVENOUS
Status: COMPLETED | OUTPATIENT
Start: 2025-08-27 | End: 2025-08-27

## 2025-08-27 RX ORDER — HEPARIN 100 UNIT/ML
500 SYRINGE INTRAVENOUS
OUTPATIENT
Start: 2025-08-27

## 2025-08-27 RX ORDER — SODIUM CHLORIDE 0.9 % (FLUSH) 0.9 %
10 SYRINGE (ML) INJECTION
Status: DISCONTINUED | OUTPATIENT
Start: 2025-08-27 | End: 2025-08-27 | Stop reason: HOSPADM

## 2025-08-27 RX ADMIN — Medication 10 ML: at 09:08

## 2025-08-27 RX ADMIN — Medication 10 ML: at 08:08

## 2025-08-27 RX ADMIN — SODIUM FERRIC GLUCONATE COMPLEX IN SUCROSE 125 MG: 12.5 INJECTION INTRAVENOUS at 08:08

## 2025-09-03 ENCOUNTER — INFUSION (OUTPATIENT)
Dept: INFUSION THERAPY | Facility: HOSPITAL | Age: 85
End: 2025-09-03
Attending: INTERNAL MEDICINE
Payer: MEDICARE

## 2025-09-03 VITALS
HEART RATE: 68 BPM | OXYGEN SATURATION: 98 % | SYSTOLIC BLOOD PRESSURE: 145 MMHG | DIASTOLIC BLOOD PRESSURE: 72 MMHG | TEMPERATURE: 98 F | RESPIRATION RATE: 18 BRPM

## 2025-09-03 DIAGNOSIS — R52 PAIN: Primary | ICD-10-CM

## 2025-09-03 DIAGNOSIS — D50.8 OTHER IRON DEFICIENCY ANEMIA: Primary | ICD-10-CM

## 2025-09-03 PROCEDURE — 25000003 PHARM REV CODE 250: Performed by: INTERNAL MEDICINE

## 2025-09-03 PROCEDURE — 63600175 PHARM REV CODE 636 W HCPCS: Performed by: INTERNAL MEDICINE

## 2025-09-03 PROCEDURE — A4216 STERILE WATER/SALINE, 10 ML: HCPCS | Performed by: INTERNAL MEDICINE

## 2025-09-03 PROCEDURE — 96374 THER/PROPH/DIAG INJ IV PUSH: CPT

## 2025-09-03 RX ORDER — EPINEPHRINE 0.3 MG/.3ML
0.3 INJECTION SUBCUTANEOUS ONCE AS NEEDED
OUTPATIENT
Start: 2025-09-03 | End: 2037-01-29

## 2025-09-03 RX ORDER — SODIUM CHLORIDE 0.9 % (FLUSH) 0.9 %
10 SYRINGE (ML) INJECTION
OUTPATIENT
Start: 2025-09-03

## 2025-09-03 RX ORDER — SODIUM FERRIC GLUCONATE COMPLEX IN SUCROSE 12.5 MG/ML
125 INJECTION INTRAVENOUS
Status: COMPLETED | OUTPATIENT
Start: 2025-09-03 | End: 2025-09-03

## 2025-09-03 RX ORDER — EPINEPHRINE 0.3 MG/.3ML
0.3 INJECTION SUBCUTANEOUS ONCE AS NEEDED
Status: DISCONTINUED | OUTPATIENT
Start: 2025-09-03 | End: 2025-09-03 | Stop reason: HOSPADM

## 2025-09-03 RX ORDER — SODIUM CHLORIDE 0.9 % (FLUSH) 0.9 %
10 SYRINGE (ML) INJECTION
Status: DISCONTINUED | OUTPATIENT
Start: 2025-09-03 | End: 2025-09-03 | Stop reason: HOSPADM

## 2025-09-03 RX ORDER — HEPARIN 100 UNIT/ML
500 SYRINGE INTRAVENOUS
OUTPATIENT
Start: 2025-09-03

## 2025-09-03 RX ORDER — SODIUM FERRIC GLUCONATE COMPLEX IN SUCROSE 12.5 MG/ML
125 INJECTION INTRAVENOUS
Status: CANCELLED | OUTPATIENT
Start: 2025-09-03 | End: 2025-09-03

## 2025-09-03 RX ADMIN — SODIUM FERRIC GLUCONATE COMPLEX IN SUCROSE 125 MG: 12.5 INJECTION INTRAVENOUS at 08:09

## 2025-09-03 RX ADMIN — SODIUM CHLORIDE: 9 INJECTION, SOLUTION INTRAVENOUS at 08:09

## 2025-09-03 RX ADMIN — Medication 10 ML: at 09:09

## 2025-09-04 ENCOUNTER — OFFICE VISIT (OUTPATIENT)
Dept: ORTHOPEDICS | Facility: CLINIC | Age: 85
End: 2025-09-04
Payer: MEDICARE

## 2025-09-04 ENCOUNTER — HOSPITAL ENCOUNTER (OUTPATIENT)
Facility: HOSPITAL | Age: 85
Discharge: HOME | End: 2025-09-04
Payer: MEDICARE

## 2025-09-04 DIAGNOSIS — M65.352 TRIGGER FINGER, LEFT LITTLE FINGER: Primary | ICD-10-CM

## 2025-09-04 DIAGNOSIS — R52 PAIN: ICD-10-CM

## 2025-09-04 PROCEDURE — 99999 PR PBB SHADOW E&M-EST. PATIENT-LVL II: CPT | Mod: PBBFAC,,,

## 2025-09-04 PROCEDURE — 73130 X-RAY EXAM OF HAND: CPT | Mod: TC,PN,LT

## 2025-09-04 RX ADMIN — TRIAMCINOLONE ACETONIDE 20 MG: 40 INJECTION, SUSPENSION INTRA-ARTICULAR; INTRAMUSCULAR at 01:09

## 2025-09-05 RX ORDER — TRIAMCINOLONE ACETONIDE 40 MG/ML
20 INJECTION, SUSPENSION INTRA-ARTICULAR; INTRAMUSCULAR
Status: DISCONTINUED | OUTPATIENT
Start: 2025-09-04 | End: 2025-09-05 | Stop reason: HOSPADM

## (undated) DEVICE — GLOVE SURGICAL LATEX SZ 6.5

## (undated) DEVICE — TOWEL OR DISP STRL BLUE 4/PK

## (undated) DEVICE — BAG LINGEMAN DRAIN UROLOGY

## (undated) DEVICE — ELECTRODE RESECTSCP HI 24FR

## (undated) DEVICE — SEE MEDLINE ITEM 157117

## (undated) DEVICE — SEE MEDLINE ITEM 154981

## (undated) DEVICE — GLOVE 7.0 PROTEXIS PI BLUE

## (undated) DEVICE — FIBER LASER HOLMIUM 273 MICRON

## (undated) DEVICE — CONTAINER SPECIMEN STRL 4OZ

## (undated) DEVICE — SOL NACL IRR 3000ML

## (undated) DEVICE — JELLY LUBRICANT STERILE 4 OZ

## (undated) DEVICE — MANIFOLD 4 PORT

## (undated) DEVICE — SEE MEDLINE ITEM 157185

## (undated) DEVICE — GAUZE SPONGE 4X4 12PLY